# Patient Record
Sex: MALE | Race: BLACK OR AFRICAN AMERICAN | NOT HISPANIC OR LATINO | URBAN - METROPOLITAN AREA
[De-identification: names, ages, dates, MRNs, and addresses within clinical notes are randomized per-mention and may not be internally consistent; named-entity substitution may affect disease eponyms.]

---

## 2017-02-24 PROBLEM — Z00.00 ENCOUNTER FOR PREVENTIVE HEALTH EXAMINATION: Status: ACTIVE | Noted: 2017-02-24

## 2021-06-20 ENCOUNTER — INPATIENT (INPATIENT)
Facility: HOSPITAL | Age: 86
LOS: 9 days | Discharge: LEFT BEFORE DISCHARGE | End: 2021-06-30
Attending: HOSPITALIST | Admitting: HOSPITALIST
Payer: MEDICARE

## 2021-06-20 VITALS
HEART RATE: 118 BPM | SYSTOLIC BLOOD PRESSURE: 187 MMHG | DIASTOLIC BLOOD PRESSURE: 114 MMHG | OXYGEN SATURATION: 95 % | RESPIRATION RATE: 28 BRPM

## 2021-06-20 DIAGNOSIS — N17.9 ACUTE KIDNEY FAILURE, UNSPECIFIED: ICD-10-CM

## 2021-06-20 DIAGNOSIS — I51.9 HEART DISEASE, UNSPECIFIED: ICD-10-CM

## 2021-06-20 DIAGNOSIS — E87.2 ACIDOSIS: ICD-10-CM

## 2021-06-20 DIAGNOSIS — E11.10 TYPE 2 DIABETES MELLITUS WITH KETOACIDOSIS WITHOUT COMA: ICD-10-CM

## 2021-06-20 LAB
ALBUMIN SERPL ELPH-MCNC: 3.8 G/DL — SIGNIFICANT CHANGE UP (ref 3.3–5)
ALP SERPL-CCNC: 116 U/L — SIGNIFICANT CHANGE UP (ref 40–120)
ALT FLD-CCNC: 24 U/L — SIGNIFICANT CHANGE UP (ref 4–41)
ANION GAP SERPL CALC-SCNC: 28 MMOL/L — HIGH (ref 7–14)
ANION GAP SERPL CALC-SCNC: 35 MMOL/L — HIGH (ref 7–14)
ANION GAP SERPL CALC-SCNC: 43 MMOL/L — HIGH (ref 7–14)
APPEARANCE UR: CLEAR — SIGNIFICANT CHANGE UP
APTT BLD: 29.7 SEC — SIGNIFICANT CHANGE UP (ref 27–36.3)
AST SERPL-CCNC: 47 U/L — HIGH (ref 4–40)
B-OH-BUTYR SERPL-SCNC: 0.9 MMOL/L — HIGH (ref 0–0.4)
BACTERIA # UR AUTO: ABNORMAL
BASE EXCESS BLDV CALC-SCNC: -11.5 MMOL/L — LOW (ref -3–2)
BASE EXCESS BLDV CALC-SCNC: -16.5 MMOL/L — LOW (ref -3–2)
BASOPHILS # BLD AUTO: 0.06 K/UL — SIGNIFICANT CHANGE UP (ref 0–0.2)
BASOPHILS NFR BLD AUTO: 0.5 % — SIGNIFICANT CHANGE UP (ref 0–2)
BILIRUB SERPL-MCNC: 0.7 MG/DL — SIGNIFICANT CHANGE UP (ref 0.2–1.2)
BILIRUB UR-MCNC: NEGATIVE — SIGNIFICANT CHANGE UP
BLOOD GAS ARTERIAL - LYTES,HGB,ICA,LACT RESULT: SIGNIFICANT CHANGE UP
BLOOD GAS ARTERIAL COMPREHENSIVE RESULT: SIGNIFICANT CHANGE UP
BLOOD GAS VENOUS - CREATININE: 1.6 MG/DL — HIGH (ref 0.5–1.3)
BLOOD GAS VENOUS - CREATININE: 2.3 MG/DL — HIGH (ref 0.5–1.3)
BLOOD GAS VENOUS COMPREHENSIVE RESULT: SIGNIFICANT CHANGE UP
BLOOD GAS VENOUS COMPREHENSIVE RESULT: SIGNIFICANT CHANGE UP
BUN SERPL-MCNC: 15 MG/DL — SIGNIFICANT CHANGE UP (ref 7–23)
BUN SERPL-MCNC: 20 MG/DL — SIGNIFICANT CHANGE UP (ref 7–23)
BUN SERPL-MCNC: 20 MG/DL — SIGNIFICANT CHANGE UP (ref 7–23)
CALCIUM SERPL-MCNC: 6 MG/DL — CRITICAL LOW (ref 8.4–10.5)
CALCIUM SERPL-MCNC: 7.9 MG/DL — LOW (ref 8.4–10.5)
CALCIUM SERPL-MCNC: 9.6 MG/DL — SIGNIFICANT CHANGE UP (ref 8.4–10.5)
CHLORIDE BLDV-SCNC: 103 MMOL/L — SIGNIFICANT CHANGE UP (ref 96–108)
CHLORIDE BLDV-SCNC: 82 MMOL/L — LOW (ref 96–108)
CHLORIDE SERPL-SCNC: 100 MMOL/L — SIGNIFICANT CHANGE UP (ref 98–107)
CHLORIDE SERPL-SCNC: 71 MMOL/L — LOW (ref 98–107)
CHLORIDE SERPL-SCNC: 96 MMOL/L — LOW (ref 98–107)
CK MB BLD-MCNC: 4.4 % — HIGH (ref 0–2.5)
CK MB CFR SERPL CALC: 18.8 NG/ML — HIGH
CK SERPL-CCNC: 426 U/L — HIGH (ref 30–200)
CO2 SERPL-SCNC: 11 MMOL/L — LOW (ref 22–31)
CO2 SERPL-SCNC: 12 MMOL/L — LOW (ref 22–31)
CO2 SERPL-SCNC: 8 MMOL/L — CRITICAL LOW (ref 22–31)
COLOR SPEC: YELLOW — SIGNIFICANT CHANGE UP
CREAT SERPL-MCNC: 1.58 MG/DL — HIGH (ref 0.5–1.3)
CREAT SERPL-MCNC: 2.08 MG/DL — HIGH (ref 0.5–1.3)
CREAT SERPL-MCNC: 2.09 MG/DL — HIGH (ref 0.5–1.3)
CRP SERPL-MCNC: 28.7 MG/L — HIGH
DIFF PNL FLD: ABNORMAL
EOSINOPHIL # BLD AUTO: 0.03 K/UL — SIGNIFICANT CHANGE UP (ref 0–0.5)
EOSINOPHIL NFR BLD AUTO: 0.3 % — SIGNIFICANT CHANGE UP (ref 0–6)
EPI CELLS # UR: 8 /HPF — HIGH (ref 0–5)
GAS PNL BLDV: 122 MMOL/L — LOW (ref 136–146)
GAS PNL BLDV: 141 MMOL/L — SIGNIFICANT CHANGE UP (ref 136–146)
GLUCOSE BLDC GLUCOMTR-MCNC: 408 MG/DL — HIGH (ref 70–99)
GLUCOSE BLDC GLUCOMTR-MCNC: 426 MG/DL — HIGH (ref 70–99)
GLUCOSE BLDC GLUCOMTR-MCNC: 489 MG/DL — CRITICAL HIGH (ref 70–99)
GLUCOSE BLDC GLUCOMTR-MCNC: 522 MG/DL — CRITICAL HIGH (ref 70–99)
GLUCOSE BLDV-MCNC: 647 MG/DL — CRITICAL HIGH (ref 70–99)
GLUCOSE BLDV-MCNC: >1081 MG/DL — CRITICAL HIGH (ref 70–99)
GLUCOSE SERPL-MCNC: 1329 MG/DL — CRITICAL HIGH (ref 70–99)
GLUCOSE SERPL-MCNC: 584 MG/DL — CRITICAL HIGH (ref 70–99)
GLUCOSE SERPL-MCNC: 633 MG/DL — CRITICAL HIGH (ref 70–99)
GLUCOSE UR QL: ABNORMAL
HCO3 BLDV-SCNC: 11 MMOL/L — LOW (ref 20–27)
HCO3 BLDV-SCNC: 14 MMOL/L — LOW (ref 20–27)
HCT VFR BLD CALC: 43.9 % — SIGNIFICANT CHANGE UP (ref 39–50)
HCT VFR BLDA CALC: 23.5 % — LOW (ref 39–51)
HCT VFR BLDA CALC: 28.3 % — LOW (ref 39–51)
HGB BLD CALC-MCNC: 7.5 G/DL — LOW (ref 13–17)
HGB BLD CALC-MCNC: 9.1 G/DL — LOW (ref 13–17)
HGB BLD-MCNC: 13.4 G/DL — SIGNIFICANT CHANGE UP (ref 13–17)
HYALINE CASTS # UR AUTO: 1 /LPF — SIGNIFICANT CHANGE UP (ref 0–7)
IANC: 8.5 K/UL — SIGNIFICANT CHANGE UP (ref 1.5–8.5)
IMM GRANULOCYTES NFR BLD AUTO: 0.8 % — SIGNIFICANT CHANGE UP (ref 0–1.5)
INR BLD: 1.74 RATIO — HIGH (ref 0.88–1.16)
KETONES UR-MCNC: ABNORMAL
LACTATE BLDV-MCNC: 11.7 MMOL/L — CRITICAL HIGH (ref 0.5–2)
LACTATE BLDV-MCNC: 9.3 MMOL/L — CRITICAL HIGH (ref 0.5–2)
LEUKOCYTE ESTERASE UR-ACNC: NEGATIVE — SIGNIFICANT CHANGE UP
LIDOCAIN IGE QN: 41 U/L — SIGNIFICANT CHANGE UP (ref 7–60)
LYMPHOCYTES # BLD AUTO: 2.37 K/UL — SIGNIFICANT CHANGE UP (ref 1–3.3)
LYMPHOCYTES # BLD AUTO: 20.7 % — SIGNIFICANT CHANGE UP (ref 13–44)
MAGNESIUM SERPL-MCNC: 1.6 MG/DL — SIGNIFICANT CHANGE UP (ref 1.6–2.6)
MAGNESIUM SERPL-MCNC: 1.9 MG/DL — SIGNIFICANT CHANGE UP (ref 1.6–2.6)
MAGNESIUM SERPL-MCNC: 2.4 MG/DL — SIGNIFICANT CHANGE UP (ref 1.6–2.6)
MCHC RBC-ENTMCNC: 29.5 PG — SIGNIFICANT CHANGE UP (ref 27–34)
MCHC RBC-ENTMCNC: 30.5 GM/DL — LOW (ref 32–36)
MCV RBC AUTO: 96.7 FL — SIGNIFICANT CHANGE UP (ref 80–100)
MONOCYTES # BLD AUTO: 0.4 K/UL — SIGNIFICANT CHANGE UP (ref 0–0.9)
MONOCYTES NFR BLD AUTO: 3.5 % — SIGNIFICANT CHANGE UP (ref 2–14)
NEUTROPHILS # BLD AUTO: 8.5 K/UL — HIGH (ref 1.8–7.4)
NEUTROPHILS NFR BLD AUTO: 74.2 % — SIGNIFICANT CHANGE UP (ref 43–77)
NITRITE UR-MCNC: NEGATIVE — SIGNIFICANT CHANGE UP
NRBC # BLD: 0 /100 WBCS — SIGNIFICANT CHANGE UP
NRBC # FLD: 0 K/UL — SIGNIFICANT CHANGE UP
NT-PROBNP SERPL-SCNC: HIGH PG/ML
PCO2 BLDV: 56 MMHG — HIGH (ref 41–51)
PCO2 BLDV: 67 MMHG — HIGH (ref 41–51)
PH BLDV: 6.99 — CRITICAL LOW (ref 7.32–7.43)
PH BLDV: 7.04 — CRITICAL LOW (ref 7.32–7.43)
PH UR: 6.5 — SIGNIFICANT CHANGE UP (ref 5–8)
PHOSPHATE SERPL-MCNC: 4.8 MG/DL — HIGH (ref 2.5–4.5)
PHOSPHATE SERPL-MCNC: 4.9 MG/DL — HIGH (ref 2.5–4.5)
PHOSPHATE SERPL-MCNC: 6.8 MG/DL — HIGH (ref 2.5–4.5)
PLATELET # BLD AUTO: 142 K/UL — LOW (ref 150–400)
PO2 BLDV: 54 MMHG — HIGH (ref 35–40)
PO2 BLDV: 55 MMHG — HIGH (ref 35–40)
POTASSIUM BLDV-SCNC: 2.3 MMOL/L — CRITICAL LOW (ref 3.4–4.5)
POTASSIUM BLDV-SCNC: 3.8 MMOL/L — SIGNIFICANT CHANGE UP (ref 3.4–4.5)
POTASSIUM SERPL-MCNC: 2.3 MMOL/L — CRITICAL LOW (ref 3.5–5.3)
POTASSIUM SERPL-MCNC: 2.8 MMOL/L — CRITICAL LOW (ref 3.5–5.3)
POTASSIUM SERPL-MCNC: 3.5 MMOL/L — SIGNIFICANT CHANGE UP (ref 3.5–5.3)
POTASSIUM SERPL-SCNC: 2.3 MMOL/L — CRITICAL LOW (ref 3.5–5.3)
POTASSIUM SERPL-SCNC: 2.8 MMOL/L — CRITICAL LOW (ref 3.5–5.3)
POTASSIUM SERPL-SCNC: 3.5 MMOL/L — SIGNIFICANT CHANGE UP (ref 3.5–5.3)
PROT SERPL-MCNC: 7.3 G/DL — SIGNIFICANT CHANGE UP (ref 6–8.3)
PROT UR-MCNC: ABNORMAL
PROTHROM AB SERPL-ACNC: 19.3 SEC — HIGH (ref 10.6–13.6)
RBC # BLD: 4.54 M/UL — SIGNIFICANT CHANGE UP (ref 4.2–5.8)
RBC # FLD: 13.3 % — SIGNIFICANT CHANGE UP (ref 10.3–14.5)
RBC CASTS # UR COMP ASSIST: 52 /HPF — HIGH (ref 0–4)
SAO2 % BLDV: 68.4 % — SIGNIFICANT CHANGE UP (ref 60–85)
SAO2 % BLDV: 70.2 % — SIGNIFICANT CHANGE UP (ref 60–85)
SARS-COV-2 RNA SPEC QL NAA+PROBE: SIGNIFICANT CHANGE UP
SODIUM SERPL-SCNC: 125 MMOL/L — LOW (ref 135–145)
SODIUM SERPL-SCNC: 139 MMOL/L — SIGNIFICANT CHANGE UP (ref 135–145)
SODIUM SERPL-SCNC: 140 MMOL/L — SIGNIFICANT CHANGE UP (ref 135–145)
SP GR SPEC: 1.03 — HIGH (ref 1.01–1.02)
TROPONIN T, HIGH SENSITIVITY RESULT: 283 NG/L — CRITICAL HIGH
TROPONIN T, HIGH SENSITIVITY RESULT: 740 NG/L — CRITICAL HIGH
UROBILINOGEN FLD QL: SIGNIFICANT CHANGE UP
WBC # BLD: 11.45 K/UL — HIGH (ref 3.8–10.5)
WBC # FLD AUTO: 11.45 K/UL — HIGH (ref 3.8–10.5)
WBC UR QL: 9 /HPF — HIGH (ref 0–5)

## 2021-06-20 PROCEDURE — 71045 X-RAY EXAM CHEST 1 VIEW: CPT | Mod: 26,77

## 2021-06-20 PROCEDURE — 93010 ELECTROCARDIOGRAM REPORT: CPT

## 2021-06-20 PROCEDURE — 71045 X-RAY EXAM CHEST 1 VIEW: CPT | Mod: 26,59

## 2021-06-20 PROCEDURE — 99291 CRITICAL CARE FIRST HOUR: CPT | Mod: 25

## 2021-06-20 RX ORDER — CHLORHEXIDINE GLUCONATE 213 G/1000ML
15 SOLUTION TOPICAL EVERY 12 HOURS
Refills: 0 | Status: DISCONTINUED | OUTPATIENT
Start: 2021-06-20 | End: 2021-06-25

## 2021-06-20 RX ORDER — HEPARIN SODIUM 5000 [USP'U]/ML
INJECTION INTRAVENOUS; SUBCUTANEOUS
Qty: 25000 | Refills: 0 | Status: DISCONTINUED | OUTPATIENT
Start: 2021-06-20 | End: 2021-06-22

## 2021-06-20 RX ORDER — SODIUM CHLORIDE 9 MG/ML
2000 INJECTION INTRAMUSCULAR; INTRAVENOUS; SUBCUTANEOUS ONCE
Refills: 0 | Status: COMPLETED | OUTPATIENT
Start: 2021-06-20 | End: 2021-06-20

## 2021-06-20 RX ORDER — CLOPIDOGREL BISULFATE 75 MG/1
75 TABLET, FILM COATED ORAL DAILY
Refills: 0 | Status: DISCONTINUED | OUTPATIENT
Start: 2021-06-20 | End: 2021-06-30

## 2021-06-20 RX ORDER — VANCOMYCIN HCL 1 G
1000 VIAL (EA) INTRAVENOUS ONCE
Refills: 0 | Status: COMPLETED | OUTPATIENT
Start: 2021-06-20 | End: 2021-06-20

## 2021-06-20 RX ORDER — BUMETANIDE 0.25 MG/ML
2 INJECTION INTRAMUSCULAR; INTRAVENOUS
Qty: 20 | Refills: 0 | Status: DISCONTINUED | OUTPATIENT
Start: 2021-06-20 | End: 2021-06-22

## 2021-06-20 RX ORDER — SODIUM BICARBONATE 1 MEQ/ML
0.27 SYRINGE (ML) INTRAVENOUS
Qty: 150 | Refills: 0 | Status: DISCONTINUED | OUTPATIENT
Start: 2021-06-20 | End: 2021-06-20

## 2021-06-20 RX ORDER — ETOMIDATE 2 MG/ML
20 INJECTION INTRAVENOUS ONCE
Refills: 0 | Status: COMPLETED | OUTPATIENT
Start: 2021-06-20 | End: 2021-06-20

## 2021-06-20 RX ORDER — SODIUM BICARBONATE 1 MEQ/ML
50 SYRINGE (ML) INTRAVENOUS ONCE
Refills: 0 | Status: COMPLETED | OUTPATIENT
Start: 2021-06-20 | End: 2021-06-20

## 2021-06-20 RX ORDER — INSULIN HUMAN 100 [IU]/ML
4 INJECTION, SOLUTION SUBCUTANEOUS
Qty: 100 | Refills: 0 | Status: DISCONTINUED | OUTPATIENT
Start: 2021-06-20 | End: 2021-06-21

## 2021-06-20 RX ORDER — POTASSIUM CHLORIDE 20 MEQ
40 PACKET (EA) ORAL ONCE
Refills: 0 | Status: COMPLETED | OUTPATIENT
Start: 2021-06-20 | End: 2021-06-20

## 2021-06-20 RX ORDER — POTASSIUM CHLORIDE 20 MEQ
10 PACKET (EA) ORAL
Refills: 0 | Status: COMPLETED | OUTPATIENT
Start: 2021-06-20 | End: 2021-06-20

## 2021-06-20 RX ORDER — SODIUM BICARBONATE 1 MEQ/ML
50 SYRINGE (ML) INTRAVENOUS ONCE
Refills: 0 | Status: DISCONTINUED | OUTPATIENT
Start: 2021-06-20 | End: 2021-06-20

## 2021-06-20 RX ORDER — ASPIRIN/CALCIUM CARB/MAGNESIUM 324 MG
81 TABLET ORAL DAILY
Refills: 0 | Status: DISCONTINUED | OUTPATIENT
Start: 2021-06-20 | End: 2021-06-30

## 2021-06-20 RX ORDER — ROCURONIUM BROMIDE 10 MG/ML
60 VIAL (ML) INTRAVENOUS ONCE
Refills: 0 | Status: COMPLETED | OUTPATIENT
Start: 2021-06-20 | End: 2021-06-20

## 2021-06-20 RX ORDER — MAGNESIUM SULFATE 500 MG/ML
2 VIAL (ML) INJECTION ONCE
Refills: 0 | Status: COMPLETED | OUTPATIENT
Start: 2021-06-20 | End: 2021-06-20

## 2021-06-20 RX ORDER — HEPARIN SODIUM 5000 [USP'U]/ML
3500 INJECTION INTRAVENOUS; SUBCUTANEOUS ONCE
Refills: 0 | Status: COMPLETED | OUTPATIENT
Start: 2021-06-20 | End: 2021-06-20

## 2021-06-20 RX ORDER — POTASSIUM CHLORIDE 20 MEQ
10 PACKET (EA) ORAL ONCE
Refills: 0 | Status: COMPLETED | OUTPATIENT
Start: 2021-06-20 | End: 2021-06-20

## 2021-06-20 RX ORDER — PROPOFOL 10 MG/ML
10 INJECTION, EMULSION INTRAVENOUS
Qty: 1000 | Refills: 0 | Status: DISCONTINUED | OUTPATIENT
Start: 2021-06-20 | End: 2021-06-22

## 2021-06-20 RX ORDER — HYDRALAZINE HCL 50 MG
10 TABLET ORAL ONCE
Refills: 0 | Status: COMPLETED | OUTPATIENT
Start: 2021-06-20 | End: 2021-06-20

## 2021-06-20 RX ORDER — BUMETANIDE 0.25 MG/ML
4 INJECTION INTRAMUSCULAR; INTRAVENOUS ONCE
Refills: 0 | Status: COMPLETED | OUTPATIENT
Start: 2021-06-20 | End: 2021-06-20

## 2021-06-20 RX ORDER — PIPERACILLIN AND TAZOBACTAM 4; .5 G/20ML; G/20ML
3.38 INJECTION, POWDER, LYOPHILIZED, FOR SOLUTION INTRAVENOUS ONCE
Refills: 0 | Status: COMPLETED | OUTPATIENT
Start: 2021-06-20 | End: 2021-06-20

## 2021-06-20 RX ADMIN — HEPARIN SODIUM 700 UNIT(S)/HR: 5000 INJECTION INTRAVENOUS; SUBCUTANEOUS at 23:00

## 2021-06-20 RX ADMIN — Medication 250 MILLIGRAM(S): at 18:27

## 2021-06-20 RX ADMIN — BUMETANIDE 132 MILLIGRAM(S): 0.25 INJECTION INTRAMUSCULAR; INTRAVENOUS at 22:25

## 2021-06-20 RX ADMIN — INSULIN HUMAN 6 UNIT(S)/HR: 100 INJECTION, SOLUTION SUBCUTANEOUS at 16:32

## 2021-06-20 RX ADMIN — Medication 100 MILLIEQUIVALENT(S): at 20:07

## 2021-06-20 RX ADMIN — PROPOFOL 3.4 MICROGRAM(S)/KG/MIN: 10 INJECTION, EMULSION INTRAVENOUS at 18:28

## 2021-06-20 RX ADMIN — Medication 50 MILLIEQUIVALENT(S): at 15:55

## 2021-06-20 RX ADMIN — Medication 0.27 MEQ/KG/HR: at 17:45

## 2021-06-20 RX ADMIN — Medication 100 MILLIEQUIVALENT(S): at 21:54

## 2021-06-20 RX ADMIN — ETOMIDATE 20 MILLIGRAM(S): 2 INJECTION INTRAVENOUS at 16:55

## 2021-06-20 RX ADMIN — SODIUM CHLORIDE 2000 MILLILITER(S): 9 INJECTION INTRAMUSCULAR; INTRAVENOUS; SUBCUTANEOUS at 15:20

## 2021-06-20 RX ADMIN — INSULIN HUMAN 7 UNIT(S)/HR: 100 INJECTION, SOLUTION SUBCUTANEOUS at 19:16

## 2021-06-20 RX ADMIN — Medication 100 MILLIEQUIVALENT(S): at 16:27

## 2021-06-20 RX ADMIN — INSULIN HUMAN 4 UNIT(S)/HR: 100 INJECTION, SOLUTION SUBCUTANEOUS at 21:54

## 2021-06-20 RX ADMIN — Medication 50 GRAM(S): at 22:25

## 2021-06-20 RX ADMIN — BUMETANIDE 20 MG/HR: 0.25 INJECTION INTRAMUSCULAR; INTRAVENOUS at 22:25

## 2021-06-20 RX ADMIN — PIPERACILLIN AND TAZOBACTAM 200 GRAM(S): 4; .5 INJECTION, POWDER, LYOPHILIZED, FOR SOLUTION INTRAVENOUS at 17:50

## 2021-06-20 RX ADMIN — Medication 125 MEQ/KG/HR: at 16:10

## 2021-06-20 RX ADMIN — HEPARIN SODIUM 3500 UNIT(S): 5000 INJECTION INTRAVENOUS; SUBCUTANEOUS at 23:16

## 2021-06-20 RX ADMIN — Medication 60 MILLIGRAM(S): at 16:55

## 2021-06-20 RX ADMIN — Medication 10 MILLIGRAM(S): at 15:30

## 2021-06-20 RX ADMIN — Medication 40 MILLIEQUIVALENT(S): at 22:45

## 2021-06-20 NOTE — CONSULT NOTE ADULT - PROBLEM SELECTOR RECOMMENDATION 2
Pt with anion gap metabolic and lactic acidosis in the setting of likely DKA with LINDSEY. On admission, Serum CCO2 low at 8 with lactate 11.7 and pH: 7.04. Pt received IV sodium bicarbonate in  the ED. Repeat ABG with pH improved to 7.27, Serum CO2: 12 and lactate 12.4. Pt being managed for DKA by primary team. Can consider giving IV sodium bicarbonate with acidemia does not improve. Would continue to monitor pH and serum CO2 levels for now.    Will discuss with Nephrology Attending On call Dr. Law  If you have any questions, please feel free to contact me  Kari Tristan  Nephrology Fellow  Pager: [phone] / [facility]: [phone]  (After 5pm or on weekends please page the on-call fellow) Pt with anion gap metabolic and lactic acidosis in the setting of likely DKA with LINDSEY. On admission, Serum CCO2 low at 8 with lactate 11.7 and pH: 7.04. Pt received IV sodium bicarbonate in  the ED. Repeat ABG with pH improved to 7.27, Serum CO2: 12 and lactate 12.4. Pt being managed for DKA by primary team. Can consider giving IV sodium bicarbonate with acidemia does not improve. Would continue to monitor pH and serum CO2 levels for now.    Discussed with Nephrology Attending On call Dr. Law  If you have any questions, please feel free to contact me  Kari Tristan  Nephrology Fellow  Pager: [phone] / [facility]: [phone]  (After 5pm or on weekends please page the on-call fellow)

## 2021-06-20 NOTE — H&P ADULT - NSHPLABSRESULTS_GEN_ALL_CORE
VITAL SIGNS:  ICU Vital Signs Last 24 Hrs  T(C): --  T(F): --  HR: 124 (2021 16:00) (118 - 132)  BP: 184/127 (2021 16:00) (163/112 - 198/140)  BP(mean): --  ABP: --  ABP(mean): --  RR: 35 (2021 16:00) (28 - 36)  SpO2: 100% (2021 16:00) (90% - 100%)      Plateau pressure:   P/F ratio:     CAPILLARY BLOOD GLUCOSE      POCT Blood Glucose.: 511 mg/dL (2021 17:34)    ECG:    MEDICATIONS:  MEDICATIONS  (STANDING):  insulin regular Infusion 6 Unit(s)/Hr (6 mL/Hr) IV Continuous <Continuous>  piperacillin/tazobactam IVPB... 3.375 Gram(s) IV Intermittent once  vancomycin  IVPB 1000 milliGRAM(s) IV Intermittent once    MEDICATIONS  (PRN):      ALLERGIES:  Allergies    dyes,iv dyes (Unknown)  iodinated radiocontrast agents (Hives)    Intolerances        LABS:                        13.4   11.45 )-----------( 142      ( 2021 15:13 )             43.9     06-20    139  |  96<L>  |  20  ----------------------------<  633<HH>  3.5   |  8<LL>  |  2.09<H>    Ca    9.6      2021 15:13  Phos  6.8     06-20  Mg     2.4     06-20    TPro  7.3  /  Alb  3.8  /  TBili  0.7  /  DBili  x   /  AST  47<H>  /  ALT  24  /  AlkPhos  116  06-20      Urinalysis Basic - ( 2021 16:48 )    Color: Yellow / Appearance: Clear / S.032 / pH: x  Gluc: x / Ketone: Trace  / Bili: Negative / Urobili: <2 mg/dL   Blood: x / Protein: >600 mg/dL / Nitrite: Negative   Leuk Esterase: Negative / RBC: 52 /HPF / WBC 9 /HPF   Sq Epi: x / Non Sq Epi: 8 /HPF / Bacteria: Few        RADIOLOGY & ADDITIONAL TESTS: Reviewed. VITAL SIGNS:  ICU Vital Signs Last 24 Hrs  T(C): --  T(F): --  HR: 124 (2021 16:00) (118 - 132)  BP: 184/127 (2021 16:00) (163/112 - 198/140)  BP(mean): --  ABP: --  ABP(mean): --  RR: 35 (2021 16:00) (28 - 36)  SpO2: 100% (2021 16:00) (90% - 100%)      Plateau pressure:   P/F ratio:     CAPILLARY BLOOD GLUCOSE      POCT Blood Glucose.: 511 mg/dL (2021 17:34)    ECG:    MEDICATIONS:  MEDICATIONS  (STANDING):  insulin regular Infusion 6 Unit(s)/Hr (6 mL/Hr) IV Continuous <Continuous>  piperacillin/tazobactam IVPB... 3.375 Gram(s) IV Intermittent once  vancomycin  IVPB 1000 milliGRAM(s) IV Intermittent once    MEDICATIONS  (PRN):      ALLERGIES:  Allergies    dyes,iv dyes (Unknown)  iodinated radiocontrast agents (Hives)    Intolerances        LABS:                        13.4   11.45 )-----------( 142      ( 2021 15:13 )             43.9     06-20    139  |  96<L>  |  20  ----------------------------<  633<HH>  3.5   |  8<LL>  |  2.09<H>    Ca    9.6      2021 15:13  Phos  6.8     06-20  Mg     2.4     06-20    TPro  7.3  /  Alb  3.8  /  TBili  0.7  /  DBili  x   /  AST  47<H>  /  ALT  24  /  AlkPhos  116  06-20      Urinalysis Basic - ( 2021 16:48 )    Color: Yellow / Appearance: Clear / S.032 / pH: x  Gluc: x / Ketone: Trace  / Bili: Negative / Urobili: <2 mg/dL   Blood: x / Protein: >600 mg/dL / Nitrite: Negative   Leuk Esterase: Negative / RBC: 52 /HPF / WBC 9 /HPF   Sq Epi: x / Non Sq Epi: 8 /HPF / Bacteria: Few        RADIOLOGY & ADDITIONAL TESTS: Reviewed.    CXR:   IMPRESSION: Bilateral layering pleural effusions may be due to fluid overload CHF less likely.

## 2021-06-20 NOTE — ED PROVIDER NOTE - ADMIT DISPOSITION PRESENT ON ADMISSION SEPSIS
Patient reported that the abscess of the right breast has worsened. He would like to get the area lanced. He also is requesting an antibiotic until he is able to see the specialist.  Keflex ordered. Yes

## 2021-06-20 NOTE — ED PROVIDER NOTE - PROGRESS NOTE DETAILS
FRANKLIN:  Patient with worsening mental status on bipap.  Not following commands.  Mixed respiratory and metabolic acidosis.  Patient not compensating for likely dka.  Will plan for intubation as patient is not likely to reverse hypercarbia on nippv.  Will discuss intubation with son. NOEMI:  Son contented to intubation.  Right tibial IO placed for more access.  Patient was pre-oxygenated with BIPAP.  Intubated on first attempted by Dr. Louise by glidescope after receiving etomidate and rocuronium, lowest 02 sat 99%.  Bilateral breath sounds auscultated, tube confirmed by et c02 waveform capnography.  CXR pending.  Patient to received insulin, ivf, abx.  MICU aware of patient. Sandra Louise MD PGY-3 Lactate 12.4. Spoke with MICU about possibility of CT abd/pelvis for intraabd ischemia. Would prefer to bring patient to the ICU at this time given instability and patient not currently stable for surgery, CT would not necessarily  at this time Sandra Louise MD PGY-3 patient with worsening glucose and acidosis despite resuscitation, vent settings modified to increase RR to 28, PEEP decreased given sp02 100%., concern for possible inaccurate labs. will re-draw, MICU at bedside. Troponin worsening in the setting of pulm edema/pleural effusions, concern for possible primary cardiac etiology for DKA. MICU aware. NOEMI:  Son contented to intubation.  Right tibial IO placed for more access.  Patient was pre-oxygenated with BIPAP.  Intubated on first attempted by Dr. Louise by glidescope after receiving etomidate and rocuronium, lowest 02 sat 99%.  Bilateral breath sounds auscultated, tube confirmed by et c02 waveform capnography.  CXR pending.  Patient to received insulin, ivf, abx.  Plan to place CVL for additional access. MICU aware of patient. NOEMI:  Patient brought back to trauma bay.  Per EMS, 84 yo M pmh IDDM type I, htn, on augmentin for RLE leg ulcer presents for shortness of breath, lethargy, altered mental status.  FSG "high" in field.  On arrival patient lethargic but following simple commands, deep and fast respirations (Kussmaul-like), protecting airway, crackles at lung bases, RLE ulcer with clean base, cool lower extremities, mm dry.  Hypertensive, tachy (sinus rhythm with PAC on monitor).  EKG non-stemi.  FSG >600.  Suspected DKA with hypertensive heart failure (hypertensive emergency), possible infectious source as well (cellulitis, pna, uti considered).  PIV x2 obtained.  Patient given crystalloid challenge given suspected dka.  Limited IV access at this time so nitro gtt withheld and hydralazine trialed for afterload reduction.  Respiratory called to initiate NIPPV given patient following commands and requiring respiratory support.  Anticipate MICU admission. FRANKLIN:  Patient with worsening mental status on bipap.  Not following commands.  Mixed respiratory and metabolic acidosis, ph 7.0.  Patient not compensating for likely dka.  Bicarb ordered given severe acidemia.  Will plan for intubation as patient is not likely to reverse hypercarbia on nippv.  Will discuss intubation with son.

## 2021-06-20 NOTE — ED PROVIDER NOTE - CLINICAL SUMMARY MEDICAL DECISION MAKING FREE TEXT BOX
86 yo M with PMH type 1 DM, HTN who presents with decreased mental status and increased WOB. Rest of history and physical as noted. Concern for DKA, possibly 2/2 infectious etiology vs primary cardiac etiology vs ?intraabd ischemia given lactate. Will get labs, xray, insulin gtt prn/K supplementation prn,, bicarb prn, trop, UA, respiratory support and ICU

## 2021-06-20 NOTE — H&P ADULT - NSHPPHYSICALEXAM_GEN_ALL_CORE
PHYSICAL EXAM:  Vital Signs Last 24 Hrs  T(C): --  T(F): --  HR: 124 (06-20-21 @ 16:00) (118 - 132)  BP: 184/127 (06-20-21 @ 16:00)  BP(mean): --  RR: 35 (06-20-21 @ 16:00) (28 - 36)  SpO2: 100% (06-20-21 @ 16:00) (90% - 100%)  Wt(kg): --    Constitutional: NAD, awake and alert  EYES: EOMI  ENT:  Normal Hearing, no tonsillar exudates   Neck: Soft and supple , no thyromegaly   Respiratory: Breath sounds are clear bilaterally, No wheezing, rales or rhonchi  Cardiovascular: S1 and S2, regular rate and rhythm, no Murmurs, gallops or rubs, no JVD,    Gastrointestinal: Bowel Sounds present, soft, nontender, nondistended, no guarding, no rebound  Extremities: No cyanosis or clubbing; warm to touch  Vascular: 2+ peripheral pulses lower ex  Neurological: No focal deficits, CN II-XII intact bilaterally, sensation to light touch intact in all extremities, gait intact. Pupils are equally reactive to light and symmetrical in size.   Musculoskeletal: 5/5 strength b/l upper and lower extremities; no joint swelling.  Skin: No rashes  Psych: no depression or anhedonia, AAOx3  HEME: no bruises, no nose bleeds

## 2021-06-20 NOTE — H&P ADULT - ASSESSMENT
85 year old male with hx of diabetes who comes in for AMS, found to be hypoxic and in DKA with additional acute renal failure, intubated and sedated in the ED due to worsening mental status and inability to protect airway.       #Neuro   -currently intubated and sedated        #Pulm   -intubated   -CXR reviewed with small bilateral pleural effusions   -metabolic acidosis with concomitant respiratory acidosis (corrected PCO2 should be 18-22 mmHg)  -Ventilator settings:     #Cardiac:   -hypertensive emergency possibly with acute renal failure   -control BP       #GI;   -NPO b/c intubated  -will need NGT and likely start tube feeds   -monitor stool count     #Renal:   -BUN:Cr < 10   -component of acute renal failure likely 2/2 ATN maybe 2/2 hypertensive emergency   -abbott catheter to monitor I/O, avoid nephrotoxins   -HAGMA 2/2 lactic acidosis and component of DKA   -delta-delta gap 1.44; no concurrent non anion gap acidosis or metabolic alkalosis   -s/p Bicarb push and gtt in ED, will d/c bicarb gtt   -fluid resuscitation for lactic acid clearance   -will need central line access for possible HD if worsening acidosis   -repeat ABGs     #ID:   -leukocytosis to 11, tachycardic and tachypneic, source of infection ?   -BCx pending (6/20), started empirically on vancomycin/zosyn (6/20/21), will consider taking one off due to renal function   -MRSA swab, COVID pending     #Heme/Onc:   -DVT ppx with subq heparin   -monitor leukocytosis   -no thrombocytopenia or anemia  85 year old male with hx of diabetes who comes in for AMS, found to be hypoxic and in DKA with additional acute renal failure, intubated and sedated in the ED due to worsening mental status and inability to protect airway.       #Neuro   -currently intubated and sedated        #Pulm   -intubated   -CXR reviewed with small bilateral pleural effusions   -metabolic acidosis with concomitant respiratory acidosis (corrected PCO2 should be 18-22 mmHg)  -Ventilator settings:     #Cardiac:   -hypertensive emergency possibly with acute renal failure   -control BP       #GI;   -NPO b/c intubated  -will need NGT and likely start tube feeds   -monitor stool count     #Renal:   -BUN:Cr < 10   -component of acute renal failure likely 2/2 ATN maybe 2/2 hypertensive emergency   -abbott catheter to monitor I/O, avoid nephrotoxins   -HAGMA 2/2 lactic acidosis and component of DKA   -delta-delta gap 1.44; no concurrent non anion gap acidosis or metabolic alkalosis   -s/p Bicarb push and gtt in ED, will d/c bicarb gtt   -fluid resuscitation for lactic acid clearance   -will need central line access for possible HD if worsening acidosis   -repeat ABGs   -renal consult    #ID:   -leukocytosis to 11, tachycardic and tachypneic, source of infection ?   -BCx pending (6/20), started empirically on vancomycin/zosyn (6/20/21), will consider taking one off due to renal function   -MRSA swab, COVID pending     #Heme/Onc:   -DVT ppx with subq heparin   -monitor leukocytosis   -no thrombocytopenia or anemia  85 year old male with hx of diabetes who comes in for AMS, found to be hypoxic and in DKA with additional acute renal failure, intubated and sedated in the ED due to worsening mental status and inability to protect airway.       #Neuro   -currently intubated and sedated   - no active mental status with low dose propofol --> concerning, will continue to follow       #Pulm   -intubated   -CXR reviewed with bilateral loculated pleural effusions --> confirmed on POCUS  -metabolic acidosis with concomitant respiratory acidosis (corrected PCO2 should be 18-22 mmHg)  -Ventilator settings: 450/28/5/60    #Cardiac:   - Severely decreased LV function on POCUS with increasing troponins, STD seen in lateral leads --> unclear if this is patient's baseline or not, no EKG to compare. Will c/s cardiology.   -hypertensive emergency possibly with acute renal failure   -control BP       #GI;   -NPO b/c intubated  -will need NGT and likely start tube feeds   -monitor stool count     #Renal:   -BUN:Cr < 10   -component of acute renal failure likely 2/2 ATN maybe 2/2 hypertensive emergency   -abbott catheter to monitor I/O, avoid nephrotoxins   -HAGMA 2/2 lactic acidosis and component of DKA   -delta-delta gap 1.44; no concurrent non anion gap acidosis or metabolic alkalosis   -s/p Bicarb push and gtt in ED, will d/c bicarb gtt   -fluid resuscitation for lactic acid clearance   -will need central line access for possible HD if worsening acidosis   -repeat ABGs   -renal consult    #ID:   -leukocytosis to 11, tachycardic and tachypneic, source of infection ?   -BCx pending (6/20), started empirically on vancomycin/zosyn (6/20/21), will consider taking one off due to renal function   -MRSA swab, COVID pending     #Heme/Onc:   -DVT ppx with subq heparin   -monitor leukocytosis   -no thrombocytopenia or anemia

## 2021-06-20 NOTE — CONSULT NOTE ADULT - PROBLEM SELECTOR RECOMMENDATION 9
Pt with hemodynamically mediated LINDSEY in the setting of infection?, DKA and medication use (Telmisartan). No previous labs for review. On admission, SCr elevated at 2.09 (6/20). UA dirty with proteinuria, hematuria, pyuria. POCUS exam by ICU team showed evidence suggestive of chronic kidney disease.  Send urine electrolytes and spot urine TP/CR. Send for serum and urine immunofixation. Obtain Kidney sonogram. Pt appears hypervolemic on examination. CXR findings reviewed. Recommend give Bumex 4 mg once and start on  Bumex IV infusion at 1mg/hour. Will need to consider CVVHDF if renal failure continues to worsen. Hold Telmisartan. Monitor labs and urine output. Avoid NSAIDs, ACEI/ARBS, RCA and nephrotoxins. Dose medications as per eGFR. Pt with hemodynamically mediated LINDSEY in the setting of infection?, DKA and medication use (Telmisartan). No previous labs for review. On admission, SCr elevated at 2.09 (6/20). UA dirty with proteinuria, hematuria, pyuria. Send urine electrolytes and spot urine TP/CR. Send for serum and urine immunofixation. Obtain Kidney sonogram. Pt appears hypervolemic on examination. CXR findings reviewed. Recommend give Bumex 4 mg once and start on  Bumex IV infusion at 1mg/hour. Will need to consider CVVHDF if renal failure continues to worsen. Hold Telmisartan. Monitor labs and urine output. Avoid NSAIDs, ACEI/ARBS, RCA and nephrotoxins. Dose medications as per eGFR.

## 2021-06-20 NOTE — ED ADULT NURSE REASSESSMENT NOTE - NS ED NURSE REASSESS COMMENT FT1
report received from LOKI Abreu pt intubated admitted to MICU for DKA. mobile ICU LOKI Nicholson at bedside, enroute to MICU with patient.

## 2021-06-20 NOTE — CONSULT NOTE ADULT - PROBLEM SELECTOR RECOMMENDATION 9
POCUS reviewed by cardiology fellow and discussed with MICU attending. The severe LVSD unlikely acute as pt is hemodynamically stable and not on pressor. EKG is without acute ischemia. POCUS reviewed by cardiology fellow and discussed with MICU attending. The severe LVSD unlikely acute as pt is hemodynamically stable and not on pressor. EKG is without acute ischemia. Troponin is elevated in the setting of hypoxia, worsening renal function and ADHF with pbnp >49,000. More likely chronic component to clinical picture. Unlikely to benefit from mechanical support at this time. Will reevaluate as necessary.    Trend cardiac enzymes  risk factor profile to include TSH, HGBA1c, Lipid profile,   EKG daily and PRN chest pain  Diurese, strict I/O, daily weights  Trend BMP 2/2 diuresis and replete as needed, k>4, mg> 2  hold BB at this time 2/2  ADHF.  Echo to evaluate LV/RV, valves and WMA

## 2021-06-20 NOTE — ED PROCEDURE NOTE - CPROC ED INFORMED CONSENT1
verbal consent by son at bedside/This was an emergent procedure and consent was implied.
This was an emergent procedure and consent was implied.

## 2021-06-20 NOTE — ED PROVIDER NOTE - OBJECTIVE STATEMENT
86 yo M with PMH type 1 DM, HTN who presents with decreased mental status and increased WOB. BIBEMS with son at bedside. FS red HIGH in field and in ER. Has been taking augmentin x 3-4 days for concern for b/l LE infection. Patient unstable with urgent need for intervention, patient unable to provide history at this time.     Meds: Telmisartan, isosorbide, folbee, atenolol, colesevelam, augmentin  Unclear diabetic regimen at this time

## 2021-06-20 NOTE — H&P ADULT - ATTENDING COMMENTS
85M with no known PMH presents to the hospital today with 1 week of diarrhea, RLE pain, and confusion by EMS today. Per son, patient began having difficulty walking 2/2 RLE pain which appeared to be ulcerated from son's standpoint. He went to physician who prescribed augmentin. After taking augmentin, patient developed diarrhea multiple times a day for 3-4d which became associated with confusion such that the patient lost sense of time and place. Per son, patient never stopped urinating. Per son, the patient has no cardiac or renal history and see's no physicians for chronic medical problems.      In ED: T: 94.7 BP: 141/74  HR: 130  RR: 35  SpO2: 94% 264SrT4 on Bilevel NIV. Patient provided Vanc/Zosyn, 2L IVF, started on insulin gtt, 10mg hydralazine. Mental status waxing and waning, patient was subsequently intubated for hypercarbic respiratory failure and airway protection. Laboratory investigation revealed WBC: 11.45, mild thrombocytopenia to 142, FS > 600, HAGMA, RA, Metabolic alkalosis, bicarbonate: 8, A, SCr: 2.09 (no known baseline), phos: 6.8, troponin 283-->740, and lactate 11.7-->12.3. Patient started on propofol as well as bumex gtt and brought to MICU for further care.       Neuro:  - reportedly AAOx3 at baseline with subacute decline in the context of diarrhea and poor po intake.  - currently sedated on propofol, reactive pupils, no mental status that can be assessed on low dose, will continue to monitor  - Will consider CTh or EEG in the future    Pulm  - Currently intubated on mechanical ventilation for hypercarbic respiratory failure and airway protection in the context of renal failure and high metabolic demand  - Recent ABG demonstrates pH: 7.27/31/191 on 450/28/10/100%. Will wean vent settings when able  - POCUS demonstrates anterior A-line predominant pattern with lateral B-lines and posterior large heterogeneous pleural effusions bilaterally. ?chronic effusions with chronic diuresis.  - Will check serial ABG's    Cards:   - Not currently in shock state  - POCUS demonstrates severe LV systolic function with diastolic function and elevated LAP (E/E": > 16) with RVSP estimated at 40-45mmHg in the context of metabolic demand and increasingly elevated troponins  - Will check CK, CKMB, and pro-BNP  - EKG demonstrates sinus rhythym with LA enlargement and non-specific T-wave changes. No baseline to compare to  - Unclear if this represents NSTEMI vs type 2 MI. Consulted cardiology. Empirically will start patient on heparin gtt and will start ASA/plavix.  - MAP goal > 65mmHg    Renal  - Likely LINDSEY on CKD (patient takes sevelemer at home) in the context of diarrhea/poor po intake and etiology could be pre-renal vs medical renal disease  - patient reportedly not on Metformin at home  - POCUS demonstrates abnormal cortex echogenicity  - c/w phosphate binders and start patient on bicarb gtt at 150cc/hr for now.  - Patient currently anuric and is fluid overloaded. No electrolyte abnormalities. Consulted renal and will attempt loop diuretic challenge prior to HD to UF and refractory acidosis purposes.   - BMP q4h with mg/phos for now  - Rising lactate likely in the setting of infection vs TypeA/B etiologies    GI  - Will establish OGT access  - Can begin OGT feeds with nepro when appropriate from DKA standpoint  - GI p/px can be provided either with feeds or with PPI    Endocrine  - Currently patient meeds criteria for DKA; however, BHB low (0.9) raising suspicion that acidosis is driven more by renal failure than by DKA; however, will place on insulin gtt empirically for hyperglycemia control  - DKA protocol for now with BMP q4h with mg/phos for now  - will hold TF until AG closed and then will start NPH    Heme  - No hematologic excurions at this time    ID  - Patient currently hypothermic  - Will start empiric abx at this time, renally dose vancomycin, azithromycin, and meropenam send BCx, SCx, UA, Ucx, Ulegionella.   - Will de-escalate as appropriate    Skin  - Has 1 PIV, will place subclavian TLC for enhanced IV access  - Has what appears to be saphenous vein graft harvesting surgery on left leg --> unclear what for  - chronic PAD ulcer on RLE        Patient is Full Code  Patient critically ill, guarded prognosis.

## 2021-06-20 NOTE — ED PROVIDER NOTE - ATTENDING CONTRIBUTION TO CARE
I have personally performed a face to face bedside history and physical examination of this patient. I have discussed the history, examination, review of systems, assessment and plan of management with the resident. I have reviewed the electronic medical record and amended it to reflect my history, review of systems, physical exam, assessment and plan.    Brief HPI:  84 yo M pmh IDDM type I, htn, on augmentin for RLE leg ulcer presents for shortness of breath, lethargy, altered mental status.  FSG "high" in field.  On arrival patient lethargic but following simple commands, deep and fast respirations (Kussmaul-like), protecting airway, crackles at lung bases, RLE ulcer with clean base, cool lower extremities, mm dry.  Hypertensive, tachy (sinus rhythm with PAC on monitor).  EKG non-stemi.  FSG >600.  Suspected DKA with hypertensive heart failure (hypertensive emergency), possible infectious source as well (cellulitis, pna, uti considered).  PIV x2 obtained.  Patient given crystalloid challenge given suspected dka.  Limited IV access at this time so nitro gtt withheld and hydralazine trialed for afterload reduction.  Respiratory called to initiate NIPPV given patient following commands and requiring respiratory support.  Anticipate MICU admission.

## 2021-06-20 NOTE — ED PROCEDURE NOTE - CPROC ED INDICATIONS1
minimal peripheral access/no peripheral access
airway protection/critical patient/mental status change

## 2021-06-20 NOTE — CONSULT NOTE ADULT - SUBJECTIVE AND OBJECTIVE BOX
HISTORY OF PRESENT ILLNESS:  Patient is a 85y old  Male who presents with a chief complaint of AMS, Hypoxia (2021 17:27)    HPI:  85 year old male unknown pmhx coming to hospital brought in by EMS today.  Per ED note and collateral info from son, patient not feeling well since yesterday and was given 0.4 nitro by son. Came to ED and reportedly hypoxic to 60s and lethargic. Mental status waxing and waning, patient was placed on bipap then subsequently intubated for airway protection in the ED. Patient found to be severely acidotic with worsening renal function from baseline, tachycardic and with leukocytosis. Given dose of vanc/zosyn, bcx drawn, 2L IVF, potassium, bicarb, started on insulin gtt.   Cardiology consult called for rising troponin 283 ->740        Allergies  dyes,iv dyes (Unknown)  iodinated radiocontrast agents (Hives)      MEDICATIONS:  aspirin  chewable 81 milliGRAM(s) Oral daily  buMETAnide Infusion 4 mG/Hr IV Continuous <Continuous>  buMETAnide IVPB 4 milliGRAM(s) IV Intermittent once  clopidogrel Tablet 75 milliGRAM(s) Oral daily  heparin   Injectable 3500 Unit(s) IV Push once  heparin  Infusion.  Unit(s)/Hr IV Continuous <Continuous>  propofol Infusion 10 MICROgram(s)/kG/Min IV Continuous <Continuous>  insulin regular Infusion 8 Unit(s)/Hr IV Continuous <Continuous>  chlorhexidine 0.12% Liquid 15 milliLiter(s) Oral Mucosa every 12 hours  magnesium sulfate  IVPB 2 Gram(s) IV Intermittent once  potassium chloride   Powder 40 milliEquivalent(s) Oral once  potassium chloride  10 mEq/100 mL IVPB 10 milliEquivalent(s) IV Intermittent every 1 hour      PAST MEDICAL & SURGICAL HISTORY:  HTN (hypertension)  Diabetes, type I        FAMILY HISTORY:      SOCIAL HISTORY:      Smoker: [ ] Active [ ] never  [ ] previous  Alcohol:  [ ] social [ ] daily [ ] never  Lives with:   Occupation:    REVIEW OF SYSTEMS  Intubated and sedated  >>> <<<    PHYSICAL EXAM:  T(C): 34.8 (21 @ 20:26), Max: 34.8 (21 @ 18:57)  HR: 90 (21 @ 20:26) (82 - 133)  BP: 154/88 (21 @ 20:26) (128/60 - 198/140)  RR: 28 (21 @ 20:26) (18 - 36)  SpO2: 100% (21 @ 20:26) (90% - 100%)  Wt(kg): --    Appearance: Intubated and sedated  Cardiovascular: Normal S1 S2, No JVD, No murmurs, No edema  Respiratory: Lungs clear to auscultation	  Psychiatry: A & O x 3, Mood & affect appropriate  Gastrointestinal:  Soft, Non-tender, + BS	  Skin: No rashes, No ecchymoses, No cyanosis	  Neurologic: Intubated and sedated  Extremities: Normal range of motion, No clubbing, cyanosis or edema  Vascular: Peripheral pulses palpable 2+ bilaterally    I&O's Summary    	 	  LABS:	 	                          13.4   11.45 )-----------( 142      ( 2021 15:13 )             43.9     06-20    140  |  100  |  20  ----------------------------<  584<HH>  2.8<LL>   |  12<L>  |  2.08<H>      125<L>  |  71<L>  |  15  ----------------------------<  1329<HH>  2.3<LL>   |  11<L>  |  1.58<H>    Ca    7.9<L>      2021 19:51  Ca    6.0<LL>      2021 19:00  Phos  4.9     -20  Phos  4.8     06-20  Mg     1.9     06-20  Mg     1.6     -20  TPro  7.3  /  Alb  3.8  /  TBili  0.7  /  DBili  x   /  AST  47<H>  /  ALT  24  /  AlkPhos  116  -20  LIVER FUNCTIONS - ( 2021 15:13 )  Alb: 3.8 g/dL / Pro: 7.3 g/dL / ALK PHOS: 116 U/L / ALT: 24 U/L / AST: 47 U/L / GGT: x         proBNP:   Lipid Profile:   HgA1c:   TSH:   CARDIAC MARKERS:  Blood Gas Venous - Lactate: 9.3 mmol/L ( @ 19:00)  Blood Gas Venous - Lactate: 11.7 mmol/L ( @ 15:13)  ABG - ( 2021 19:51 )  pH, Arterial: 7.27  pH, Blood: x     /  pCO2: 31    /  pO2: 192   / HCO3: 15    / Base Excess: -11.7 /  SaO2: 99.2    CAPILLARY BLOOD GLUCOSE  OCT Blood Glucose.: 522 mg/dL (2021 19:46)  Urinalysis Basic - ( 2021 16:48 )  Color: Yellow / Appearance: Clear / S.032 / pH: x  Gluc: x / Ketone: Trace  / Bili: Negative / Urobili: <2 mg/dL   Blood: x / Protein: >600 mg/dL / Nitrite: Negative   Leuk Esterase: Negative / RBC: 52 /HPF / WBC 9 /HPF   Sq Epi: x / Non Sq Epi: 8 /HPF / Bacteria: Few                     HISTORY OF PRESENT ILLNESS:  Patient is a 85y old  Male who presents with a chief complaint of AMS, Hypoxia (2021 17:27)    HPI:  85 year old male PMH per son CAD s/p stent(s) >10 yrs ago, DM, HTN BIBEMS for "not feeling well x 1 day" as reported by son. In ED patient hypoxic to 60s and lethargic. Mental status waxing and waning, patient was placed on bipap then subsequently intubated for airway protection in the ED. Patient severely acidotic with worsening renal function, hyperglycemic,  tachycardic and with leukocytosis. Given dose of vanc/zosyn, bcx drawn, 2L IVF, potassium, bicarb, started on insulin gtt and transferred to MICU. Cardiology consult called for rising troponin 283 ->740 and severe LVSD on POCUS. EKG NSR, HR 81, without acute ischemia.        Allergies  dyes,iv dyes (Unknown)  iodinated radiocontrast agents (Hives)      MEDICATIONS:  aspirin  chewable 81 milliGRAM(s) Oral daily  buMETAnide Infusion 4 mG/Hr IV Continuous <Continuous>  buMETAnide IVPB 4 milliGRAM(s) IV Intermittent once  clopidogrel Tablet 75 milliGRAM(s) Oral daily  heparin   Injectable 3500 Unit(s) IV Push once  heparin  Infusion.  Unit(s)/Hr IV Continuous <Continuous>  propofol Infusion 10 MICROgram(s)/kG/Min IV Continuous <Continuous>  insulin regular Infusion 8 Unit(s)/Hr IV Continuous <Continuous>  chlorhexidine 0.12% Liquid 15 milliLiter(s) Oral Mucosa every 12 hours  magnesium sulfate  IVPB 2 Gram(s) IV Intermittent once  potassium chloride   Powder 40 milliEquivalent(s) Oral once  potassium chloride  10 mEq/100 mL IVPB 10 milliEquivalent(s) IV Intermittent every 1 hour      PAST MEDICAL & SURGICAL HISTORY:  HTN (hypertension)  Diabetes, type I        FAMILY HISTORY: unknown      SOCIAL HISTORY:  unknown        REVIEW OF SYSTEMS  Intubated and sedated  >>> <<<    PHYSICAL EXAM:  T(C): 34.8 (21 @ 20:26), Max: 34.8 (21 @ 18:57)  HR: 90 (21 @ 20:26) (82 - 133)  BP: 154/88 (21 @ 20:26) (128/60 - 198/140)  RR: 28 (21 @ 20:26) (18 - 36)  SpO2: 100% (21 @ 20:26) (90% - 100%)  Wt(kg): --    Appearance: Intubated and sedated  Cardiovascular: Normal S1 S2, No edema  Respiratory: Lungs clear to auscultation	  Skin: No rashes, No ecchymoses, No cyanosis	  Neurologic: Intubated and sedated  Extremities: Normal range of motion, No clubbing, cyanosis  Vascular: Peripheral pulses palpable 2+ bilaterally    I&O's Summary    	 	  LABS:	 	                          13.4   11.45 )-----------( 142      ( 2021 15:13 )             43.9     20    140  |  100  |  20  ----------------------------<  584<HH>  2.8<LL>   |  12<L>  |  2.08<H>      125<L>  |  71<L>  |  15  ----------------------------<  1329<HH>  2.3<LL>   |  11<L>  |  1.58<H>    Ca    7.9<L>      2021 19:51  Ca    6.0<LL>      2021 19:00  Phos  4.9     -20  Phos  4.8     -20  Mg     1.9     -20  Mg     1.6     -20  TPro  7.3  /  Alb  3.8  /  TBili  0.7  /  DBili  x   /  AST  47<H>  /  ALT  24  /  AlkPhos  116  -20  LIVER FUNCTIONS - ( 2021 15:13 )  Alb: 3.8 g/dL / Pro: 7.3 g/dL / ALK PHOS: 116 U/L / ALT: 24 U/L / AST: 47 U/L / GGT: x         proBNP:   Lipid Profile:   HgA1c:   TSH:   CARDIAC MARKERS:  Blood Gas Venous - Lactate: 9.3 mmol/L ( @ 19:00)  Blood Gas Venous - Lactate: 11.7 mmol/L ( @ 15:13)  ABG - ( 2021 19:51 )  pH, Arterial: 7.27  pH, Blood: x     /  pCO2: 31    /  pO2: 192   / HCO3: 15    / Base Excess: -11.7 /  SaO2: 99.2    CAPILLARY BLOOD GLUCOSE  OCT Blood Glucose.: 522 mg/dL (2021 19:46)  Urinalysis Basic - ( 2021 16:48 )  Color: Yellow / Appearance: Clear / S.032 / pH: x  Gluc: x / Ketone: Trace  / Bili: Negative / Urobili: <2 mg/dL   Blood: x / Protein: >600 mg/dL / Nitrite: Negative   Leuk Esterase: Negative / RBC: 52 /HPF / WBC 9 /HPF   Sq Epi: x / Non Sq Epi: 8 /HPF / Bacteria: Few

## 2021-06-20 NOTE — CONSULT NOTE ADULT - ASSESSMENT
85 year old male PMH per son CAD s/p stent(s) >10 yrs ago, DM, HTN BIBEMS for "not feeling well x 1 day" as reported by son. In ED patient hypoxic to 60s and lethargic. Mental status waxing and waning, patient was placed on bipap then subsequently intubated for airway protection in the ED. Patient severely acidotic with worsening renal function, hyperglycemic,  tachycardic and with leukocytosis. Given dose of vanc/zosyn, bcx drawn, 2L IVF, potassium, bicarb, started on insulin gtt and transferred to MICU. Cardiology consult called for rising troponin 283 ->740 and severe LVSD on POCUS. EKG NSR, HR 81, without acute ischemia. 85 year old male PMH per son CAD s/p stent(s) >10 yrs ago, DM, HTN BIBEMS for "not feeling well x 1 day" as reported by son. In ED patient hypoxic to 60s and lethargic. Mental status waxing and waning, patient was placed on bipap then subsequently intubated for airway protection in the ED. Patient severely acidotic with worsening renal function, hyperglycemic,  tachycardic and with leukocytosis. Given dose of vanc/zosyn, bcx drawn, 2L IVF, potassium, bicarb, started on insulin gtt and transferred to MICU. Cardiology consult called for rising troponin 283 ->740 and severe LVSD on POCUS. EKG NSR, HR 81, without acute ischemia.

## 2021-06-20 NOTE — ED ADULT TRIAGE NOTE - CHIEF COMPLAINT QUOTE
Pt BIBA from home as AMS, was found to be hypoxic in 60s on room air, BGL read HI by EMS. Pt placed on 15L NRB. Taken to TRB

## 2021-06-20 NOTE — CONSULT NOTE ADULT - SUBJECTIVE AND OBJECTIVE BOX
[facility] Division of Kidney Diseases & Hypertension  INITIAL CONSULT NOTE  [phone]  --------------------------------------------------------------------------------  HPI:      PAST HISTORY  --------------------------------------------------------------------------------  PAST MEDICAL & SURGICAL HISTORY:  HTN (hypertension)    Diabetes, type I      FAMILY HISTORY:    PAST SOCIAL HISTORY:  ALLERGIES & MEDICATIONS  --------------------------------------------------------------------------------  Allergies    dyes,iv dyes (Unknown)  iodinated radiocontrast agents (Hives)    Intolerances      Standing Inpatient Medications  aspirin  chewable 81 milliGRAM(s) Oral daily  buMETAnide Infusion 4 mG/Hr IV Continuous <Continuous>  buMETAnide IVPB 4 milliGRAM(s) IV Intermittent once  chlorhexidine 0.12% Liquid 15 milliLiter(s) Oral Mucosa every 12 hours  clopidogrel Tablet 75 milliGRAM(s) Oral daily  heparin   Injectable 3500 Unit(s) IV Push once  heparin  Infusion.  Unit(s)/Hr IV Continuous <Continuous>  insulin regular Infusion 4 Unit(s)/Hr IV Continuous <Continuous>  magnesium sulfate  IVPB 2 Gram(s) IV Intermittent once  potassium chloride   Powder 40 milliEquivalent(s) Oral once  potassium chloride  10 mEq/100 mL IVPB 10 milliEquivalent(s) IV Intermittent every 1 hour  propofol Infusion 10 MICROgram(s)/kG/Min IV Continuous <Continuous>    PRN Inpatient Medications    REVIEW OF SYSTEMS  --------------------------------------------------------------------------------  Gen: No  fevers/chills, weakness  Skin: No rashes  Head/Eyes/Ears/Mouth: No headache; Normal hearing; Normal vision w/o blurriness;   Respiratory: No dyspnea, cough, wheezing, hemoptysis  CV: No chest pain,   GI: No abdominal pain, diarrhea, constipation, nausea, vomiting  : No increased frequency, dysuria, hematuria, nocturia  MSK: No joint pain/swelling;   Neuro: No dizziness/lightheadedness, weakness, seizures    All other systems were reviewed and are negative, except as noted.    VITALS/PHYSICAL EXAM  --------------------------------------------------------------------------------  T(C): 34.8 (06-20-21 @ 20:26), Max: 34.8 (06-20-21 @ 18:57)  HR: 82 (06-20-21 @ 21:00) (82 - 133)  BP: 158/87 (06-20-21 @ 21:00) (128/60 - 198/140)  RR: 28 (06-20-21 @ 21:00) (18 - 36)  SpO2: 100% (06-20-21 @ 21:00) (90% - 100%)  Wt(kg): --    Weight (kg): 56.7 (06-20-21 @ 16:02)      Physical Exam:  	Gen: NAD, well-appearing  	HEENT: PERRL, supple neck  	Pulm: CTA B/L  	CV:  S1S2  	Abd: +BS, soft   	Ext: No B/L Lower ext edema  	Neuro: No focal deficits  	Skin: Warm, without rashes  	Vascular access:     LABS/STUDIES  --------------------------------------------------------------------------------              13.4   11.45 >-----------<  142      [06-20-21 @ 15:13]              43.9     140  |  100  |  20  ----------------------------<  584      [06-20-21 @ 19:51]  2.8   |  12  |  2.08        Ca     7.9     [06-20-21 @ 19:51]      Mg     1.9     [06-20-21 @ 19:51]      Phos  4.9     [06-20-21 @ 19:51]    TPro  7.3  /  Alb  3.8  /  TBili  0.7  /  DBili  x   /  AST  47  /  ALT  24  /  AlkPhos  116  [06-20-21 @ 15:13]          Creatinine Trend:  SCr 2.08 [06-20 @ 19:51]  SCr 1.58 [06-20 @ 19:00]  SCr 2.09 [06-20 @ 15:13]    Urinalysis - [06-20-21 @ 16:48]      Color Yellow / Appearance Clear / SG 1.032 / pH 6.5      Gluc >= 1000 mg/dL / Ketone Trace  / Bili Negative / Urobili <2 mg/dL       Blood Moderate / Protein >600 mg/dL / Leuk Est Negative / Nitrite Negative      RBC 52 / WBC 9 / Hyaline 1 / Gran  / Sq Epi  / Non Sq Epi 8 / Bacteria Few           [facility] Division of Kidney Diseases & Hypertension  INITIAL CONSULT NOTE  [phone]  --------------------------------------------------------------------------------  HPI: 85 year old male with PMHx HTN, DM  was brought in by EMS for lethargic and respiratory failure. Per ED note and collateral info from son, patient not feeling well since yesterday and was given 0.4 nitro by son. Pt presented to the ED and was reportedly hypoxic to 60s and lethargic. Mental status waxing and waning, patient was placed on BIPAP then subsequently intubated for airway protection.    On admission, labs notable for metabolic acidosis, elevated lactate (pH: 7.02, serum CO2 low at 8 with lactate 11.7) and hyperglycemia. Pt received Vancomycin/Zosyn, 2L IVF, 1 amp sodium bicarbonate and started on Insulin drip. Nephrology team consulted for LINDSEY and acidosis. No previous labs for review on [facility]. On admission, pt with elevated SCr of 2.09 (6/10). Pt seen and examined in ICU. Pt intubated with FIO2: 50% PEEP+8) and sedated. BP stable without any IV vasopressor support. Pt on insulin drip for possible DKA. CXR with bilateral pleural effusions. Review of meds showed pt on Atenolol, Telmisartan, Isosorbide and Colesevelam.     PAST HISTORY  --------------------------------------------------------------------------------  PAST MEDICAL & SURGICAL HISTORY:  HTN (hypertension)  Diabetes, type I    FAMILY HISTORY:    PAST SOCIAL HISTORY:  ALLERGIES & MEDICATIONS  --------------------------------------------------------------------------------  Allergies    dyes,iv dyes (Unknown)  iodinated radiocontrast agents (Hives)    Intolerances      Standing Inpatient Medications  aspirin  chewable 81 milliGRAM(s) Oral daily  buMETAnide Infusion 4 mG/Hr IV Continuous <Continuous>  buMETAnide IVPB 4 milliGRAM(s) IV Intermittent once  chlorhexidine 0.12% Liquid 15 milliLiter(s) Oral Mucosa every 12 hours  clopidogrel Tablet 75 milliGRAM(s) Oral daily  heparin   Injectable 3500 Unit(s) IV Push once  heparin  Infusion.  Unit(s)/Hr IV Continuous <Continuous>  insulin regular Infusion 4 Unit(s)/Hr IV Continuous <Continuous>  magnesium sulfate  IVPB 2 Gram(s) IV Intermittent once  potassium chloride   Powder 40 milliEquivalent(s) Oral once  potassium chloride  10 mEq/100 mL IVPB 10 milliEquivalent(s) IV Intermittent every 1 hour  propofol Infusion 10 MICROgram(s)/kG/Min IV Continuous <Continuous>    PRN Inpatient Medications    REVIEW OF SYSTEMS  --------------------------------------------------------------------------------  Unable to obtain due to clinical condition    VITALS/PHYSICAL EXAM  --------------------------------------------------------------------------------  T(C): 34.8 (06-20-21 @ 20:26), Max: 34.8 (06-20-21 @ 18:57)  HR: 82 (06-20-21 @ 21:00) (82 - 133)  BP: 158/87 (06-20-21 @ 21:00) (128/60 - 198/140)  RR: 28 (06-20-21 @ 21:00) (18 - 36)  SpO2: 100% (06-20-21 @ 21:00) (90% - 100%)  Wt(kg): --    Weight (kg): 56.7 (06-20-21 @ 16:02)    Physical Exam:  	Gen: Intubated and sedated  	HEENT: +ETT  	Pulm: mechanical breath sounds, fair air entry, decreased  	CV:  S1S2  	Abd: +BS, soft   	Ext: +bilateral LE edema, IO line on right leg  	Neuro: sedated  	Skin: Warm, without rashes  	Vascular access:     LABS/STUDIES  --------------------------------------------------------------------------------              13.4   11.45 >-----------<  142      [06-20-21 @ 15:13]              43.9     140  |  100  |  20  ----------------------------<  584      [06-20-21 @ 19:51]  2.8   |  12  |  2.08        Ca     7.9     [06-20-21 @ 19:51]      Mg     1.9     [06-20-21 @ 19:51]      Phos  4.9     [06-20-21 @ 19:51]    TPro  7.3  /  Alb  3.8  /  TBili  0.7  /  DBili  x   /  AST  47  /  ALT  24  /  AlkPhos  116  [06-20-21 @ 15:13]    Creatinine Trend:  SCr 2.08 [06-20 @ 19:51]  SCr 1.58 [06-20 @ 19:00]  SCr 2.09 [06-20 @ 15:13]    Urinalysis - [06-20-21 @ 16:48]      Color Yellow / Appearance Clear / SG 1.032 / pH 6.5      Gluc >= 1000 mg/dL / Ketone Trace  / Bili Negative / Urobili <2 mg/dL       Blood Moderate / Protein >600 mg/dL / Leuk Est Negative / Nitrite Negative      RBC 52 / WBC 9 / Hyaline 1 / Gran  / Sq Epi  / Non Sq Epi 8 / Bacteria Few

## 2021-06-20 NOTE — ED ADULT NURSE REASSESSMENT NOTE - NS ED NURSE REASSESS COMMENT FT1
RN Facilitator: Pt appears lethargic, not following commands, currently on bipap. Dr. Bloch & Dr. Llamas called to bedside. Pt intubated, ET tube size 7.5 used, 23 at the lip line.

## 2021-06-20 NOTE — CHART NOTE - NSCHARTNOTEFT_GEN_A_CORE
: Elfego Angulo    INDICATION: Respiratory Failure; renal failure; lactic acidosis    PROCEDURE:  [x] LIMITED ECHO  [x] LIMITED CHEST  [x] LIMITED RETROPERITONEAL      FINDINGS:  Lungs: Anterior A-line predominance. Lateral B-line predominance. Posterior bilateral heterogeneous large pleural effusions.  Heart: Severe LV systolic dysfunction. LVOT VTI: 8.4cm. Diastolic dysfunction with increased LAP. E: 34, A: 56  E/A: 0.61  E': 2 E/E': 16.67 Normal RA/RV with preserved systolic function. RVSP estimated at 46mmHg assuming RAP of 15mmHg.   Renal: Increased cortical echogenicity when compared to liver. Normal size. No hydro.      INTERPRETATION:  Bilateral heterogenous pleural effusions  Severe segmental LV systolic dysfunction (akenesis of anterior wall) with diastolic dysfunction with increased LAP  Moderate Pulmonary Hypertension  Medical Renal Disease    Images stored on [employer].

## 2021-06-20 NOTE — ED PROVIDER NOTE - SHIFT CHANGE DETAILS
NOEMI:  Patient signed out to Dr. Bloch pending cmp.  VBG with mixed metabolic and respiratory acidosis.  Worsening on bipap at time of signout, decision made to intubation.  Decision made to start insulin gtt with cmp pending given critical illness and reassuring k on vbg, potassium to be supplemented with insulin gtt.  Patient critically ill, intubated, however bp reassuring at time of signout. NOEMI:  Patient signed out to Dr. Bloch pending MICU evaluation, post-intubation cxr, cmp still not resulted at time of sign out.  VBG with mixed metabolic and respiratory acidosis.  Worsening on bipap at time of signout, decision made for intubation which was performed.  Decision made to start insulin gtt with cmp pending given critical illness and reassuring k on vbg, potassium to be supplemented with insulin gtt.  Patient critically ill, intubated, however bp improved on propofol at time of signout.

## 2021-06-20 NOTE — ED ADULT NURSE NOTE - OBJECTIVE STATEMENT
Pt arrived to [facility], tachypneic, RR 34 skin cool diaphoretic and clammy, son states he has not been feeling well since yesterday, gave him 0.4 of nitro PTA as he thought it would make him feel better. Pt arousalble to verbal commands, but very lethargic. MD at bedside, 16G PIV placed per ED resident Sandra, 20G PIV placed. labs draw and sent, EKG preformed, Pt changed, gown provided placed on cardiac monitor, ST noted. Noted BLE wounds oozing and weeping. IVF started per order. FS HI. Bipap initiated

## 2021-06-20 NOTE — ED PROCEDURE NOTE - PROCEDURE DATE TIME, MLM
20-Jun-2021 17:55
20-Jun-2021 17:39
[Follow-Up Evaluation] : a follow-up evaluation for
[Developmental Delay] : developmental delay
[Mother] : mother
[Other: _____] : [unfilled]
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

## 2021-06-20 NOTE — H&P ADULT - HISTORY OF PRESENT ILLNESS
85 year old male unknown pmhx coming to hospital brought in by EMS today.  Per ED note and collateral info from son, patient not feeling well since yesterday and was given 0.4 nitro by son. Came to ED and reportedly hypoxic to 60s and lethargic. Mental status waxing and waning, patient was placed on bipap then subsequently intubated for airway protection in the ED. Patient found to be severely acidotic with worsening renal function from baseline as as tachycardic and leukocytosis. GIven  85 year old male unknown pmhx coming to hospital brought in by EMS today.  Per ED note and collateral info from son, patient not feeling well since yesterday and was given 0.4 nitro by son. Came to ED and reportedly hypoxic to 60s and lethargic. Mental status waxing and waning, patient was placed on bipap then subsequently intubated for airway protection in the ED. Patient found to be severely acidotic with worsening renal function from baseline as as tachycardic and leukocytosis. Given dose of vanc/zosyn, bcx drawn, 2L IVF, potassium, bicarb, started on insulin gtt.

## 2021-06-21 LAB
ALBUMIN SERPL ELPH-MCNC: 2.9 G/DL — LOW (ref 3.3–5)
ALBUMIN SERPL ELPH-MCNC: 2.9 G/DL — LOW (ref 3.3–5)
ALBUMIN SERPL ELPH-MCNC: 3.1 G/DL — LOW (ref 3.3–5)
ALP SERPL-CCNC: 89 U/L — SIGNIFICANT CHANGE UP (ref 40–120)
ALP SERPL-CCNC: 90 U/L — SIGNIFICANT CHANGE UP (ref 40–120)
ALP SERPL-CCNC: 99 U/L — SIGNIFICANT CHANGE UP (ref 40–120)
ALT FLD-CCNC: 1511 U/L — HIGH (ref 4–41)
ALT FLD-CCNC: 1560 U/L — HIGH (ref 4–41)
ALT FLD-CCNC: 1675 U/L — HIGH (ref 4–41)
ANION GAP SERPL CALC-SCNC: 16 MMOL/L — HIGH (ref 7–14)
ANION GAP SERPL CALC-SCNC: 16 MMOL/L — HIGH (ref 7–14)
ANION GAP SERPL CALC-SCNC: 18 MMOL/L — HIGH (ref 7–14)
ANION GAP SERPL CALC-SCNC: 19 MMOL/L — HIGH (ref 7–14)
ANION GAP SERPL CALC-SCNC: 24 MMOL/L — HIGH (ref 7–14)
APTT BLD: 77.1 SEC — HIGH (ref 27–36.3)
APTT BLD: 77.6 SEC — HIGH (ref 27–36.3)
APTT BLD: 96.2 SEC — HIGH (ref 27–36.3)
AST SERPL-CCNC: 2312 U/L — HIGH (ref 4–40)
AST SERPL-CCNC: 3385 U/L — HIGH (ref 4–40)
AST SERPL-CCNC: 3758 U/L — HIGH (ref 4–40)
BILIRUB SERPL-MCNC: 0.8 MG/DL — SIGNIFICANT CHANGE UP (ref 0.2–1.2)
BILIRUB SERPL-MCNC: 0.9 MG/DL — SIGNIFICANT CHANGE UP (ref 0.2–1.2)
BILIRUB SERPL-MCNC: 1 MG/DL — SIGNIFICANT CHANGE UP (ref 0.2–1.2)
BLOOD GAS ARTERIAL - LYTES,HGB,ICA,LACT RESULT: SIGNIFICANT CHANGE UP
BLOOD GAS ARTERIAL COMPREHENSIVE RESULT: SIGNIFICANT CHANGE UP
BLOOD GAS VENOUS COMPREHENSIVE RESULT: SIGNIFICANT CHANGE UP
BUN SERPL-MCNC: 21 MG/DL — SIGNIFICANT CHANGE UP (ref 7–23)
BUN SERPL-MCNC: 23 MG/DL — SIGNIFICANT CHANGE UP (ref 7–23)
BUN SERPL-MCNC: 24 MG/DL — HIGH (ref 7–23)
BUN SERPL-MCNC: 25 MG/DL — HIGH (ref 7–23)
BUN SERPL-MCNC: 26 MG/DL — HIGH (ref 7–23)
CALCIUM SERPL-MCNC: 8.5 MG/DL — SIGNIFICANT CHANGE UP (ref 8.4–10.5)
CALCIUM SERPL-MCNC: 8.7 MG/DL — SIGNIFICANT CHANGE UP (ref 8.4–10.5)
CALCIUM SERPL-MCNC: 8.9 MG/DL — SIGNIFICANT CHANGE UP (ref 8.4–10.5)
CALCIUM SERPL-MCNC: 9 MG/DL — SIGNIFICANT CHANGE UP (ref 8.4–10.5)
CALCIUM SERPL-MCNC: 9.1 MG/DL — SIGNIFICANT CHANGE UP (ref 8.4–10.5)
CHLORIDE SERPL-SCNC: 102 MMOL/L — SIGNIFICANT CHANGE UP (ref 98–107)
CHLORIDE SERPL-SCNC: 104 MMOL/L — SIGNIFICANT CHANGE UP (ref 98–107)
CHLORIDE SERPL-SCNC: 107 MMOL/L — SIGNIFICANT CHANGE UP (ref 98–107)
CK MB BLD-MCNC: 5.6 % — HIGH (ref 0–2.5)
CK MB CFR SERPL CALC: 80.3 NG/ML — HIGH
CK MB CFR SERPL CALC: 97.4 NG/ML — HIGH
CK SERPL-CCNC: 1437 U/L — HIGH (ref 30–200)
CO2 SERPL-SCNC: 11 MMOL/L — LOW (ref 22–31)
CO2 SERPL-SCNC: 18 MMOL/L — LOW (ref 22–31)
CO2 SERPL-SCNC: 19 MMOL/L — LOW (ref 22–31)
CO2 SERPL-SCNC: 19 MMOL/L — LOW (ref 22–31)
CO2 SERPL-SCNC: 22 MMOL/L — SIGNIFICANT CHANGE UP (ref 22–31)
CREAT ?TM UR-MCNC: 7 MG/DL — SIGNIFICANT CHANGE UP
CREAT SERPL-MCNC: 2.08 MG/DL — HIGH (ref 0.5–1.3)
CREAT SERPL-MCNC: 2.34 MG/DL — HIGH (ref 0.5–1.3)
CREAT SERPL-MCNC: 2.34 MG/DL — HIGH (ref 0.5–1.3)
CREAT SERPL-MCNC: 2.49 MG/DL — HIGH (ref 0.5–1.3)
CREAT SERPL-MCNC: 2.64 MG/DL — HIGH (ref 0.5–1.3)
CREAT SERPL-MCNC: 2.73 MG/DL — HIGH (ref 0.5–1.3)
GLUCOSE BLDC GLUCOMTR-MCNC: 100 MG/DL — HIGH (ref 70–99)
GLUCOSE BLDC GLUCOMTR-MCNC: 115 MG/DL — HIGH (ref 70–99)
GLUCOSE BLDC GLUCOMTR-MCNC: 122 MG/DL — HIGH (ref 70–99)
GLUCOSE BLDC GLUCOMTR-MCNC: 127 MG/DL — HIGH (ref 70–99)
GLUCOSE BLDC GLUCOMTR-MCNC: 132 MG/DL — HIGH (ref 70–99)
GLUCOSE BLDC GLUCOMTR-MCNC: 140 MG/DL — HIGH (ref 70–99)
GLUCOSE BLDC GLUCOMTR-MCNC: 146 MG/DL — HIGH (ref 70–99)
GLUCOSE BLDC GLUCOMTR-MCNC: 152 MG/DL — HIGH (ref 70–99)
GLUCOSE BLDC GLUCOMTR-MCNC: 156 MG/DL — HIGH (ref 70–99)
GLUCOSE BLDC GLUCOMTR-MCNC: 158 MG/DL — HIGH (ref 70–99)
GLUCOSE BLDC GLUCOMTR-MCNC: 182 MG/DL — HIGH (ref 70–99)
GLUCOSE BLDC GLUCOMTR-MCNC: 193 MG/DL — HIGH (ref 70–99)
GLUCOSE BLDC GLUCOMTR-MCNC: 202 MG/DL — HIGH (ref 70–99)
GLUCOSE BLDC GLUCOMTR-MCNC: 216 MG/DL — HIGH (ref 70–99)
GLUCOSE BLDC GLUCOMTR-MCNC: 236 MG/DL — HIGH (ref 70–99)
GLUCOSE BLDC GLUCOMTR-MCNC: 279 MG/DL — HIGH (ref 70–99)
GLUCOSE BLDC GLUCOMTR-MCNC: 286 MG/DL — HIGH (ref 70–99)
GLUCOSE BLDC GLUCOMTR-MCNC: 291 MG/DL — HIGH (ref 70–99)
GLUCOSE BLDC GLUCOMTR-MCNC: 313 MG/DL — HIGH (ref 70–99)
GLUCOSE BLDC GLUCOMTR-MCNC: 346 MG/DL — HIGH (ref 70–99)
GLUCOSE BLDC GLUCOMTR-MCNC: 387 MG/DL — HIGH (ref 70–99)
GLUCOSE BLDC GLUCOMTR-MCNC: 408 MG/DL — HIGH (ref 70–99)
GLUCOSE BLDC GLUCOMTR-MCNC: 482 MG/DL — CRITICAL HIGH (ref 70–99)
GLUCOSE BLDC GLUCOMTR-MCNC: 72 MG/DL — SIGNIFICANT CHANGE UP (ref 70–99)
GLUCOSE BLDC GLUCOMTR-MCNC: 79 MG/DL — SIGNIFICANT CHANGE UP (ref 70–99)
GLUCOSE SERPL-MCNC: 185 MG/DL — HIGH (ref 70–99)
GLUCOSE SERPL-MCNC: 241 MG/DL — HIGH (ref 70–99)
GLUCOSE SERPL-MCNC: 416 MG/DL — HIGH (ref 70–99)
GLUCOSE SERPL-MCNC: 77 MG/DL — SIGNIFICANT CHANGE UP (ref 70–99)
GLUCOSE SERPL-MCNC: 94 MG/DL — SIGNIFICANT CHANGE UP (ref 70–99)
HCT VFR BLD CALC: 36.2 % — LOW (ref 39–50)
HGB BLD-MCNC: 11.9 G/DL — LOW (ref 13–17)
INR BLD: 1.65 RATIO — HIGH (ref 0.88–1.16)
INR BLD: 1.91 RATIO — HIGH (ref 0.88–1.16)
LEGIONELLA AG UR QL: NEGATIVE — SIGNIFICANT CHANGE UP
MAGNESIUM SERPL-MCNC: 2 MG/DL — SIGNIFICANT CHANGE UP (ref 1.6–2.6)
MAGNESIUM SERPL-MCNC: 2.3 MG/DL — SIGNIFICANT CHANGE UP (ref 1.6–2.6)
MAGNESIUM SERPL-MCNC: 2.4 MG/DL — SIGNIFICANT CHANGE UP (ref 1.6–2.6)
MAGNESIUM SERPL-MCNC: 3.1 MG/DL — HIGH (ref 1.6–2.6)
MCHC RBC-ENTMCNC: 29.6 PG — SIGNIFICANT CHANGE UP (ref 27–34)
MCHC RBC-ENTMCNC: 32.9 GM/DL — SIGNIFICANT CHANGE UP (ref 32–36)
MCV RBC AUTO: 90 FL — SIGNIFICANT CHANGE UP (ref 80–100)
NRBC # BLD: 0 /100 WBCS — SIGNIFICANT CHANGE UP
NRBC # FLD: 0.03 K/UL — HIGH
PHOSPHATE SERPL-MCNC: 0.5 MG/DL — CRITICAL LOW (ref 2.5–4.5)
PHOSPHATE SERPL-MCNC: 1.6 MG/DL — LOW (ref 2.5–4.5)
PHOSPHATE SERPL-MCNC: 3.3 MG/DL — SIGNIFICANT CHANGE UP (ref 2.5–4.5)
PHOSPHATE SERPL-MCNC: 3.4 MG/DL — SIGNIFICANT CHANGE UP (ref 2.5–4.5)
PLATELET # BLD AUTO: 125 K/UL — LOW (ref 150–400)
POTASSIUM SERPL-MCNC: 3.5 MMOL/L — SIGNIFICANT CHANGE UP (ref 3.5–5.3)
POTASSIUM SERPL-MCNC: 3.9 MMOL/L — SIGNIFICANT CHANGE UP (ref 3.5–5.3)
POTASSIUM SERPL-MCNC: 4.4 MMOL/L — SIGNIFICANT CHANGE UP (ref 3.5–5.3)
POTASSIUM SERPL-SCNC: 3.5 MMOL/L — SIGNIFICANT CHANGE UP (ref 3.5–5.3)
POTASSIUM SERPL-SCNC: 3.9 MMOL/L — SIGNIFICANT CHANGE UP (ref 3.5–5.3)
POTASSIUM SERPL-SCNC: 4.4 MMOL/L — SIGNIFICANT CHANGE UP (ref 3.5–5.3)
PROT ?TM UR-MCNC: 32 MG/DL — SIGNIFICANT CHANGE UP
PROT SERPL-MCNC: 5.7 G/DL — LOW (ref 6–8.3)
PROT SERPL-MCNC: 5.7 G/DL — LOW (ref 6–8.3)
PROT SERPL-MCNC: 5.8 G/DL — LOW (ref 6–8.3)
PROT/CREAT UR-RTO: 4.6 RATIO — HIGH (ref 0–0.2)
PROTHROM AB SERPL-ACNC: 18.5 SEC — HIGH (ref 10.6–13.6)
PROTHROM AB SERPL-ACNC: 21.1 SEC — HIGH (ref 10.6–13.6)
RBC # BLD: 4.02 M/UL — LOW (ref 4.2–5.8)
RBC # FLD: 13.2 % — SIGNIFICANT CHANGE UP (ref 10.3–14.5)
SODIUM SERPL-SCNC: 139 MMOL/L — SIGNIFICANT CHANGE UP (ref 135–145)
SODIUM SERPL-SCNC: 140 MMOL/L — SIGNIFICANT CHANGE UP (ref 135–145)
SODIUM SERPL-SCNC: 140 MMOL/L — SIGNIFICANT CHANGE UP (ref 135–145)
SODIUM SERPL-SCNC: 142 MMOL/L — SIGNIFICANT CHANGE UP (ref 135–145)
SODIUM SERPL-SCNC: 142 MMOL/L — SIGNIFICANT CHANGE UP (ref 135–145)
TROPONIN T, HIGH SENSITIVITY RESULT: 9554 NG/L — CRITICAL HIGH
TROPONIN T, HIGH SENSITIVITY RESULT: SIGNIFICANT CHANGE UP NG/L
VANCOMYCIN FLD-MCNC: 13.7 UG/ML — SIGNIFICANT CHANGE UP
WBC # BLD: 14.58 K/UL — HIGH (ref 3.8–10.5)
WBC # FLD AUTO: 14.58 K/UL — HIGH (ref 3.8–10.5)

## 2021-06-21 PROCEDURE — 93306 TTE W/DOPPLER COMPLETE: CPT | Mod: 26

## 2021-06-21 PROCEDURE — 36556 INSERT NON-TUNNEL CV CATH: CPT

## 2021-06-21 PROCEDURE — 76770 US EXAM ABDO BACK WALL COMP: CPT | Mod: 26

## 2021-06-21 PROCEDURE — 99223 1ST HOSP IP/OBS HIGH 75: CPT

## 2021-06-21 PROCEDURE — 43752 NASAL/OROGASTRIC W/TUBE PLMT: CPT | Mod: 59

## 2021-06-21 PROCEDURE — 99291 CRITICAL CARE FIRST HOUR: CPT

## 2021-06-21 RX ORDER — PIPERACILLIN AND TAZOBACTAM 4; .5 G/20ML; G/20ML
3.38 INJECTION, POWDER, LYOPHILIZED, FOR SOLUTION INTRAVENOUS EVERY 8 HOURS
Refills: 0 | Status: DISCONTINUED | OUTPATIENT
Start: 2021-06-21 | End: 2021-06-21

## 2021-06-21 RX ORDER — INSULIN HUMAN 100 [IU]/ML
1 INJECTION, SOLUTION SUBCUTANEOUS
Qty: 100 | Refills: 0 | Status: DISCONTINUED | OUTPATIENT
Start: 2021-06-21 | End: 2021-06-22

## 2021-06-21 RX ORDER — SODIUM CHLORIDE 9 MG/ML
1000 INJECTION, SOLUTION INTRAVENOUS
Refills: 0 | Status: DISCONTINUED | OUTPATIENT
Start: 2021-06-21 | End: 2021-06-21

## 2021-06-21 RX ORDER — CHLORHEXIDINE GLUCONATE 213 G/1000ML
1 SOLUTION TOPICAL DAILY
Refills: 0 | Status: DISCONTINUED | OUTPATIENT
Start: 2021-06-21 | End: 2021-06-30

## 2021-06-21 RX ORDER — VANCOMYCIN HCL 1 G
1000 VIAL (EA) INTRAVENOUS EVERY 12 HOURS
Refills: 0 | Status: DISCONTINUED | OUTPATIENT
Start: 2021-06-21 | End: 2021-06-21

## 2021-06-21 RX ORDER — HYDRALAZINE HCL 50 MG
5 TABLET ORAL EVERY 6 HOURS
Refills: 0 | Status: DISCONTINUED | OUTPATIENT
Start: 2021-06-21 | End: 2021-06-25

## 2021-06-21 RX ORDER — POTASSIUM PHOSPHATE, MONOBASIC POTASSIUM PHOSPHATE, DIBASIC 236; 224 MG/ML; MG/ML
30 INJECTION, SOLUTION INTRAVENOUS ONCE
Refills: 0 | Status: COMPLETED | OUTPATIENT
Start: 2021-06-21 | End: 2021-06-21

## 2021-06-21 RX ORDER — MEROPENEM 1 G/30ML
500 INJECTION INTRAVENOUS EVERY 12 HOURS
Refills: 0 | Status: DISCONTINUED | OUTPATIENT
Start: 2021-06-21 | End: 2021-06-22

## 2021-06-21 RX ORDER — HYDRALAZINE HCL 50 MG
2.5 TABLET ORAL ONCE
Refills: 0 | Status: COMPLETED | OUTPATIENT
Start: 2021-06-21 | End: 2021-06-21

## 2021-06-21 RX ORDER — DEXTROSE MONOHYDRATE, SODIUM CHLORIDE, AND POTASSIUM CHLORIDE 50; .745; 4.5 G/1000ML; G/1000ML; G/1000ML
1000 INJECTION, SOLUTION INTRAVENOUS
Refills: 0 | Status: DISCONTINUED | OUTPATIENT
Start: 2021-06-21 | End: 2021-06-22

## 2021-06-21 RX ADMIN — Medication 110 MILLIGRAM(S): at 17:39

## 2021-06-21 RX ADMIN — Medication 5 MILLIGRAM(S): at 20:03

## 2021-06-21 RX ADMIN — HEPARIN SODIUM 600 UNIT(S)/HR: 5000 INJECTION INTRAVENOUS; SUBCUTANEOUS at 22:00

## 2021-06-21 RX ADMIN — CHLORHEXIDINE GLUCONATE 1 APPLICATION(S): 213 SOLUTION TOPICAL at 16:54

## 2021-06-21 RX ADMIN — Medication 2.5 MILLIGRAM(S): at 13:13

## 2021-06-21 RX ADMIN — HEPARIN SODIUM 650 UNIT(S)/HR: 5000 INJECTION INTRAVENOUS; SUBCUTANEOUS at 14:24

## 2021-06-21 RX ADMIN — POTASSIUM PHOSPHATE, MONOBASIC POTASSIUM PHOSPHATE, DIBASIC 83.33 MILLIMOLE(S): 236; 224 INJECTION, SOLUTION INTRAVENOUS at 06:45

## 2021-06-21 RX ADMIN — CHLORHEXIDINE GLUCONATE 15 MILLILITER(S): 213 SOLUTION TOPICAL at 17:54

## 2021-06-21 RX ADMIN — CLOPIDOGREL BISULFATE 75 MILLIGRAM(S): 75 TABLET, FILM COATED ORAL at 12:59

## 2021-06-21 RX ADMIN — INSULIN HUMAN 4 UNIT(S)/HR: 100 INJECTION, SOLUTION SUBCUTANEOUS at 13:00

## 2021-06-21 RX ADMIN — BUMETANIDE 20 MG/HR: 0.25 INJECTION INTRAMUSCULAR; INTRAVENOUS at 20:02

## 2021-06-21 RX ADMIN — Medication 110 MILLIGRAM(S): at 05:31

## 2021-06-21 RX ADMIN — BUMETANIDE 20 MG/HR: 0.25 INJECTION INTRAMUSCULAR; INTRAVENOUS at 13:00

## 2021-06-21 RX ADMIN — DEXTROSE MONOHYDRATE, SODIUM CHLORIDE, AND POTASSIUM CHLORIDE 100 MILLILITER(S): 50; .745; 4.5 INJECTION, SOLUTION INTRAVENOUS at 14:22

## 2021-06-21 RX ADMIN — CHLORHEXIDINE GLUCONATE 15 MILLILITER(S): 213 SOLUTION TOPICAL at 05:32

## 2021-06-21 RX ADMIN — Medication 1 APPLICATION(S): at 17:50

## 2021-06-21 RX ADMIN — PROPOFOL 3.4 MICROGRAM(S)/KG/MIN: 10 INJECTION, EMULSION INTRAVENOUS at 13:01

## 2021-06-21 RX ADMIN — MEROPENEM 100 MILLIGRAM(S): 1 INJECTION INTRAVENOUS at 05:31

## 2021-06-21 RX ADMIN — MEROPENEM 100 MILLIGRAM(S): 1 INJECTION INTRAVENOUS at 17:39

## 2021-06-21 RX ADMIN — Medication 81 MILLIGRAM(S): at 12:59

## 2021-06-21 NOTE — PROGRESS NOTE ADULT - ASSESSMENT
Pt with LINDSEY and metabolic acidosis    LINDSEY- Pt with hemodynamically mediated LINDSEY in the setting of infection?, DKA and medication use (Telmisartan). No previous labs for review. On admission, SCr elevated at 2.09 (6/20). UA dirty with proteinuria, hematuria, pyuria. POCUS exam by ICU team showed evidence suggestive of chronic kidney disease.  Send urine electrolytes and spot urine TP/CR. Send for serum and urine immunofixation. Obtain Kidney sonogram. Pt appears hypervolemic on examination. CXR findings reviewed. Continue Bumex IV infusion. Monitor for contraction alkalosis. Will need to consider CVVHDF if renal failure continues to worsen. Hold Telmisartan. Monitor labs and urine output. Avoid NSAIDs, ACEI/ARBS, RCA and nephrotoxins. Dose medications as per eGFR.        Metabolic acidosis with concomitant respiratory acidosis-   Pt initially with anion gap metabolic and lactic acidosis in the setting of likely DKA with LINDSEY. On admission, Serum CO2 low at 8 with lactate 11.7 and pH: 7.04. Pt received IV sodium bicarbonate in  the ED. Repeat ABG with pH improved to 7.27, Serum CO2: 12 and lactate 12.4. Pt being managed for DKA by primary team on insulin gtt. s/p IV sodium bicarbonate infusion. Now with Respiratory alkalosis likely from over ventilation & metabolic alkalosis from diuresis. Lactic acid improved to 5.3; worsening LFTs. Would continue to monitor pH and serum CO2 levels for now.

## 2021-06-21 NOTE — PROGRESS NOTE ADULT - SUBJECTIVE AND OBJECTIVE BOX
[facility] Division of Kidney Diseases & Hypertension  FOLLOW UP NOTE  [phone]--------------------------------------------------------------------------------  Chief Complaint:Type 2 diabetes mellitus with ketoacidosis without coma    HPI: 85 year old male with PMHx HTN, DM  was brought in by EMS for lethargic and respiratory failure. Per ED note and collateral info from son, patient not feeling well since yesterday and was given 0.4 nitro by son. Pt presented to the ED and was reportedly hypoxic to 60s and lethargic. Mental status waxing and waning, patient was placed on BIPAP then subsequently intubated for airway protection.On admission, labs notable for metabolic acidosis, elevated lactate (pH: 7.02, serum CO2 low at 8 with lactate 11.7) and hyperglycemia. Pt received Vancomycin/Zosyn, 2L IVF, 1 amp sodium bicarbonate and started on Insulin drip. Nephrology team consulted for LINDSEY and acidosis. No previous labs for review on [facility]. On admission, pt with elevated SCr of 2.09 (6/10). Pt seen and examined in ICU. Pt intubated with FIO2: 50% PEEP+8) and sedated. BP stable without any IV vasopressor support.  CXR with bilateral pleural effusions. Review of meds showed pt on Atenolol, Telmisartan, Isosorbide and Colesevelam. Pt on insulin drip for possible DKA & Iv bumex for volume overload      Pt seen & examined. Remains intubated & sedated. FiO2 30% with PEEP 5.           PAST HISTORY  --------------------------------------------------------------------------------  No significant changes to PMH, PSH, FHx, SHx, unless otherwise noted    ALLERGIES & MEDICATIONS  --------------------------------------------------------------------------------  Allergies    dyes,iv dyes (Unknown)  iodinated radiocontrast agents (Hives)    Intolerances      Standing Inpatient Medications  aspirin  chewable 81 milliGRAM(s) Oral daily  buMETAnide Infusion 4 mG/Hr IV Continuous <Continuous>  chlorhexidine 0.12% Liquid 15 milliLiter(s) Oral Mucosa every 12 hours  clopidogrel Tablet 75 milliGRAM(s) Oral daily  dextrose 5% + lactated ringers. 1000 milliLiter(s) IV Continuous <Continuous>  doxycycline IVPB 100 milliGRAM(s) IV Intermittent every 12 hours  heparin  Infusion.  Unit(s)/Hr IV Continuous <Continuous>  insulin regular Infusion 4 Unit(s)/Hr IV Continuous <Continuous>  meropenem  IVPB 500 milliGRAM(s) IV Intermittent every 12 hours  propofol Infusion 10 MICROgram(s)/kG/Min IV Continuous <Continuous>    PRN Inpatient Medications      REVIEW OF SYSTEMS  --------------------------------------------------------------------------------  Unobtainable to obtain    VITALS/PHYSICAL EXAM  --------------------------------------------------------------------------------  T(C): 37.8 (06-21-21 @ 08:00), Max: 37.8 (06-21-21 @ 08:00)  HR: 81 (06-21-21 @ 09:20) (71 - 133)  BP: 138/86 (06-21-21 @ 07:30) (99/70 - 198/140)  RR: 18 (06-21-21 @ 09:20) (18 - 36)  SpO2: 99% (06-21-21 @ 09:20) (90% - 100%)  Wt(kg): --    Weight (kg): 56.7 (06-20-21 @ 16:02)      06-20-21 @ 07:01  -  06-21-21 @ 07:00  --------------------------------------------------------  IN: 343.1 mL / OUT: 500 mL / NET: -156.9 mL    06-21-21 @ 07:01  -  06-21-21 @ 10:15  --------------------------------------------------------  IN: 75 mL / OUT: 185 mL / NET: -110 mL      Physical Exam:  	Gen: Intubated and sedated  	HEENT: +vent  	Pulm: mechanical breath sounds, fair air entry, decreased  	CV:  S1S2  	Abd: +BS, soft   	Ext: +bilateral LE edema, IO line on right leg  	Neuro: sedated  	Skin: Warm, without rashes  	Vascular access:     LABS/STUDIES  --------------------------------------------------------------------------------              11.9   14.58 >-----------<  125      [06-21-21 @ 05:29]              36.2     x   |  x   |  x   ----------------------------<  x       [06-21-21 @ 05:30]  x    |  x   |  2.34        Ca     9.1     [06-21-21 @ 05:29]      Mg     2.4     [06-21-21 @ 05:29]      Phos  0.5     [06-21-21 @ 05:29]    TPro  5.7  /  Alb  2.9  /  TBili  1.0  /  DBili  x   /  AST  3758  /  ALT  1675  /  AlkPhos  89  [06-21-21 @ 05:29]    PT/INR: PT 19.3 , INR 1.74       [06-20-21 @ 22:21]  PTT: 96.2       [06-21-21 @ 05:29]    CK 1437      [06-20-21 @ 23:38]    Creatinine Trend:  SCr 2.34 [06-21 @ 05:30]  SCr 2.34 [06-21 @ 05:29]  SCr 2.08 [06-20 @ 23:38]  SCr 2.08 [06-20 @ 19:51]  SCr 1.58 [06-20 @ 19:00]    Urinalysis - [06-20-21 @ 16:48]      Color Yellow / Appearance Clear / SG 1.032 / pH 6.5      Gluc >= 1000 mg/dL / Ketone Trace  / Bili Negative / Urobili <2 mg/dL       Blood Moderate / Protein >600 mg/dL / Leuk Est Negative / Nitrite Negative      RBC 52 / WBC 9 / Hyaline 1 / Gran  / Sq Epi  / Non Sq Epi 8 / Bacteria Few

## 2021-06-21 NOTE — PROCEDURE NOTE - NSPOSTPRCRAD_GEN_A_CORE
feeding tube in correct gastric position/post-procedure radiography performed
central line located in the superior vena cava/no pneumothorax/post-procedure radiography performed

## 2021-06-21 NOTE — CHART NOTE - NSCHARTNOTEFT_GEN_A_CORE
: Elfego Angulo    INDICATION: Decreased MAP; increasing troponemia/CK/CKMB with MALAIKA on EKG    PROCEDURE:  [x] LIMITED ECHO    FINDINGS:  Heart:   Severely decreased LV function  LVOT VTI: 7.3cm  LVOT diameter: 2.1cm  PV AT: 68ms  Diastolic dysfunction  E: 32  A: 39  E/A: 0.81  E': 2  E/E': 20.56  IVC: 1.8cm  systolic reversal of flow in IVC    CO: 1.9L/min  SV: 25cc  DO2: 345cc/min (6.46cc/kg/min)    INTERPRETATION:  Severely Decreased LV systolic function  Diastolic dysfunction with interval increased in LAP  Systolic reversal of flow in IVC  Severely impaired DO2    Images stored on [employer]. : Elfego Angulo    INDICATION: Decreased MAP; increasing troponinemia/CK/CKMB with MALAIKA on EKG    PROCEDURE:  [x] LIMITED ECHO    FINDINGS:  Heart:   Severely decreased LV function  LVOT VTI: 7.3cm  LVOT diameter: 2.1cm  PV AT: 68ms  Diastolic dysfunction  E: 32  A: 39  E/A: 0.81  E': 2  E/E': 20.56  IVC: 1.8cm  systolic reversal of flow in IVC    CO: 1.9L/min  SV: 25cc  DO2: 345cc/min (6.46cc/kg/min)    INTERPRETATION:  Severely Decreased LV systolic function  Diastolic dysfunction with interval increased in LAP  Systolic reversal of flow in IVC  Severely impaired DO2    Images stored on Q-path.    Attending Note: Agree with above

## 2021-06-21 NOTE — PROGRESS NOTE ADULT - SUBJECTIVE AND OBJECTIVE BOX
Kai Vidales, [facility]  Internal Medicine  [facility] [address]  [phone]    Patient is a 85y old  Male who presents with a chief complaint of AMS, Hypoxia (2021 21:45)      SUBJECTIVE / OVERNIGHT EVENTS:    MEDICATIONS  (STANDING):  aspirin  chewable 81 milliGRAM(s) Oral daily  buMETAnide Infusion 4 mG/Hr (20 mL/Hr) IV Continuous <Continuous>  chlorhexidine 0.12% Liquid 15 milliLiter(s) Oral Mucosa every 12 hours  clopidogrel Tablet 75 milliGRAM(s) Oral daily  doxycycline IVPB 100 milliGRAM(s) IV Intermittent every 12 hours  heparin  Infusion.  Unit(s)/Hr (7 mL/Hr) IV Continuous <Continuous>  insulin regular Infusion 4 Unit(s)/Hr (4 mL/Hr) IV Continuous <Continuous>  meropenem  IVPB 500 milliGRAM(s) IV Intermittent every 12 hours  propofol Infusion 10 MICROgram(s)/kG/Min (3.4 mL/Hr) IV Continuous <Continuous>  vancomycin  IVPB 1000 milliGRAM(s) IV Intermittent every 12 hours    MEDICATIONS  (PRN):      PHYSICAL EXAM:  Vital Signs Last 24 Hrs  T(C): 36 (2021 04:00), Max: 36 (2021 00:00)  T(F): 96.8 (2021 04:00), Max: 96.8 (2021 00:00)  HR: 84 (2021 07:40) (71 - 133)  BP: 138/86 (2021 07:30) (99/70 - 198/140)  BP(mean): 97 (2021 07:30) (77 - 128)  RR: 18 (2021 07:40) (18 - 36)  SpO2: 98% (2021 07:40) (90% - 100%)    CONSTITUTIONAL: NAD, well-developed  EYES: conjunctiva and sclera clear  ENMT: Moist oral mucosa  NECK: Supple, no palpable masses  RESPIRATORY: Normal WOB; lungs are CTA b/l  CARDIOVASCULAR: RRR; S1/S2 present; no m/r/g; No LE edema; Peripheral pulses are 2+ bilaterally  ABDOMEN: Soft, nontender, normoactive BS, no rebound/guarding; No hepatosplenomegaly  MUSCULOSKELETAL:  moving all extremities  NEUROLOGY: awake, A&O to person, place, and time  SKIN: No rashes  ----  I&O's Summary    2021 07:01  -  2021 07:00  --------------------------------------------------------  IN: 343.1 mL / OUT: 500 mL / NET: -156.9 mL    2021 07:01  -  2021 07:56  --------------------------------------------------------  IN: 39.5 mL / OUT: 60 mL / NET: -20.5 mL      ----  LABS:                        11.9   14.58 )-----------( 125      ( 2021 05:29 )             36.2     ----      x   |  x   |  x   ----------------------------<  x   x    |  x   |  2.34<H>    Ca    9.1      2021 05:29  Phos  0.5     -  Mg     2.4     -    TPro  5.7<L>  /  Alb  2.9<L>  /  TBili  1.0  /  DBili  x   /  AST  3758<H>  /  ALT  1675<H>  /  AlkPhos  89  06-21    ----  PT/INR - ( 2021 22:21 )   PT: 19.3 sec;   INR: 1.74 ratio         PTT - ( 2021 05:29 )  PTT:96.2 sec  ----  CARDIAC MARKERS ( 2021 05:30 )  x     / x     / x     / x     / 97.4 ng/mL  CARDIAC MARKERS ( 2021 23:38 )  x     / x     / 1437 U/L / x     / 80.3 ng/mL  CARDIAC MARKERS ( 2021 19:52 )  x     / x     / 426 U/L / x     / 18.8 ng/mL      ----  Urinalysis Basic - ( 2021 16:48 )    Color: Yellow / Appearance: Clear / S.032 / pH: x  Gluc: x / Ketone: Trace  / Bili: Negative / Urobili: <2 mg/dL   Blood: x / Protein: >600 mg/dL / Nitrite: Negative   Leuk Esterase: Negative / RBC: 52 /HPF / WBC 9 /HPF   Sq Epi: x / Non Sq Epi: 8 /HPF / Bacteria: Few      ----      RADIOLOGY & ADDITIONAL TESTS:  Results Reviewed:   Imaging Personally Reviewed:  Electrocardiogram Personally Reviewed: Kai Vidales, [facility]  Internal Medicine  [facility] [address]  [phone]    Patient is a 85y old  Male who presents with a chief complaint of AMS, Hypoxia (2021 21:45)      SUBJECTIVE / OVERNIGHT EVENTS:  - overnight, hypothermic 94.6 to 96.8 rectally; MAP 80+ off pressors  - started on bumex gtt at 2300 w/improvement in UOP from 0-25cc/h to 50-60cc/h    MEDICATIONS  (STANDING):  aspirin  chewable 81 milliGRAM(s) Oral daily  buMETAnide Infusion 4 mG/Hr (20 mL/Hr) IV Continuous <Continuous>  chlorhexidine 0.12% Liquid 15 milliLiter(s) Oral Mucosa every 12 hours  clopidogrel Tablet 75 milliGRAM(s) Oral daily  doxycycline IVPB 100 milliGRAM(s) IV Intermittent every 12 hours  heparin  Infusion.  Unit(s)/Hr (7 mL/Hr) IV Continuous <Continuous>  insulin regular Infusion 4 Unit(s)/Hr (4 mL/Hr) IV Continuous <Continuous>  meropenem  IVPB 500 milliGRAM(s) IV Intermittent every 12 hours  propofol Infusion 10 MICROgram(s)/kG/Min (3.4 mL/Hr) IV Continuous <Continuous>  vancomycin  IVPB 1000 milliGRAM(s) IV Intermittent every 12 hours    MEDICATIONS  (PRN):      PHYSICAL EXAM:  Vital Signs Last 24 Hrs  T(C): 36 (2021 04:00), Max: 36 (2021 00:00)  T(F): 96.8 (2021 04:00), Max: 96.8 (2021 00:00)  HR: 84 (2021 07:40) (71 - 133)  BP: 138/86 (2021 07:30) (99/70 - 198/140)  BP(mean): 97 (2021 07:30) (77 - 128)  RR: 18 (2021 07:40) (18 - 36)  SpO2: 98% (2021 07:40) (90% - 100%)    Constitutional: NAD, awake and alert  EYES: EOMI  Neck: Soft and supple , no thyromegaly   Respiratory: Breath sounds are clear bilaterally, No wheezing, rales or rhonchi  Cardiovascular: S1 and S2, regular rate and rhythm, no Murmurs, gallops or rubs, no JVD,    Gastrointestinal: Bowel Sounds present, soft, nontender, nondistended, no guarding, no rebound  Extremities: No cyanosis or clubbing; warm to touch  Vascular: 2+ peripheral pulses lower ex  Neurological: sedated; Pupils are equally reactive to light and symmetrical in size  Skin: No rashes    ----  I&O's Summary    2021 07:01  -  2021 07:00  --------------------------------------------------------  IN: 343.1 mL / OUT: 500 mL / NET: -156.9 mL    2021 07:01  -  2021 07:56  --------------------------------------------------------  IN: 39.5 mL / OUT: 60 mL / NET: -20.5 mL      ----  LABS:                        11.9   14.58 )-----------( 125      ( 2021 05:29 )             36.2     ----      x   |  x   |  x   ----------------------------<  x   x    |  x   |  2.34<H>    Ca    9.1      2021 05:29  Phos  0.5       Mg     2.4         TPro  5.7<L>  /  Alb  2.9<L>  /  TBili  1.0  /  DBili  x   /  AST  3758<H>  /  ALT  1675<H>  /  AlkPhos  89  0621    ----  PT/INR - ( 2021 22:21 )   PT: 19.3 sec;   INR: 1.74 ratio         PTT - ( 2021 05:29 )  PTT:96.2 sec  ----  CARDIAC MARKERS ( 2021 05:30 )  x     / x     / x     / x     / 97.4 ng/mL  CARDIAC MARKERS ( 2021 23:38 )  x     / x     / 1437 U/L / x     / 80.3 ng/mL  CARDIAC MARKERS ( 2021 19:52 )  x     / x     / 426 U/L / x     / 18.8 ng/mL      ----  Urinalysis Basic - ( 2021 16:48 )    Color: Yellow / Appearance: Clear / S.032 / pH: x  Gluc: x / Ketone: Trace  / Bili: Negative / Urobili: <2 mg/dL   Blood: x / Protein: >600 mg/dL / Nitrite: Negative   Leuk Esterase: Negative / RBC: 52 /HPF / WBC 9 /HPF   Sq Epi: x / Non Sq Epi: 8 /HPF / Bacteria: Few      ----

## 2021-06-21 NOTE — CHART NOTE - NSCHARTNOTEFT_GEN_A_CORE
Notified of EKG changes Now with MALAIKA V1, V2, and troponin increase 740 -> 9554. CKMB index 5.6. Case discussed with Cardiology Interventionalist. No urgent cath. Pt remains hemodynamically stable and off pressors. Pt intubated and sedated.  Plan  Continue asa, plavix and heparin per ACS protocol  Continue to trend cardiac enzymes  serial EKG  Cardiology #[id number]

## 2021-06-21 NOTE — PROGRESS NOTE ADULT - ASSESSMENT
85 year old male with hx of diabetes who comes in for AMS, found to be hypoxic and in DKA with additional acute renal failure, intubated and sedated in the ED due to worsening mental status and inability to protect airway.       #Neuro   -currently intubated and sedated   - no active mental status with low dose propofol --> concerning, will continue to follow       #Pulm   -intubated   -CXR reviewed with bilateral loculated pleural effusions --> confirmed on POCUS  -metabolic acidosis with concomitant respiratory acidosis (corrected PCO2 should be 18-22 mmHg)  -Ventilator settings: 450/28/5/60    #Cardiac:   - Severely decreased LV function on POCUS with increasing troponins, STD seen in lateral leads --> unclear if this is patient's baseline or not, no EKG to compare. Will c/s cardiology.   -hypertensive emergency possibly with acute renal failure   -control BP       #GI;   -NPO b/c intubated  -will need NGT and likely start tube feeds   -monitor stool count     #Renal:   -BUN:Cr < 10   -component of acute renal failure likely 2/2 ATN maybe 2/2 hypertensive emergency   -abbott catheter to monitor I/O, avoid nephrotoxins   -HAGMA 2/2 lactic acidosis and component of DKA   -delta-delta gap 1.44; no concurrent non anion gap acidosis or metabolic alkalosis   -s/p Bicarb push and gtt in ED, will d/c bicarb gtt   -fluid resuscitation for lactic acid clearance   -will need central line access for possible HD if worsening acidosis   -repeat ABGs   -renal consult    #ID:   -leukocytosis to 11, tachycardic and tachypneic, source of infection ?   -BCx pending (6/20), started empirically on vancomycin/zosyn (6/20/21), will consider taking one off due to renal function   -MRSA swab, COVID pending     #Heme/Onc:   -DVT ppx with subq heparin   -monitor leukocytosis   -no thrombocytopenia or anemia  85 year old male with hx of diabetes who comes in for AMS, found to be hypoxic and in DKA with additional oliguric renal failure, intubated and sedated 6/20 in the ED due to worsening mental status and inability to protect airway and now with STEMI on medical management    Neuro  #AMS, likely metabolic encephalopathy iso hypercapnia and sepsis  - currently sedated on prop    Pulm   #hypercapnic respiratory failure and c/f airway protection s/p intubation 6/20/21  -CXR reviewed with bilateral loculated pleural effusions --> confirmed on POCUS, iso oliguric renal failure indicative of volume overload (BNP 50k)  -ABG this AM showing over-correction of initial respiratory acidosis; now w/ ABG: pH 7.64/16/112/24; RR decreased from 28 to 18  -monitor blood gas and adjust vent settings PRN    Cardiac:   #STEMI in leads V1/V2 w/rising troponin and CKMB  -s/p ASA/plavix load; c/w ASA, Plavix, heparin gtt  -c/f ADHF given pleural effusions and BNP 50k though w/minimal O2 requirements on ventilator (PEEP 8/FiO2 30%); hold BB and c/w bumex gtt as below  -s/p hydral 2.5mg IVP for BP control; will d/w cards re: additional options      GI  #LFT elevation, likely shock liver given acute rise in LFTs from WNL to >3000/1600 vs. less likely congestive hepatopathy  - INR 1.9, platelets 125  - continue to trend to clearance  - OG tube in place  -monitor stool count     Renal:   -BUN:Cr < 10   -component of acute renal failure likely 2/2 ATN maybe 2/2 hypertensive emergency   -abbott catheter to monitor I/O, avoid nephrotoxins   -HAGMA 2/2 lactic acidosis and component of DKA   -delta-delta gap 1.44; no concurrent non anion gap acidosis or metabolic alkalosis   -s/p Bicarb push and gtt in ED, will d/c bicarb gtt   -fluid resuscitation for lactic acid clearance   -will need central line access for possible HD if worsening acidosis   -repeat ABGs   -renal consult    Endo  #DKA    ID:   -leukocytosis to 11, tachycardic and tachypneic, source of infection ?   -BCx pending (6/20), started empirically on vancomycin/zosyn (6/20/21), will consider taking one off due to renal function   -MRSA swab, COVID pending     Heme/Onc:   -DVT ppx with subq heparin   -monitor leukocytosis   -no thrombocytopenia or anemia  85 year old male with hx of diabetes who comes in for AMS, found to be hypoxic and in DKA with additional oliguric renal failure, intubated and sedated 6/20 in the ED due to worsening mental status and inability to protect airway and now with STEMI on medical management    Neuro  #AMS, likely metabolic encephalopathy iso hypercapnia and sepsis  - currently sedated on prop    Pulm   #hypercapnic respiratory failure and c/f airway protection s/p intubation 6/20/21  -CXR reviewed with bilateral loculated pleural effusions --> confirmed on POCUS, iso oliguric renal failure indicative of volume overload (BNP 50k)  -ABG this AM showing over-correction of initial respiratory acidosis; now w/ ABG: pH 7.64/16/112/24; RR decreased from 28 to 18  -monitor blood gas and adjust vent settings PRN    Cardiac:   #STEMI in leads V1/V2 w/rising troponin and CKMB  -s/p ASA/plavix load; c/w ASA, Plavix, heparin gtt  -c/f ADHF given pleural effusions and BNP 50k though w/minimal O2 requirements on ventilator (PEEP 8/FiO2 30%); hold BB and c/w bumex gtt as below  -s/p hydral 2.5mg IVP for BP control; will d/w cards re: additional options      GI  #LFT elevation, likely shock liver given acute rise in LFTs from WNL to >3000/1600 vs. less likely congestive hepatopathy  - INR 1.9, platelets 125  - continue to trend to clearance  - OG tube in place; hold TF iso DKA    Renal:   #LINDSEY on CKD  -BUN:Cr < 10   -component of acute renal failure likely 2/2 ATN maybe 2/2 hypertensive emergency   -abbott catheter to monitor I/O, avoid nephrotoxins   -HAGMA 2/2 lactic acidosis and component of DKA   -delta-delta gap 1.44; no concurrent non anion gap acidosis or metabolic alkalosis   -s/p Bicarb push and gtt in ED, will d/c bicarb gtt   -fluid resuscitation for lactic acid clearance   -will need central line access for possible HD if worsening acidosis   -repeat ABGs   -renal consult    Endo  #DKA    ID:   -leukocytosis to 11, tachycardic and tachypneic, source of infection ?   -BCx pending (6/20), started empirically on vancomycin/zosyn (6/20/21), will consider taking one off due to renal function   -MRSA swab, COVID pending     Heme/Onc:   -DVT ppx with subq heparin   -monitor leukocytosis   -no thrombocytopenia or anemia  85 year old male with hx of diabetes who comes in for AMS, found to be hypoxic and in DKA with additional oliguric renal failure, intubated and sedated 6/20 in the ED due to worsening mental status and inability to protect airway and now with STEMI on medical management    Neuro  #AMS, likely metabolic encephalopathy iso hypercapnia and sepsis  - currently sedated on prop    Pulm   #hypercapnic respiratory failure and c/f airway protection s/p intubation 6/20/21  -CXR reviewed with bilateral loculated pleural effusions --> confirmed on POCUS, iso oliguric renal failure indicative of volume overload (BNP 50k)  -ABG this AM showing over-correction of initial respiratory acidosis; now w/ ABG: pH 7.64/16/112/24; RR decreased from 28 to 18  -monitor blood gas and adjust vent settings PRN    Cardiac:   #STEMI in leads V1/V2 w/rising troponin and CKMB  -s/p ASA/plavix load; c/w ASA, Plavix, heparin gtt  -c/f ADHF given pleural effusions and BNP 50k though w/minimal O2 requirements on ventilator (PEEP 8/FiO2 30%); hold BB and c/w bumex gtt as below  -s/p hydral 2.5mg IVP for BP control; will d/w cards re: additional options      GI  #LFT elevation, likely shock liver given acute rise in LFTs from WNL to 3000/1600 iso hypoperfusion from MI vs. less likely congestive hepatopathy  - INR 1.9, platelets 125  - continue to trend to clearance  - OG tube in place; hold TF iso DKA    Renal:   #LINDSEY (no previous labs to assess baseline) c/b oliguric renal failure, likely ATN 2/2 hypoperfusion iso MI vs. hypovolemia prior to MI  - HAGMA 2/2 lactic acidosis and component of DKA w/additional respiratory acidosis  - bicarb gtt d/c'ed in unit  - bumex IVP and gtt started overnight w/improvement in UOP from 0-25cc/h to 50-60cc/h; AGMA and bicarb (off bicarb gtt) improving  - continue monitoring UOP on bumex gtt; trend Cr    Endo  #DKA w/FS >600, metabolic acidosis, HAGMA, BHB 0.9  - AG improving on insulin gtt  - continue D5LR w/KCl supplement until resolution of DKA    ID:  SIRS w/hypothermia, tachycardia, leukocytosis, lactate w/o source of infection  - RLE w/crusted lesions without associated erythema, warmth, or exudates  -BCx pending (6/20), started empirically on vancomycin/zosyn (6/20/21), will consider taking one off due to renal function   -MRSA swab, COVID pending     Heme/Onc:   -DVT ppx with subq heparin   -monitor leukocytosis   -no thrombocytopenia or anemia  85 year old male with hx of diabetes who comes in for AMS, found to be hypoxic and in DKA with additional oliguric renal failure, intubated and sedated 6/20 in the ED due to worsening mental status and inability to protect airway and now with STEMI on medical management    Neuro  #AMS, likely metabolic encephalopathy iso hypercapnia and sepsis  - currently sedated on prop    Pulm   #hypercapnic respiratory failure and c/f airway protection s/p intubation 6/20/21  -CXR reviewed with bilateral loculated pleural effusions --> confirmed on POCUS, iso oliguric renal failure indicative of volume overload (BNP 50k)  -ABG this AM showing over-correction of initial respiratory acidosis; now w/ ABG: pH 7.64/16/112/24; RR decreased from 28 to 18  -monitor blood gas and adjust vent settings PRN    Cardiac:   #STEMI in leads V1/V2 w/rising troponin and CKMB  -s/p ASA/plavix load; c/w ASA, Plavix, heparin gtt  -c/f ADHF given pleural effusions and BNP 50k though w/minimal O2 requirements on ventilator (PEEP 8/FiO2 30%); hold BB and c/w bumex gtt as below  -s/p hydral 2.5mg IVP for BP control; will d/w cards re: additional options      GI  #LFT elevation, likely shock liver given acute rise in LFTs from WNL to 3000/1600 iso hypoperfusion from MI vs. less likely congestive hepatopathy  - INR 1.9, platelets 125  - continue to trend to clearance  - OG tube in place; hold TF iso DKA    #r/o C. diff given multiple episodes of diarrhea prior to arrival  - send C. diff w/next episode of diarrhea    Renal:   #LINDSEY (no previous labs to assess baseline) c/b oliguric renal failure, likely ATN 2/2 hypoperfusion iso MI vs. hypovolemia prior to MI  - HAGMA 2/2 lactic acidosis and component of DKA w/additional respiratory acidosis  - bicarb gtt d/c'ed in unit  - bumex IVP and gtt started overnight w/improvement in UOP from 0-25cc/h to 50-60cc/h; AGMA and bicarb (off bicarb gtt) improving  - continue monitoring UOP on bumex gtt; trend Cr    Endo  #DKA w/FS >600, metabolic acidosis, HAGMA, BHB 0.9  - AG improving on insulin gtt  - continue D5LR w/KCl supplement until resolution of DKA    ID:  SIRS w/hypothermia, tachycardia, leukocytosis, lactate w/o obvious source of infection  - RLE w/crusted lesions without associated erythema, warmth, or exudates  - BCx pending (6/20), started empirically on vancomycin/zosyn/doxy (6/20/21)  - MRSA swab, COVID pending     Heme/Onc:   -DVT ppx: on heparin gtt

## 2021-06-21 NOTE — PROGRESS NOTE ADULT - SUBJECTIVE AND OBJECTIVE BOX
[facility] Division of Kidney Diseases & Hypertension  FOLLOW UP NOTE  [phone]--------------------------------------------------------------------------------  Chief Complaint:Type 2 diabetes mellitus with ketoacidosis without coma    HPI: 85 year old male with PMHx HTN, DM  was brought in by EMS for lethargic and respiratory failure. Per ED note and collateral info from son, patient not feeling well since yesterday and was given 0.4 nitro by son. Pt presented to the ED and was reportedly hypoxic to 60s and lethargic. Mental status waxing and waning, patient was placed on BIPAP then subsequently intubated for airway protection.On admission, labs notable for metabolic acidosis, elevated lactate (pH: 7.02, serum CO2 low at 8 with lactate 11.7) and hyperglycemia. Pt received Vancomycin/Zosyn, 2L IVF, 1 amp sodium bicarbonate and started on Insulin drip. Nephrology team consulted for LINDSEY and acidosis. No previous labs for review on [facility]. On admission, pt with elevated SCr of 2.09 (6/10). Pt seen and examined in ICU. Pt intubated with FIO2: 50% PEEP+8) and sedated. BP stable without any IV vasopressor support.  CXR with bilateral pleural effusions. Review of meds showed pt on Atenolol, Telmisartan, Isosorbide and Colesevelam. Pt on insulin drip for possible DKA & Iv bumex for volume overload      Pt seen & examined. Remains intubated & sedated. FiO2 30% with PEEP 5.           PAST HISTORY  --------------------------------------------------------------------------------  No significant changes to PMH, PSH, FHx, SHx, unless otherwise noted    ALLERGIES & MEDICATIONS  --------------------------------------------------------------------------------  Allergies    dyes,iv dyes (Unknown)  iodinated radiocontrast agents (Hives)    Intolerances      Standing Inpatient Medications  aspirin  chewable 81 milliGRAM(s) Oral daily  buMETAnide Infusion 4 mG/Hr IV Continuous <Continuous>  chlorhexidine 0.12% Liquid 15 milliLiter(s) Oral Mucosa every 12 hours  clopidogrel Tablet 75 milliGRAM(s) Oral daily  dextrose 5% + lactated ringers. 1000 milliLiter(s) IV Continuous <Continuous>  doxycycline IVPB 100 milliGRAM(s) IV Intermittent every 12 hours  heparin  Infusion.  Unit(s)/Hr IV Continuous <Continuous>  insulin regular Infusion 4 Unit(s)/Hr IV Continuous <Continuous>  meropenem  IVPB 500 milliGRAM(s) IV Intermittent every 12 hours  propofol Infusion 10 MICROgram(s)/kG/Min IV Continuous <Continuous>    PRN Inpatient Medications      REVIEW OF SYSTEMS  --------------------------------------------------------------------------------  Unobtainable to obtain    VITALS/PHYSICAL EXAM  --------------------------------------------------------------------------------  T(C): 37.8 (06-21-21 @ 08:00), Max: 37.8 (06-21-21 @ 08:00)  HR: 81 (06-21-21 @ 09:20) (71 - 133)  BP: 138/86 (06-21-21 @ 07:30) (99/70 - 198/140)  RR: 18 (06-21-21 @ 09:20) (18 - 36)  SpO2: 99% (06-21-21 @ 09:20) (90% - 100%)  Wt(kg): --    Weight (kg): 56.7 (06-20-21 @ 16:02)      06-20-21 @ 07:01  -  06-21-21 @ 07:00  --------------------------------------------------------  IN: 343.1 mL / OUT: 500 mL / NET: -156.9 mL    06-21-21 @ 07:01  -  06-21-21 @ 10:15  --------------------------------------------------------  IN: 75 mL / OUT: 185 mL / NET: -110 mL      Physical Exam:  	Gen: Intubated and sedated  	HEENT: +vent  	Pulm: mechanical breath sounds, fair air entry, decreased  	CV:  S1S2  	Abd: +BS, soft   	Ext: +bilateral LE edema, IO line on right leg  	Neuro: sedated  	Skin: Warm, without rashes  	Vascular access:     LABS/STUDIES  --------------------------------------------------------------------------------              11.9   14.58 >-----------<  125      [06-21-21 @ 05:29]              36.2     x   |  x   |  x   ----------------------------<  x       [06-21-21 @ 05:30]  x    |  x   |  2.34        Ca     9.1     [06-21-21 @ 05:29]      Mg     2.4     [06-21-21 @ 05:29]      Phos  0.5     [06-21-21 @ 05:29]    TPro  5.7  /  Alb  2.9  /  TBili  1.0  /  DBili  x   /  AST  3758  /  ALT  1675  /  AlkPhos  89  [06-21-21 @ 05:29]    PT/INR: PT 19.3 , INR 1.74       [06-20-21 @ 22:21]  PTT: 96.2       [06-21-21 @ 05:29]    CK 1437      [06-20-21 @ 23:38]    Creatinine Trend:  SCr 2.34 [06-21 @ 05:30]  SCr 2.34 [06-21 @ 05:29]  SCr 2.08 [06-20 @ 23:38]  SCr 2.08 [06-20 @ 19:51]  SCr 1.58 [06-20 @ 19:00]    Urinalysis - [06-20-21 @ 16:48]      Color Yellow / Appearance Clear / SG 1.032 / pH 6.5      Gluc >= 1000 mg/dL / Ketone Trace  / Bili Negative / Urobili <2 mg/dL       Blood Moderate / Protein >600 mg/dL / Leuk Est Negative / Nitrite Negative      RBC 52 / WBC 9 / Hyaline 1 / Gran  / Sq Epi  / Non Sq Epi 8 / Bacteria Few

## 2021-06-22 LAB
ANION GAP SERPL CALC-SCNC: 12 MMOL/L — SIGNIFICANT CHANGE UP (ref 7–14)
ANION GAP SERPL CALC-SCNC: 15 MMOL/L — HIGH (ref 7–14)
ANION GAP SERPL CALC-SCNC: 16 MMOL/L — HIGH (ref 7–14)
APTT BLD: 53.6 SEC — HIGH (ref 27–36.3)
APTT BLD: 58.7 SEC — HIGH (ref 27–36.3)
B-OH-BUTYR SERPL-SCNC: 0.2 MMOL/L — SIGNIFICANT CHANGE UP (ref 0–0.4)
BLOOD GAS ARTERIAL - LYTES,HGB,ICA,LACT RESULT: SIGNIFICANT CHANGE UP
BLOOD GAS ARTERIAL - LYTES,HGB,ICA,LACT RESULT: SIGNIFICANT CHANGE UP
BUN SERPL-MCNC: 26 MG/DL — HIGH (ref 7–23)
BUN SERPL-MCNC: 27 MG/DL — HIGH (ref 7–23)
BUN SERPL-MCNC: 28 MG/DL — HIGH (ref 7–23)
CALCIUM SERPL-MCNC: 8.6 MG/DL — SIGNIFICANT CHANGE UP (ref 8.4–10.5)
CALCIUM SERPL-MCNC: 8.8 MG/DL — SIGNIFICANT CHANGE UP (ref 8.4–10.5)
CALCIUM SERPL-MCNC: 9.2 MG/DL — SIGNIFICANT CHANGE UP (ref 8.4–10.5)
CHLORIDE SERPL-SCNC: 101 MMOL/L — SIGNIFICANT CHANGE UP (ref 98–107)
CHLORIDE SERPL-SCNC: 103 MMOL/L — SIGNIFICANT CHANGE UP (ref 98–107)
CHLORIDE SERPL-SCNC: 105 MMOL/L — SIGNIFICANT CHANGE UP (ref 98–107)
CO2 SERPL-SCNC: 22 MMOL/L — SIGNIFICANT CHANGE UP (ref 22–31)
CO2 SERPL-SCNC: 23 MMOL/L — SIGNIFICANT CHANGE UP (ref 22–31)
CO2 SERPL-SCNC: 25 MMOL/L — SIGNIFICANT CHANGE UP (ref 22–31)
CREAT SERPL-MCNC: 2.75 MG/DL — HIGH (ref 0.5–1.3)
CREAT SERPL-MCNC: 2.77 MG/DL — HIGH (ref 0.5–1.3)
CREAT SERPL-MCNC: 2.78 MG/DL — HIGH (ref 0.5–1.3)
GAS PNL BLDA: SIGNIFICANT CHANGE UP
GLUCOSE BLDC GLUCOMTR-MCNC: 146 MG/DL — HIGH (ref 70–99)
GLUCOSE BLDC GLUCOMTR-MCNC: 161 MG/DL — HIGH (ref 70–99)
GLUCOSE BLDC GLUCOMTR-MCNC: 169 MG/DL — HIGH (ref 70–99)
GLUCOSE BLDC GLUCOMTR-MCNC: 179 MG/DL — HIGH (ref 70–99)
GLUCOSE BLDC GLUCOMTR-MCNC: 182 MG/DL — HIGH (ref 70–99)
GLUCOSE BLDC GLUCOMTR-MCNC: 183 MG/DL — HIGH (ref 70–99)
GLUCOSE BLDC GLUCOMTR-MCNC: 193 MG/DL — HIGH (ref 70–99)
GLUCOSE BLDC GLUCOMTR-MCNC: 197 MG/DL — HIGH (ref 70–99)
GLUCOSE BLDC GLUCOMTR-MCNC: 210 MG/DL — HIGH (ref 70–99)
GLUCOSE BLDC GLUCOMTR-MCNC: 211 MG/DL — HIGH (ref 70–99)
GLUCOSE BLDC GLUCOMTR-MCNC: 212 MG/DL — HIGH (ref 70–99)
GLUCOSE BLDC GLUCOMTR-MCNC: 215 MG/DL — HIGH (ref 70–99)
GLUCOSE BLDC GLUCOMTR-MCNC: 221 MG/DL — HIGH (ref 70–99)
GLUCOSE BLDC GLUCOMTR-MCNC: 222 MG/DL — HIGH (ref 70–99)
GLUCOSE BLDC GLUCOMTR-MCNC: 226 MG/DL — HIGH (ref 70–99)
GLUCOSE BLDC GLUCOMTR-MCNC: 229 MG/DL — HIGH (ref 70–99)
GLUCOSE BLDC GLUCOMTR-MCNC: 230 MG/DL — HIGH (ref 70–99)
GLUCOSE BLDC GLUCOMTR-MCNC: 241 MG/DL — HIGH (ref 70–99)
GLUCOSE SERPL-MCNC: 175 MG/DL — HIGH (ref 70–99)
GLUCOSE SERPL-MCNC: 238 MG/DL — HIGH (ref 70–99)
GLUCOSE SERPL-MCNC: 239 MG/DL — HIGH (ref 70–99)
GRAM STN FLD: SIGNIFICANT CHANGE UP
HCT VFR BLD CALC: 36.6 % — LOW (ref 39–50)
HGB BLD-MCNC: 12.1 G/DL — LOW (ref 13–17)
INR BLD: 1.59 RATIO — HIGH (ref 0.88–1.16)
MAGNESIUM SERPL-MCNC: 1.7 MG/DL — SIGNIFICANT CHANGE UP (ref 1.6–2.6)
MAGNESIUM SERPL-MCNC: 1.9 MG/DL — SIGNIFICANT CHANGE UP (ref 1.6–2.6)
MAGNESIUM SERPL-MCNC: 2 MG/DL — SIGNIFICANT CHANGE UP (ref 1.6–2.6)
MCHC RBC-ENTMCNC: 29.6 PG — SIGNIFICANT CHANGE UP (ref 27–34)
MCHC RBC-ENTMCNC: 33.1 GM/DL — SIGNIFICANT CHANGE UP (ref 32–36)
MCV RBC AUTO: 89.5 FL — SIGNIFICANT CHANGE UP (ref 80–100)
NRBC # BLD: 0 /100 WBCS — SIGNIFICANT CHANGE UP
NRBC # FLD: 0.03 K/UL — HIGH
PHOSPHATE SERPL-MCNC: 2.9 MG/DL — SIGNIFICANT CHANGE UP (ref 2.5–4.5)
PHOSPHATE SERPL-MCNC: 3.4 MG/DL — SIGNIFICANT CHANGE UP (ref 2.5–4.5)
PHOSPHATE SERPL-MCNC: 3.6 MG/DL — SIGNIFICANT CHANGE UP (ref 2.5–4.5)
PLATELET # BLD AUTO: 141 K/UL — LOW (ref 150–400)
POTASSIUM SERPL-MCNC: 3.4 MMOL/L — LOW (ref 3.5–5.3)
POTASSIUM SERPL-MCNC: 3.9 MMOL/L — SIGNIFICANT CHANGE UP (ref 3.5–5.3)
POTASSIUM SERPL-MCNC: 4.4 MMOL/L — SIGNIFICANT CHANGE UP (ref 3.5–5.3)
POTASSIUM SERPL-SCNC: 3.4 MMOL/L — LOW (ref 3.5–5.3)
POTASSIUM SERPL-SCNC: 3.9 MMOL/L — SIGNIFICANT CHANGE UP (ref 3.5–5.3)
POTASSIUM SERPL-SCNC: 4.4 MMOL/L — SIGNIFICANT CHANGE UP (ref 3.5–5.3)
PROTHROM AB SERPL-ACNC: 17.7 SEC — HIGH (ref 10.6–13.6)
RBC # BLD: 4.09 M/UL — LOW (ref 4.2–5.8)
RBC # FLD: 14 % — SIGNIFICANT CHANGE UP (ref 10.3–14.5)
SODIUM SERPL-SCNC: 139 MMOL/L — SIGNIFICANT CHANGE UP (ref 135–145)
SODIUM SERPL-SCNC: 141 MMOL/L — SIGNIFICANT CHANGE UP (ref 135–145)
SODIUM SERPL-SCNC: 142 MMOL/L — SIGNIFICANT CHANGE UP (ref 135–145)
SPECIMEN SOURCE: SIGNIFICANT CHANGE UP
TROPONIN T, HIGH SENSITIVITY RESULT: 5170 NG/L — CRITICAL HIGH
VANCOMYCIN FLD-MCNC: 9.1 UG/ML — SIGNIFICANT CHANGE UP
WBC # BLD: 18.33 K/UL — HIGH (ref 3.8–10.5)
WBC # FLD AUTO: 18.33 K/UL — HIGH (ref 3.8–10.5)

## 2021-06-22 PROCEDURE — 99233 SBSQ HOSP IP/OBS HIGH 50: CPT

## 2021-06-22 PROCEDURE — 99291 CRITICAL CARE FIRST HOUR: CPT

## 2021-06-22 RX ORDER — DEXTROSE 50 % IN WATER 50 %
25 SYRINGE (ML) INTRAVENOUS ONCE
Refills: 0 | Status: DISCONTINUED | OUTPATIENT
Start: 2021-06-22 | End: 2021-06-30

## 2021-06-22 RX ORDER — DEXMEDETOMIDINE HYDROCHLORIDE IN 0.9% SODIUM CHLORIDE 4 UG/ML
0.2 INJECTION INTRAVENOUS
Qty: 400 | Refills: 0 | Status: DISCONTINUED | OUTPATIENT
Start: 2021-06-22 | End: 2021-06-24

## 2021-06-22 RX ORDER — POTASSIUM CHLORIDE 20 MEQ
40 PACKET (EA) ORAL ONCE
Refills: 0 | Status: COMPLETED | OUTPATIENT
Start: 2021-06-22 | End: 2021-06-22

## 2021-06-22 RX ORDER — DEXTROSE 50 % IN WATER 50 %
15 SYRINGE (ML) INTRAVENOUS ONCE
Refills: 0 | Status: DISCONTINUED | OUTPATIENT
Start: 2021-06-22 | End: 2021-06-30

## 2021-06-22 RX ORDER — SODIUM CHLORIDE 9 MG/ML
1000 INJECTION, SOLUTION INTRAVENOUS
Refills: 0 | Status: DISCONTINUED | OUTPATIENT
Start: 2021-06-22 | End: 2021-06-30

## 2021-06-22 RX ORDER — INSULIN LISPRO 100/ML
VIAL (ML) SUBCUTANEOUS
Refills: 0 | Status: DISCONTINUED | OUTPATIENT
Start: 2021-06-22 | End: 2021-06-23

## 2021-06-22 RX ORDER — DEXTROSE 50 % IN WATER 50 %
12.5 SYRINGE (ML) INTRAVENOUS ONCE
Refills: 0 | Status: DISCONTINUED | OUTPATIENT
Start: 2021-06-22 | End: 2021-06-30

## 2021-06-22 RX ORDER — HUMAN INSULIN 100 [IU]/ML
11 INJECTION, SUSPENSION SUBCUTANEOUS EVERY 6 HOURS
Refills: 0 | Status: DISCONTINUED | OUTPATIENT
Start: 2021-06-22 | End: 2021-06-24

## 2021-06-22 RX ORDER — GLUCAGON INJECTION, SOLUTION 0.5 MG/.1ML
1 INJECTION, SOLUTION SUBCUTANEOUS ONCE
Refills: 0 | Status: DISCONTINUED | OUTPATIENT
Start: 2021-06-22 | End: 2021-06-30

## 2021-06-22 RX ADMIN — Medication 1 APPLICATION(S): at 06:25

## 2021-06-22 RX ADMIN — Medication 1: at 23:29

## 2021-06-22 RX ADMIN — DEXMEDETOMIDINE HYDROCHLORIDE IN 0.9% SODIUM CHLORIDE 2.84 MICROGRAM(S)/KG/HR: 4 INJECTION INTRAVENOUS at 14:33

## 2021-06-22 RX ADMIN — HEPARIN SODIUM 600 UNIT(S)/HR: 5000 INJECTION INTRAVENOUS; SUBCUTANEOUS at 05:28

## 2021-06-22 RX ADMIN — CHLORHEXIDINE GLUCONATE 15 MILLILITER(S): 213 SOLUTION TOPICAL at 18:03

## 2021-06-22 RX ADMIN — Medication 5 MILLIGRAM(S): at 12:06

## 2021-06-22 RX ADMIN — DEXTROSE MONOHYDRATE, SODIUM CHLORIDE, AND POTASSIUM CHLORIDE 150 MILLILITER(S): 50; .745; 4.5 INJECTION, SOLUTION INTRAVENOUS at 05:26

## 2021-06-22 RX ADMIN — Medication 81 MILLIGRAM(S): at 12:01

## 2021-06-22 RX ADMIN — DEXMEDETOMIDINE HYDROCHLORIDE IN 0.9% SODIUM CHLORIDE 2.84 MICROGRAM(S)/KG/HR: 4 INJECTION INTRAVENOUS at 23:23

## 2021-06-22 RX ADMIN — CHLORHEXIDINE GLUCONATE 15 MILLILITER(S): 213 SOLUTION TOPICAL at 05:25

## 2021-06-22 RX ADMIN — HUMAN INSULIN 11 UNIT(S): 100 INJECTION, SUSPENSION SUBCUTANEOUS at 23:29

## 2021-06-22 RX ADMIN — CLOPIDOGREL BISULFATE 75 MILLIGRAM(S): 75 TABLET, FILM COATED ORAL at 12:01

## 2021-06-22 RX ADMIN — Medication 1 APPLICATION(S): at 18:03

## 2021-06-22 RX ADMIN — HEPARIN SODIUM 600 UNIT(S)/HR: 5000 INJECTION INTRAVENOUS; SUBCUTANEOUS at 14:32

## 2021-06-22 RX ADMIN — CHLORHEXIDINE GLUCONATE 1 APPLICATION(S): 213 SOLUTION TOPICAL at 12:02

## 2021-06-22 RX ADMIN — HUMAN INSULIN 11 UNIT(S): 100 INJECTION, SUSPENSION SUBCUTANEOUS at 18:45

## 2021-06-22 RX ADMIN — MEROPENEM 100 MILLIGRAM(S): 1 INJECTION INTRAVENOUS at 05:25

## 2021-06-22 RX ADMIN — Medication 110 MILLIGRAM(S): at 05:25

## 2021-06-22 RX ADMIN — Medication 40 MILLIEQUIVALENT(S): at 05:27

## 2021-06-22 NOTE — PROGRESS NOTE ADULT - ASSESSMENT
85 year old male with hx of diabetes who comes in for AMS, found to be hypoxic and in DKA with additional oliguric renal failure, intubated and sedated 6/20 in the ED due to worsening mental status and inability to protect airway and now with STEMI on medical management    Neuro  #AMS, likely metabolic encephalopathy iso hypercapnia and sepsis  - currently sedated on prop    Pulm   #hypercapnic respiratory failure and c/f airway protection s/p intubation 6/20/21  -CXR reviewed with bilateral loculated pleural effusions --> confirmed on POCUS, iso oliguric renal failure indicative of volume overload (BNP 50k)  -ABG this AM showing over-correction of initial respiratory acidosis; now w/ ABG: pH 7.64/16/112/24; RR decreased from 28 to 18  -monitor blood gas and adjust vent settings PRN    Cardiac:   #STEMI in leads V1/V2 w/rising troponin and CKMB  -s/p ASA/plavix load; c/w ASA, Plavix, heparin gtt  -c/f ADHF given pleural effusions and BNP 50k though w/minimal O2 requirements on ventilator (PEEP 8/FiO2 30%); hold BB and c/w bumex gtt as below  -s/p hydral 2.5mg IVP for BP control; will d/w cards re: additional options      GI  #LFT elevation, likely shock liver given acute rise in LFTs from WNL to 3000/1600 iso hypoperfusion from MI vs. less likely congestive hepatopathy  - INR 1.9, platelets 125  - continue to trend to clearance  - OG tube in place; hold TF iso DKA    #r/o C. diff given multiple episodes of diarrhea prior to arrival  - send C. diff w/next episode of diarrhea    Renal:   #LINDSEY (no previous labs to assess baseline) c/b oliguric renal failure, likely ATN 2/2 hypoperfusion iso MI vs. hypovolemia prior to MI  - HAGMA 2/2 lactic acidosis and component of DKA w/additional respiratory acidosis  - bicarb gtt d/c'ed in unit  - bumex IVP and gtt started overnight w/improvement in UOP from 0-25cc/h to 50-60cc/h; AGMA and bicarb (off bicarb gtt) improving  - continue monitoring UOP on bumex gtt; trend Cr    Endo  #DKA w/FS >600, metabolic acidosis, HAGMA, BHB 0.9  - AG improving on insulin gtt  - continue D5LR w/KCl supplement until resolution of DKA    ID:  SIRS w/hypothermia, tachycardia, leukocytosis, lactate w/o obvious source of infection  - RLE w/crusted lesions without associated erythema, warmth, or exudates  - BCx pending (6/20), started empirically on vancomycin/zosyn/doxy (6/20/21)  - MRSA swab, COVID pending     Heme/Onc:   -DVT ppx: on heparin gtt 85 year old male with hx of diabetes who comes in for AMS, found to be hypoxic and in DKA with additional oliguric renal failure, intubated and sedated 6/20 in the ED due to worsening mental status and inability to protect airway and now with STEMI on medical management    Neuro  #AMS, likely metabolic encephalopathy iso hypercapnia and sepsis  - currently sedated on prop  - on minimal vent. setting; wean prop and assess neuro status    Pulm   #hypercapnic respiratory failure and c/f airway protection s/p intubation 6/20/21  -CXR reviewed with bilateral loculated pleural effusions --> confirmed on POCUS, iso oliguric renal failure indicative of volume overload (BNP 50k)  -ABG showing over-correction of initial respiratory acidosis; now w/ ABG: pH 7.48/35/169/27 when RR decreased from 18 to 12  -monitor blood gas and adjust vent settings PRN    Cardiac:   #STEMI in leads V1/V2 w/rising troponin and CKMB  -s/p ASA/plavix load; c/w ASA, Plavix; c/w hep gtt for 48h  -c/f ADHF given pleural effusions and BNP 50k though w/minimal O2 requirements on ventilator (PEEP 8/FiO2 30%); hold BB  - off bumex gtt now  -s/p hydral 2.5mg IVP for BP control    GI  #LFT elevation, likely shock liver given acute rise in LFTs from WNL to 3000/1600 iso hypoperfusion from MI vs. less likely congestive hepatopathy  - INR 1.9, platelets 125  - continue to trend to clearance  - OG tube in place; hold TF for potential extubation today; if unable, then will restart TF and start NPH to bridge off insulin gtt    #c/f C. diff given multiple episodes of diarrhea prior to arrival; however, now pt without any diarrhea; low suspicion for c. diff  - dc precautions    Renal:   #LINDSEY (no previous labs to assess baseline) c/b oliguric renal failure, likely ATN 2/2 hypoperfusion iso MI vs. hypovolemia prior to MI  - Cr uptrending likely iso ATN that is starting to plateau  - HAGMA 2/2 lactic acidosis and component of DKA w/additional respiratory acidosis  - bicarb gtt d/c'ed in unit  - improvement in UOP w/bumex gtt; 5L over 24h  - dc bumex and monitor UOP    Endo  #DKA, resolved  - AG present but w/o BHB or acidosis; AG likely iso renal failure  - continue insulin gtt for now; will transition to subQ later today    ID:  SIRS w/hypothermia, tachycardia, leukocytosis, lactate w/o obvious source of infection; hypothermia resolved, likely was iso hypoperfusion from STEMI  - RLE w/crusted lesions without associated erythema, warmth, or exudates  - BCx pending NGTD, started empirically on vancomycin/zosyn/doxy (6/20/21- 6/21/21)  - dc abx, monitor T curve    Heme/Onc:   -DVT ppx: on heparin gtt

## 2021-06-22 NOTE — ADVANCED PRACTICE NURSE CONSULT - ASSESSMENT
Intubated via ET tube, patient on multiple IV drips. sedated with propofol, received with small Z fluidized pillow to offload head and ears, flaccidity of upper extremities, bilateral hands with non pitting edema, bedbound, incontinent of stool. Indwelling urinary  in place. Skin warm, dry with increased moisture in intertriginous folds, scattered areas of hyperpigmentation and hypopigmentation, scattered areas of ecchymosis on bilateral upper extremities.     Bilateral lower extremities with scattered areas of hyper and hypopigmentation. Lichenification. Dry, flaky skin. Right lateral lower leg with linear scar. Left medial lower leg and medial malleolus with linear scar. Thickened-yellow hyperpigmented toenails in bilateral great toe and right second toe. Multiple wounds present. No temperature Mild Increased coolness from midfoot to distal toes. No Edema. Capillary refill >3 seconds. DP/PT pulses 1+, with monophasic doppler sounds.    FuLL NOTE TO FOLLOW      Intubated via ET tube, patient on multiple IV drips. sedated with propofol, received with small Z fluidized pillow to offload head and ears, flaccidity of upper extremities, bilateral hands with non pitting edema, bedbound, incontinent of stool. Indwelling urinary  in place. Skin warm, dry with increased moisture in intertriginous folds, scattered areas of hyperpigmentation and hypopigmentation, scattered areas of ecchymosis on bilateral upper extremities.     Bilateral lower extremities with scattered areas of hyper and hypopigmentation. Lichenification. Dry, flaky skin. Right lateral lower leg with linear scar. Left medial lower leg and medial malleolus with linear scar. Hypopigmentation to left knee and anterior lower legs. Intact scab to right anterior lower leg. Thickened-yellow hyperpigmented toenails in bilateral great toe and right second and third toe. Multiple wounds present. No temperature Mild Increased coolness from midfoot to distal toes in right foot. No Edema. Capillary refill >3 seconds. DP/PT pulses 1+, with monophasic doppler sounds.    Right and left leg with two open ulcers and areas of hypopigmentation evidence of previously healed skin injuries     Right medial lower leg- [id number]  Right lateral lower leg- 1.8cmx1.5cmx0.2cm  Left lateral lower leg-            Intubated via ET tube, patient on multiple IV drips. sedated with propofol, received with small Z fluidized pillow to offload head and ears, flaccidity of upper extremities, bilateral hands with non pitting edema, bedbound, incontinent of stool. Indwelling urinary  in place. Skin warm, dry with increased moisture in intertriginous folds, scattered areas of hyperpigmentation and hypopigmentation, scattered areas of ecchymosis on bilateral upper extremities.     Bilateral lower extremities with scattered areas of hyper and hypopigmentation. Lichenification. Dry, flaky skin. Right lateral lower leg with linear scar. Left medial lower leg and medial malleolus with linear scar. Hypopigmentation to left knee and anterior lower legs. Intact scab to right anterior lower leg and left medial leg scab. Thickened-yellow hyperpigmented toenails in bilateral great toe and right second and third toe. Multiple wounds present. No temperature Mild Increased coolness from midfoot to distal toes in right foot. No Edema. Capillary refill >3 seconds. DP/PT pulses 1+, with monophasic doppler sounds.    Right and left leg with two open ulcers and areas of hypopigmentation evidence of previously healed skin injuries     Right medial lower leg- [id number]  Right lateral lower leg- 1.8cmx1.5cmx0.2cm  Left lateral lower leg- 5.3cmx1.2cmx0.2cm  All ulcers exposing pink moist dermis. Small serous drainage. No odor. Periwound skin with hypopigmentation and hyperpigmentation. No increased warmth, no erythema, no induration. Goals of care: monitor for tissue type changes.  protect periwound skin.     Moisture/Incontinence associated dermatitis in sacral fold and buttocks as evident by hyperpigmentation Intact skin.     Findings discussed with MICU team MD.            Intubated via ET tube, patient on multiple IV drips. sedated with propofol, received with small Z fluidized pillow to offload head and ears, flaccidity of upper extremities, bilateral hands with non pitting edema, bedbound, incontinent of stool. Indwelling urinary  in place. Skin warm, dry with increased moisture in intertriginous folds, scattered areas of hyperpigmentation and hypopigmentation, scattered areas of ecchymosis on bilateral upper extremities.     Bilateral lower extremities with scattered areas of hyper and hypopigmentation. Lichenification. Dry, flaky skin. Right lateral lower leg with linear scar. Left medial lower leg and medial malleolus with linear scar. Hypopigmentation to left knee and anterior lower legs. Intact scab to right anterior lower leg and left medial leg scab. Thickened-yellow hyperpigmented toenails in bilateral great toe and right second and third toe. Multiple wounds present. No temperature Mild Increased coolness from midfoot to distal toes in right foot. No Edema. Capillary refill >3 seconds. DP/PT pulses 1+, with monophasic doppler sounds.    Right and left leg with two open ulcers and areas of hypopigmentation evidence of previously healed skin injuries     Right medial lower leg- [id number]  Right lateral lower leg- 1.8cmx1.5cmx0.2cm  Left lateral lower leg- 5.3cmx1.2cmx0.2cm  All ulcers exposing pink moist dermis. Small serous drainage. No odor. Periwound skin with hypopigmentation and hyperpigmentation. No increased warmth, no erythema, no induration. Goals of care: monitor for tissue type changes.  protect periwound skin.     Moisture/Incontinence associated dermatitis in sacral fold and buttocks as evident by hyperpigmentation Intact skin.     Findings discussed with MICU team MD Phan

## 2021-06-22 NOTE — DIETITIAN INITIAL EVALUATION ADULT. - ENTERAL
Start Glucerna 1.5 @ 10mL/hr then increase, as tolerated, by 10mL q4hrs to goal of 30mL/hr continuously +2 packets NoCarb Prosource per day                                                          [provides 720mL total volume, 1080 kcals (+343 Propofol kcals= total of 1463 kcals), total of 89g protein & 546mL free water, daily]                                                                    **gives 25 kcals/kg actual weight & ~1.4g protein/kg Ideal Body Weight)

## 2021-06-22 NOTE — DIETITIAN INITIAL EVALUATION ADULT. - PERTINENT MEDS FT
MEDICATIONS  (STANDING):  aspirin  chewable 81 milliGRAM(s) Oral daily  buMETAnide Infusion 2 mG/Hr (10 mL/Hr) IV Continuous <Continuous>  chlorhexidine 0.12% Liquid 15 milliLiter(s) Oral Mucosa every 12 hours  chlorhexidine 2% Cloths 1 Application(s) Topical daily  clopidogrel Tablet 75 milliGRAM(s) Oral daily  dextrose 5% + lactated ringers with potassium chloride 20 mEq/L 1000 milliLiter(s) (150 mL/Hr) IV Continuous <Continuous>  heparin  Infusion.  Unit(s)/Hr (7 mL/Hr) IV Continuous <Continuous>  insulin regular Infusion 1 Unit(s)/Hr (1 mL/Hr) IV Continuous <Continuous>  meropenem  IVPB 500 milliGRAM(s) IV Intermittent every 12 hours  petrolatum Ophthalmic Ointment 1 Application(s) Both EYES two times a day  propofol Infusion 10 MICROgram(s)/kG/Min (3.4 mL/Hr) IV Continuous <Continuous>

## 2021-06-22 NOTE — PROGRESS NOTE ADULT - SUBJECTIVE AND OBJECTIVE BOX
[facility] Division of Kidney Diseases & Hypertension  FOLLOW UP NOTE  [phone]--------------------------------------------------------------------------------  Chief Complaint:Type 2 diabetes mellitus with ketoacidosis without coma      HPI: 85 year old male with PMHx HTN, DM  was brought in by EMS for lethargic and respiratory failure. Per ED note and collateral info from son, patient not feeling well since yesterday and was given 0.4 nitro by son. Pt presented to the ED and was reportedly hypoxic to 60s and lethargic. Mental status waxing and waning, patient was placed on BIPAP then subsequently intubated for airway protection.On admission, labs notable for metabolic acidosis, elevated lactate (pH: 7.02, serum CO2 low at 8 with lactate 11.7) and hyperglycemia. Pt received Vancomycin/Zosyn, 2L IVF, 1 amp sodium bicarbonate and started on Insulin drip. Nephrology team consulted for LINDSEY and acidosis. No previous labs for review on [facility]. On admission, pt with elevated SCr of 2.09 (6/10). Pt seen and examined in ICU. Pt intubated with FIO2: 50% PEEP+8) and sedated. BP stable without any IV vasopressor support.  CXR with bilateral pleural effusions. Review of meds showed pt on Atenolol, Telmisartan, Isosorbide and Colesevelam. Pt on insulin drip for possible DKA & Iv bumex for volume overload      Pt seen & examined. Remains intubated & sedated. FiO2 30% with PEEP 5. Remains on a bumex & insulin gtt. UOP 5L in 24 hrs          PAST HISTORY  --------------------------------------------------------------------------------  No significant changes to PMH, PSH, FHx, SHx, unless otherwise noted    ALLERGIES & MEDICATIONS  --------------------------------------------------------------------------------  Allergies    dyes,iv dyes (Unknown)  iodinated radiocontrast agents (Hives)    Intolerances      Standing Inpatient Medications  aspirin  chewable 81 milliGRAM(s) Oral daily  buMETAnide Infusion 2 mG/Hr IV Continuous <Continuous>  chlorhexidine 0.12% Liquid 15 milliLiter(s) Oral Mucosa every 12 hours  chlorhexidine 2% Cloths 1 Application(s) Topical daily  clopidogrel Tablet 75 milliGRAM(s) Oral daily  dextrose 5% + lactated ringers with potassium chloride 20 mEq/L 1000 milliLiter(s) IV Continuous <Continuous>  heparin  Infusion.  Unit(s)/Hr IV Continuous <Continuous>  insulin regular Infusion 1 Unit(s)/Hr IV Continuous <Continuous>  meropenem  IVPB 500 milliGRAM(s) IV Intermittent every 12 hours  petrolatum Ophthalmic Ointment 1 Application(s) Both EYES two times a day  propofol Infusion 10 MICROgram(s)/kG/Min IV Continuous <Continuous>    PRN Inpatient Medications  hydrALAZINE Injectable 5 milliGRAM(s) IV Push every 6 hours PRN      REVIEW OF SYSTEMS  --------------------------------------------------------------------------------  Unobtainable to obtain    VITALS/PHYSICAL EXAM  --------------------------------------------------------------------------------  T(C): 36.4 (06-22-21 @ 08:00), Max: 37.5 (06-21-21 @ 12:00)  HR: 64 (06-22-21 @ 09:20) (64 - 85)  BP: --  RR: 12 (06-22-21 @ 09:20) (12 - 36)  SpO2: 98% (06-22-21 @ 09:20) (96% - 100%)  Wt(kg): --  Height (cm): 167.6 (06-21-21 @ 08:00)  Weight (kg): 56.7 (06-20-21 @ 16:02)  BMI (kg/m2): 20.2 (06-21-21 @ 08:00)  BSA (m2): 1.64 (06-21-21 @ 08:00)      06-21-21 @ 07:01  -  06-22-21 @ 07:00  --------------------------------------------------------  IN: 3353.5 mL / OUT: 5100 mL / NET: -1746.5 mL    06-22-21 @ 07:01  -  06-22-21 @ 10:23  --------------------------------------------------------  IN: 171 mL / OUT: 500 mL / NET: -329 mL      Physical Exam:   Gen: Intubated and sedated  	HEENT: +vent  	Pulm: mechanical breath sounds, fair air entry, decreased  	CV:  S1S2  	Abd: +BS, soft   	Ext: no bilateral LE edema, IO line on right leg  	Neuro: sedated  	Skin: Warm, without rashes  	Vascular access:     LABS/STUDIES  --------------------------------------------------------------------------------              12.1   18.33 >-----------<  141      [06-22-21 @ 03:52]              36.6     141  |  103  |  27  ----------------------------<  239      [06-22-21 @ 08:44]  4.4   |  23  |  2.77        Ca     8.8     [06-22-21 @ 08:44]      Mg     1.9     [06-22-21 @ 08:44]      Phos  3.4     [06-22-21 @ 08:44]    TPro  5.8  /  Alb  3.1  /  TBili  0.8  /  DBili  x   /  AST  2312  /  ALT  1511  /  AlkPhos  99  [06-21-21 @ 18:31]    PT/INR: PT 17.7 , INR 1.59       [06-22-21 @ 03:54]  PTT: 58.7       [06-22-21 @ 03:52]    CK 1437      [06-20-21 @ 23:38]    Creatinine Trend:  SCr 2.77 [06-22 @ 08:44]  SCr 2.78 [06-22 @ 03:52]  SCr 2.64 [06-21 @ 21:20]  SCr 2.73 [06-21 @ 18:31]  SCr 2.49 [06-21 @ 11:15]    Urinalysis - [06-20-21 @ 16:48]      Color Yellow / Appearance Clear / SG 1.032 / pH 6.5      Gluc >= 1000 mg/dL / Ketone Trace  / Bili Negative / Urobili <2 mg/dL       Blood Moderate / Protein >600 mg/dL / Leuk Est Negative / Nitrite Negative      RBC 52 / WBC 9 / Hyaline 1 / Gran  / Sq Epi  / Non Sq Epi 8 / Bacteria Few    Urine Creatinine 7      [06-21-21 @ 14:43]  Urine Protein 32      [06-21-21 @ 14:43]

## 2021-06-22 NOTE — PROGRESS NOTE ADULT - ASSESSMENT
86 yo M with PMH of CAD s/p remote hx of PCI (unclear anatomy), uncontrolled T2DM, and HLD p/w hypoxia/AMS admitted to MICU for DKA on insulin gtt and likely late presentation MI. Intubated for AMS, and MICU course c/b ARF and shock liver.     TTE 6/2021  CONCLUSIONS:  1. Mitral annular calcification, otherwise normal mitral  valve. Minimal mitral regurgitation.  2. Calcified trileaflet aortic valve with normal opening.  Mild aortic regurgitation.  3. Normal left ventricular internal dimensions and wall  thicknesses.  4. Severe global left ventricular systolic dysfunction.  Endocardialvisualization enhanced with intravenous  injection of echo contrast (Definity).  5. Mild diastolic dysfunction (Stage I).  6. The right ventricle is not well visualized; grossly  normal right ventricular systolic function.  7. Estimated right ventricular systolic pressure equals 52  mm Hg, assuming right atrial pressure equals 10 mm Hg,  consistent with moderate pulmonary hypertension.  8. Left pleural effusion.    -cont DAPT  with ASA and Plavix   -TTE reviewed, cont heparin gtt for a total of 48 hours   -cont bumex gtt, UO improving. would ensure adequate sedation prior to giving IV BP med. If more BP control is needed, IV hydralazine 5-10 mg PRN   -troponin has peaked, would not trend it further   -currently not a candidate for invasive intervention i.e multiorgan failure, contrast allergy.   -appreciate MICU care     Thank you for allowing us to participate in the care of your patient. If you have any questions or concerns please do not hesitate to contact us 24/7.     Reese Sam MD x [phone]  Cardiology Fellow, [facility]  All Cardiology service information can be found 24/7 on [email or web address], password: [email or web address]

## 2021-06-22 NOTE — ADVANCED PRACTICE NURSE CONSULT - RECOMMEDATIONS
Recommend AMADA/PVR    Recommend AMADA/PVR   If overall health improves, Recommend follow up care at [facility]: [phone]. Address: [address] [place] [address].   If tissue deteriorates consider vascular consult     Topical Recommendations     Bilateral leg ulcers: Clean with NS. Pat dry. Apply Liquid barrier film to periwound skin. Cover with Silicone foam with borders. Change daily.     Post wound care: Apply Sween 24 moisturizer to legs    Bilateral buttocks and sacral fold: Clean with skin cleanser. Pat dry. Apply Osorio barrier cream to entire area. Apply twice a day or PRN.     Continue low air loss bed therapy, continue heel elevation with Z-flex fluidized positioning boots, continue to turn & reposition q2h with Z-da fluidized positioning device, soft pillow between bony prominences, behind legs and knees, continue moisture management with barrier creams & single breathable pad, continue measures to decrease friction/shear/pressure.     Please contact Wound Care Service Line if we can be of further assistance (ext [phone]).  Recommend AMADA/PVR   If overall health improves, Recommend follow up care at [facility]: [phone]. Address: [address] [place] [address].   If tissue deteriorates consider vascular consult     Topical Recommendations     Continue to offload head and ears with small Z fluidized pillow     Bilateral leg ulcers: Clean with NS. Pat dry. Apply Liquid barrier film to periwound skin. Cover with Silicone foam with borders. Change daily.     Post wound care: Apply Sween 24 moisturizer to legs    Bilateral buttocks and sacral fold: Clean with skin cleanser. Pat dry. Apply Osorio barrier cream to entire area. Apply twice a day or PRN.     Continue low air loss bed therapy, continue heel elevation with Z-flex fluidized positioning boots, continue to turn & reposition q2h with Z-da fluidized positioning device, soft pillow between bony prominences, behind legs and knees, continue moisture management with barrier creams & single breathable pad, continue measures to decrease friction/shear/pressure.     Please contact Wound Care Service Line if we can be of further assistance (ext [phone]).

## 2021-06-22 NOTE — PROGRESS NOTE ADULT - SUBJECTIVE AND OBJECTIVE BOX
Kai Vidales, [facility]  Internal Medicine  [facility] [address]  [phone]    Patient is a 85y old  Male who presents with a chief complaint of AMS, Hypoxia (2021 07:56)      SUBJECTIVE / OVERNIGHT EVENTS:    MEDICATIONS  (STANDING):  aspirin  chewable 81 milliGRAM(s) Oral daily  buMETAnide Infusion 2 mG/Hr (10 mL/Hr) IV Continuous <Continuous>  chlorhexidine 0.12% Liquid 15 milliLiter(s) Oral Mucosa every 12 hours  chlorhexidine 2% Cloths 1 Application(s) Topical daily  clopidogrel Tablet 75 milliGRAM(s) Oral daily  dextrose 5% + lactated ringers with potassium chloride 20 mEq/L 1000 milliLiter(s) (150 mL/Hr) IV Continuous <Continuous>  doxycycline IVPB 100 milliGRAM(s) IV Intermittent every 12 hours  heparin  Infusion.  Unit(s)/Hr (7 mL/Hr) IV Continuous <Continuous>  insulin regular Infusion 1 Unit(s)/Hr (1 mL/Hr) IV Continuous <Continuous>  meropenem  IVPB 500 milliGRAM(s) IV Intermittent every 12 hours  petrolatum Ophthalmic Ointment 1 Application(s) Both EYES two times a day  propofol Infusion 10 MICROgram(s)/kG/Min (3.4 mL/Hr) IV Continuous <Continuous>    MEDICATIONS  (PRN):  hydrALAZINE Injectable 5 milliGRAM(s) IV Push every 6 hours PRN for SBP >160      PHYSICAL EXAM:  Vital Signs Last 24 Hrs  T(C): 37.2 (2021 04:00), Max: 37.8 (2021 08:00)  T(F): 99 (2021 04:00), Max: 100.1 (2021 08:00)  HR: 67 (2021 06:00) (67 - 85)  BP: 138/86 (2021 07:30) (138/86 - 138/86)  BP(mean): 97 (2021 07:30) (97 - 97)  RR: 12 (2021 06:00) (12 - 36)  SpO2: 99% (2021 06:00) (95% - 100%)    CONSTITUTIONAL: NAD, well-developed  EYES: conjunctiva and sclera clear  ENMT: Moist oral mucosa  NECK: Supple, no palpable masses  RESPIRATORY: Normal WOB; lungs are CTA b/l  CARDIOVASCULAR: RRR; S1/S2 present; no m/r/g; No LE edema; Peripheral pulses are 2+ bilaterally  ABDOMEN: Soft, nontender, normoactive BS, no rebound/guarding; No hepatosplenomegaly  MUSCULOSKELETAL:  moving all extremities  NEUROLOGY: awake, A&O to person, place, and time  SKIN: No rashes  ----  I&O's Summary    2021 07:01  -  2021 07:00  --------------------------------------------------------  IN: 3343.5 mL / OUT: 5100 mL / NET: -1756.5 mL      ----  LABS:                        12.1   18.33 )-----------( 141      ( 2021 03:52 )             36.6     ----      139  |  101  |  26<H>  ----------------------------<  175<H>  3.4<L>   |  22  |  2.78<H>    Ca    9.2      2021 03:52  Phos  3.6       Mg     2.0         TPro  5.8<L>  /  Alb  3.1<L>  /  TBili  0.8  /  DBili  x   /  AST  2312<H>  /  ALT  1511<H>  /  AlkPhos  99  06-21    ----  PT/INR - ( 2021 03:54 )   PT: 17.7 sec;   INR: 1.59 ratio         PTT - ( 2021 03:52 )  PTT:58.7 sec  ----  CARDIAC MARKERS ( 2021 05:30 )  x     / x     / x     / x     / 97.4 ng/mL  CARDIAC MARKERS ( 2021 23:38 )  x     / x     / 1437 U/L / x     / 80.3 ng/mL  CARDIAC MARKERS ( 2021 19:52 )  x     / x     / 426 U/L / x     / 18.8 ng/mL      ----  Urinalysis Basic - ( 2021 16:48 )    Color: Yellow / Appearance: Clear / S.032 / pH: x  Gluc: x / Ketone: Trace  / Bili: Negative / Urobili: <2 mg/dL   Blood: x / Protein: >600 mg/dL / Nitrite: Negative   Leuk Esterase: Negative / RBC: 52 /HPF / WBC 9 /HPF   Sq Epi: x / Non Sq Epi: 8 /HPF / Bacteria: Few      ----    Culture - Sputum (collected 2021 00:46)  Source: .Sputum Sputum  Gram Stain (2021 07:19):    No polymorphonuclear leukocytes per low power field    No Squamous epithelial cells per low power field    No organisms seen per oil power field    Culture - Blood (collected 2021 05:31)  Source: .Blood Blood-Peripheral  Preliminary Report (2021 06:01):    No growth to date.    Culture - Blood (collected 2021 18:25)  Source: .Blood Blood-Peripheral  Preliminary Report (2021 19:02):    No growth to date.    Culture - Blood (collected 2021 18:25)  Source: .Blood Blood-Peripheral  Preliminary Report (2021 19:02):    No growth to date.        RADIOLOGY & ADDITIONAL TESTS:  Results Reviewed:   Imaging Personally Reviewed:  Electrocardiogram Personally Reviewed: Kai Vidales, [facility]  Internal Medicine  [facility] [address]  [phone]    Patient is a 85y old  Male who presents with a chief complaint of AMS, Hypoxia (2021 07:56)      SUBJECTIVE / OVERNIGHT EVENTS:  - overnight, afebrile (hypothermia resolved), HR 70-80s, + w/o pressors  - UOP 5L over 24h on bumex gtt; d/c;ed gtt this AM    MEDICATIONS  (STANDING):  aspirin  chewable 81 milliGRAM(s) Oral daily  buMETAnide Infusion 2 mG/Hr (10 mL/Hr) IV Continuous <Continuous>  chlorhexidine 0.12% Liquid 15 milliLiter(s) Oral Mucosa every 12 hours  chlorhexidine 2% Cloths 1 Application(s) Topical daily  clopidogrel Tablet 75 milliGRAM(s) Oral daily  dextrose 5% + lactated ringers with potassium chloride 20 mEq/L 1000 milliLiter(s) (150 mL/Hr) IV Continuous <Continuous>  doxycycline IVPB 100 milliGRAM(s) IV Intermittent every 12 hours  heparin  Infusion.  Unit(s)/Hr (7 mL/Hr) IV Continuous <Continuous>  insulin regular Infusion 1 Unit(s)/Hr (1 mL/Hr) IV Continuous <Continuous>  meropenem  IVPB 500 milliGRAM(s) IV Intermittent every 12 hours  petrolatum Ophthalmic Ointment 1 Application(s) Both EYES two times a day  propofol Infusion 10 MICROgram(s)/kG/Min (3.4 mL/Hr) IV Continuous <Continuous>    MEDICATIONS  (PRN):  hydrALAZINE Injectable 5 milliGRAM(s) IV Push every 6 hours PRN for SBP >160      PHYSICAL EXAM:  Vital Signs Last 24 Hrs  T(C): 37.2 (2021 04:00), Max: 37.8 (2021 08:00)  T(F): 99 (2021 04:00), Max: 100.1 (2021 08:00)  HR: 67 (2021 06:00) (67 - 85)  BP: 138/86 (2021 07:30) (138/86 - 138/86)  BP(mean): 97 (2021 07:30) (97 - 97)  RR: 12 (2021 06:00) (12 - 36)  SpO2: 99% (2021 06:00) (95% - 100%)    Constitutional: sedated, intubated  EYES: conjunctiva clear  Respiratory: intubated on minimal settings; Breath sounds are clear bilaterally, No wheezing, rales or rhonchi  Cardiovascular: S1 and S2, regular rate and rhythm, no Murmurs, gallops or rubs, no JVD,    Gastrointestinal: Bowel Sounds present, soft, nontender, nondistended, no guarding, no rebound  Extremities: No cyanosis or clubbing; warm to touch  Vascular: 2+ peripheral pulses lower ex  Neurological: sedated; Pupils are equally reactive to light and symmetrical in size  Skin: RLE w/crusting; no exudated, drainage, or erythema    ----  I&O's Summary    2021 07:01  -  2021 07:00  --------------------------------------------------------  IN: 3343.5 mL / OUT: 5100 mL / NET: -1756.5 mL      ----  LABS:                        12.1   18.33 )-----------( 141      ( 2021 03:52 )             36.6     ----  22    139  |  101  |  26<H>  ----------------------------<  175<H>  3.4<L>   |  22  |  2.78<H>    Ca    9.2      2021 03:52  Phos  3.6       Mg     2.0         TPro  5.8<L>  /  Alb  3.1<L>  /  TBili  0.8  /  DBili  x   /  AST  2312<H>  /  ALT  1511<H>  /  AlkPhos  99  06-21    ----  PT/INR - ( 2021 03:54 )   PT: 17.7 sec;   INR: 1.59 ratio         PTT - ( 2021 03:52 )  PTT:58.7 sec  ----  CARDIAC MARKERS ( 2021 05:30 )  x     / x     / x     / x     / 97.4 ng/mL  CARDIAC MARKERS ( 2021 23:38 )  x     / x     / 1437 U/L / x     / 80.3 ng/mL  CARDIAC MARKERS ( 2021 19:52 )  x     / x     / 426 U/L / x     / 18.8 ng/mL      ----  Urinalysis Basic - ( 2021 16:48 )    Color: Yellow / Appearance: Clear / S.032 / pH: x  Gluc: x / Ketone: Trace  / Bili: Negative / Urobili: <2 mg/dL   Blood: x / Protein: >600 mg/dL / Nitrite: Negative   Leuk Esterase: Negative / RBC: 52 /HPF / WBC 9 /HPF   Sq Epi: x / Non Sq Epi: 8 /HPF / Bacteria: Few      ----    Culture - Sputum (collected 2021 00:46)  Source: .Sputum Sputum  Gram Stain (2021 07:19):    No polymorphonuclear leukocytes per low power field    No Squamous epithelial cells per low power field    No organisms seen per oil power field    Culture - Blood (collected 2021 05:31)  Source: .Blood Blood-Peripheral  Preliminary Report (2021 06:01):    No growth to date.    Culture - Blood (collected 2021 18:25)  Source: .Blood Blood-Peripheral  Preliminary Report (2021 19:02):    No growth to date.    Culture - Blood (collected 2021 18:25)  Source: .Blood Blood-Peripheral  Preliminary Report (2021 19:02):    No growth to date

## 2021-06-22 NOTE — DIETITIAN INITIAL EVALUATION ADULT. - OTHER INFO
Pt. w PMH HTN, admitted with DKA and now with LINDSEY, NSTEMI, and hypercapnic respiratory failure requiring intubation/sedation.  Pt. NPO due to DKA.   Spoke w resident and RN.  Will initiate enteral feedings when anion gap improves.    Propofol at current rate of 13mL/hr will provide 343 non-nutritive lipid calories over 24hrs.    No noted diarrhea/constipation @ this time.

## 2021-06-22 NOTE — PROGRESS NOTE ADULT - ASSESSMENT
Pt with LINDSEY and metabolic acidosis    LINDSEY (non oliguric) on possible CKD-  Pt with hemodynamically mediated LINDSEY in the setting of SIRS, STEMI, DKA and medication use (Telmisartan). No previous labs for review. On admission, SCr elevated at 2.09 (6/20). Peaked to 2.78 on 6/21/21. Today remains stable elevated / stable at 2.77.  UA dirty with proteinuria, hematuria, pyuria. POCUS exam by ICU team showed evidence suggestive of chronic kidney disease.  Spot urine TP/CR 4.6. Send urine electrolytes. Kidney sonogram: Right kidney: 10.2 cm. No hydronephrosis. Upper pole cyst measures 2.1 x 1.9 x 1.8 cm.Left kidney: 9.1 cm. No hydronephrosis. Upper pole cyst measures 2.1 x 1.9 x 2.0 cm.Urinary bladder: Lilly catheter. Soft tissue protruding into the base of the bladder, likely related to hypertrophied prostate gland, similar in appearance to CT dated8/12/201. Currently has a Lilly. Appears euvolemic on exam. Recommend to hold off on the Bumex IV infusion. UOP 5L in 24 hrs.  Will need to consider CVVHDF if renal failure continues to worsen. Hold Telmisartan. Monitor labs and urine output. Avoid NSAIDs, ACEI/ARBS, RCA and nephrotoxins. Dose medications as per eGFR.        Metabolic acidosis with concomitant respiratory acidosis-   Pt initially with anion gap metabolic and lactic acidosis in the setting of likely DKA with LINDSEY. On admission, Serum CO2 low at 8 with lactate 11.7 and pH: 7.04. Pt received IV sodium bicarbonate along with management of DKA.  Repeat ABG today with 7.48/32/25 Serum CO2: 23 and lactate 2.3. Respiratory alkalosis on ventilator. Would continue to monitor pH and serum CO2 levels for now.         If you have any questions, please feel free to contact me  Tatiana Chandler  Nephrology Fellow  [phone]  (After 5pm or on weekends please page the on-call fellow)    Pt with LINDSEY and metabolic acidosis    LINDSEY (non oliguric) on possible CKD-  Pt with hemodynamically mediated LINDSEY in the setting of SIRS, STEMI, DKA and medication use (Telmisartan). No previous labs for review. On admission, SCr elevated at 2.09 (6/20). Peaked to 2.78 on 6/21/21. Today remains stable elevated / stable at 2.77.  UA dirty with proteinuria, hematuria, pyuria.   Spot urine TP/CR 4.6. Send urine electrolytes.  Currently has a Lilly. Appears euvolemic on exam. Recommend to hold off on the Bumex IV infusion. UOP 5L in 24 hrs.  Will need to consider CVVHDF if renal failure continues to worsen. Hold Telmisartan. Monitor labs and urine output. Avoid NSAIDs, ACEI/ARBS, RCA and nephrotoxins. Dose medications as per eGFR.    Metabolic acidosis with concomitant respiratory acidosis-   Pt initially with anion gap metabolic and lactic acidosis in the setting of likely DKA with LINDSEY. On admission, Serum CO2 low at 8 with lactate 11.7 and pH: 7.04. Pt received IV sodium bicarbonate along with management of DKA.  Repeat ABG today with 7.48/32/25 Serum CO2: 23 and lactate 2.3. Respiratory alkalosis on ventilator. Would continue to monitor pH and serum CO2 levels for now.         If you have any questions, please feel free to contact me  Tatiana Chandler  Nephrology Fellow  [phone]  (After 5pm or on weekends please page the on-call fellow)

## 2021-06-22 NOTE — ADVANCED PRACTICE NURSE CONSULT - REASON FOR CONSULT
Patient seen on skin care rounds after wound care referral received for assessment of skin impairment and recommendations of topical management. Chart reviewed: Antonio 12, BMI 20.2kg/m2, WBC 18.33, Hemoglobin/HCT: 12.1/36.6, platelet 141. Patient H/O of coming to hospital brought in by EMS today.  Per ED note and collateral info from son, patient not feeling well since yesterday and was given 0.4 nitro by son. Came to ED and reportedly hypoxic to 60s and lethargic. Mental status waxing and waning, patient was placed on bipap then subsequently intubated for airway protection in the ED. Patient found to be severely acidotic with worsening renal function from baseline as as tachycardic and leukocytosis. Given dose of vanc/zosyn, bcx drawn, 2L IVF, potassium, bicarb, started on insulin gtt. Patient has being seeing by cardiology for elevated troponin and nephrology for acidosis.

## 2021-06-22 NOTE — PROGRESS NOTE ADULT - SUBJECTIVE AND OBJECTIVE BOX
Patient seen and examined at bedside.    Overnight Events:   Intubated/sedated. On bumex gtt with improved UO. Trop 5100     REVIEW OF SYSTEMS:  Constitutional:     [x ] negative [ ] fevers [ ] chills [ ] weight loss [ ] weight gain  HEENT:                  [x ] negative [ ] dry eyes [ ] eye irritation [ ] postnasal drip [ ] nasal congestion  CV:                         [ x] negative  [ ] chest pain [ ] orthopnea [ ] palpitations [ ] murmur  Resp:                     [ x] negative [ ] cough [ ] shortness of breath [ ] dyspnea [ ] wheezing [ ] sputum [ ]hemoptysis  GI:                          [ x] negative [ ] nausea [ ] vomiting [ ] diarrhea [ ] constipation [ ] abd pain [ ] dysphagia   :                        [ x] negative [ ] dysuria [ ] nocturia [ ] hematuria [ ] increased urinary frequency  Musculoskeletal: [ x] negative [ ] back pain [ ] myalgias [ ] arthralgias [ ] fracture  Skin:                       [ x] negative [ ] rash [ ] itch  Neurological:        [x ] negative [ ] headache [ ] dizziness [ ] syncope [ ] weakness [ ] numbness  Psychiatric:           [ x] negative [ ] anxiety [ ] depression  Endocrine:            [ x] negative [ ] diabetes [ ] thyroid problem  Heme/Lymph:      [ x] negative [ ] anemia [ ] bleeding problem  Allergic/Immune: [ x] negative [ ] itchy eyes [ ] nasal discharge [ ] hives [ ] angioedema    [ x] All other systems negative  [ ] Unable to assess ROS due to    Current Meds:  aspirin  chewable 81 milliGRAM(s) Oral daily  buMETAnide Infusion 2 mG/Hr IV Continuous <Continuous>  chlorhexidine 0.12% Liquid 15 milliLiter(s) Oral Mucosa every 12 hours  chlorhexidine 2% Cloths 1 Application(s) Topical daily  clopidogrel Tablet 75 milliGRAM(s) Oral daily  dextrose 5% + lactated ringers with potassium chloride 20 mEq/L 1000 milliLiter(s) IV Continuous <Continuous>  heparin  Infusion.  Unit(s)/Hr IV Continuous <Continuous>  hydrALAZINE Injectable 5 milliGRAM(s) IV Push every 6 hours PRN  insulin regular Infusion 1 Unit(s)/Hr IV Continuous <Continuous>  meropenem  IVPB 500 milliGRAM(s) IV Intermittent every 12 hours  petrolatum Ophthalmic Ointment 1 Application(s) Both EYES two times a day  propofol Infusion 10 MICROgram(s)/kG/Min IV Continuous <Continuous>      PAST MEDICAL & SURGICAL HISTORY:  HTN (hypertension)    Diabetes, type I        Vitals:  T(F): 99 (06-22), Max: 99.5 (06-21)  HR: 66 (06-22) (64 - 85)  BP: --  RR: 12 (06-22)  SpO2: 99% (06-22)  I&O's Summary    21 Jun 2021 07:01  -  22 Jun 2021 07:00  --------------------------------------------------------  IN: 3343.5 mL / OUT: 5100 mL / NET: -1756.5 mL        Physical Exam:    NAD, intubated sedated   No JVD   CTABL anteriorly   RRR, no MRG   Soft NT   Trace edema                        12.1   18.33 )-----------( 141      ( 22 Jun 2021 03:52 )             36.6     06-22    139  |  101  |  26<H>  ----------------------------<  175<H>  3.4<L>   |  22  |  2.78<H>    Ca    9.2      22 Jun 2021 03:52  Phos  3.6     06-22  Mg     2.0     06-22    TPro  5.8<L>  /  Alb  3.1<L>  /  TBili  0.8  /  DBili  x   /  AST  2312<H>  /  ALT  1511<H>  /  AlkPhos  99  06-21    PT/INR - ( 22 Jun 2021 03:54 )   PT: 17.7 sec;   INR: 1.59 ratio         PTT - ( 22 Jun 2021 03:52 )  PTT:58.7 sec  CARDIAC MARKERS ( 21 Jun 2021 05:30 )  x     / x     / x     / x     / 97.4 ng/mL  CARDIAC MARKERS ( 20 Jun 2021 23:38 )  x     / x     / 1437 U/L / x     / 80.3 ng/mL  CARDIAC MARKERS ( 20 Jun 2021 19:52 )  x     / x     / 426 U/L / x     / 18.8 ng/mL      Serum Pro-Brain Natriuretic Peptide: 46333 pg/mL (06-20 @ 19:52)          Cardiovascular Testings:       Interpretation of Telemetry:

## 2021-06-22 NOTE — DIETITIAN INITIAL EVALUATION ADULT. - PERTINENT LABORATORY DATA
06-22    141  |  103  |  27<H>  ----------------------------<  239<H>  4.4   |  23  |  2.77<H>    Ca    8.8      22 Jun 2021 08:44  Phos  3.4     06-22  Mg     1.9     06-22    TPro  5.8<L>  /  Alb  3.1<L>  /  TBili  0.8  /  DBili  x   /  AST  2312<H>  /  ALT  1511<H>  /  AlkPhos  99  06-21    CAPILLARY BLOOD GLUCOSE      POCT Blood Glucose.: 215 mg/dL (22 Jun 2021 09:20)  POCT Blood Glucose.: 230 mg/dL (22 Jun 2021 08:27)  POCT Blood Glucose.: 161 mg/dL (22 Jun 2021 06:56)  POCT Blood Glucose.: 182 mg/dL (22 Jun 2021 06:07)  POCT Blood Glucose.: 169 mg/dL (22 Jun 2021 05:16)  POCT Blood Glucose.: 146 mg/dL (22 Jun 2021 04:12)  POCT Blood Glucose.: 179 mg/dL (22 Jun 2021 03:30)  POCT Blood Glucose.: 197 mg/dL (22 Jun 2021 02:39)  POCT Blood Glucose.: 222 mg/dL (22 Jun 2021 01:20)  POCT Blood Glucose.: 229 mg/dL (22 Jun 2021 00:05)  POCT Blood Glucose.: 216 mg/dL (21 Jun 2021 22:57)  POCT Blood Glucose.: 193 mg/dL (21 Jun 2021 22:03)  POCT Blood Glucose.: 182 mg/dL (21 Jun 2021 21:12)  POCT Blood Glucose.: 152 mg/dL (21 Jun 2021 19:55)  POCT Blood Glucose.: 127 mg/dL (21 Jun 2021 19:02)  POCT Blood Glucose.: 72 mg/dL (21 Jun 2021 17:43)  POCT Blood Glucose.: 79 mg/dL (21 Jun 2021 17:34)  POCT Blood Glucose.: 140 mg/dL (21 Jun 2021 15:57)  POCT Blood Glucose.: 158 mg/dL (21 Jun 2021 15:07)  POCT Blood Glucose.: 146 mg/dL (21 Jun 2021 14:11)  POCT Blood Glucose.: 132 mg/dL (21 Jun 2021 12:49)  POCT Blood Glucose.: 115 mg/dL (21 Jun 2021 11:29)  POCT Blood Glucose.: 100 mg/dL (21 Jun 2021 10:27)

## 2021-06-23 LAB
ALBUMIN SERPL ELPH-MCNC: 2.6 G/DL — LOW (ref 3.3–5)
ALP SERPL-CCNC: 100 U/L — SIGNIFICANT CHANGE UP (ref 40–120)
ALT FLD-CCNC: 752 U/L — HIGH (ref 4–41)
ANION GAP SERPL CALC-SCNC: 16 MMOL/L — HIGH (ref 7–14)
AST SERPL-CCNC: 244 U/L — HIGH (ref 4–40)
BILIRUB DIRECT SERPL-MCNC: 0.3 MG/DL — HIGH (ref 0–0.2)
BILIRUB INDIRECT FLD-MCNC: 0.3 MG/DL — SIGNIFICANT CHANGE UP (ref 0–1)
BILIRUB SERPL-MCNC: 0.6 MG/DL — SIGNIFICANT CHANGE UP (ref 0.2–1.2)
BLOOD GAS ARTERIAL - LYTES,HGB,ICA,LACT RESULT: SIGNIFICANT CHANGE UP
BLOOD GAS ARTERIAL - LYTES,HGB,ICA,LACT RESULT: SIGNIFICANT CHANGE UP
BUN SERPL-MCNC: 34 MG/DL — HIGH (ref 7–23)
CALCIUM SERPL-MCNC: 9.3 MG/DL — SIGNIFICANT CHANGE UP (ref 8.4–10.5)
CHLORIDE SERPL-SCNC: 101 MMOL/L — SIGNIFICANT CHANGE UP (ref 98–107)
CO2 SERPL-SCNC: 24 MMOL/L — SIGNIFICANT CHANGE UP (ref 22–31)
CREAT SERPL-MCNC: 2.81 MG/DL — HIGH (ref 0.5–1.3)
CULTURE RESULTS: SIGNIFICANT CHANGE UP
GLUCOSE BLDC GLUCOMTR-MCNC: 107 MG/DL — HIGH (ref 70–99)
GLUCOSE BLDC GLUCOMTR-MCNC: 118 MG/DL — HIGH (ref 70–99)
GLUCOSE BLDC GLUCOMTR-MCNC: 124 MG/DL — HIGH (ref 70–99)
GLUCOSE BLDC GLUCOMTR-MCNC: 184 MG/DL — HIGH (ref 70–99)
GLUCOSE BLDC GLUCOMTR-MCNC: 66 MG/DL — LOW (ref 70–99)
GLUCOSE SERPL-MCNC: 179 MG/DL — HIGH (ref 70–99)
HCT VFR BLD CALC: 34 % — LOW (ref 39–50)
HGB BLD-MCNC: 11.1 G/DL — LOW (ref 13–17)
INR BLD: 1.35 RATIO — HIGH (ref 0.88–1.16)
MAGNESIUM SERPL-MCNC: 1.8 MG/DL — SIGNIFICANT CHANGE UP (ref 1.6–2.6)
MCHC RBC-ENTMCNC: 29.4 PG — SIGNIFICANT CHANGE UP (ref 27–34)
MCHC RBC-ENTMCNC: 32.6 GM/DL — SIGNIFICANT CHANGE UP (ref 32–36)
MCV RBC AUTO: 90.2 FL — SIGNIFICANT CHANGE UP (ref 80–100)
NRBC # BLD: 0 /100 WBCS — SIGNIFICANT CHANGE UP
NRBC # FLD: 0.02 K/UL — HIGH
PHOSPHATE SERPL-MCNC: 3.4 MG/DL — SIGNIFICANT CHANGE UP (ref 2.5–4.5)
PLATELET # BLD AUTO: 108 K/UL — LOW (ref 150–400)
POTASSIUM SERPL-MCNC: 4.1 MMOL/L — SIGNIFICANT CHANGE UP (ref 3.5–5.3)
POTASSIUM SERPL-SCNC: 4.1 MMOL/L — SIGNIFICANT CHANGE UP (ref 3.5–5.3)
PROT SERPL-MCNC: 5.5 G/DL — LOW (ref 6–8.3)
PROTHROM AB SERPL-ACNC: 15.2 SEC — HIGH (ref 10.6–13.6)
RBC # BLD: 3.77 M/UL — LOW (ref 4.2–5.8)
RBC # FLD: 14.2 % — SIGNIFICANT CHANGE UP (ref 10.3–14.5)
SODIUM SERPL-SCNC: 141 MMOL/L — SIGNIFICANT CHANGE UP (ref 135–145)
SPECIMEN SOURCE: SIGNIFICANT CHANGE UP
VANCOMYCIN FLD-MCNC: 7.6 UG/ML — SIGNIFICANT CHANGE UP
WBC # BLD: 13 K/UL — HIGH (ref 3.8–10.5)
WBC # FLD AUTO: 13 K/UL — HIGH (ref 3.8–10.5)

## 2021-06-23 PROCEDURE — 99233 SBSQ HOSP IP/OBS HIGH 50: CPT

## 2021-06-23 PROCEDURE — 99291 CRITICAL CARE FIRST HOUR: CPT | Mod: 25

## 2021-06-23 RX ORDER — NOREPINEPHRINE BITARTRATE/D5W 8 MG/250ML
0.05 PLASTIC BAG, INJECTION (ML) INTRAVENOUS
Qty: 8 | Refills: 0 | Status: DISCONTINUED | OUTPATIENT
Start: 2021-06-23 | End: 2021-06-23

## 2021-06-23 RX ORDER — CARVEDILOL PHOSPHATE 80 MG/1
3.12 CAPSULE, EXTENDED RELEASE ORAL EVERY 12 HOURS
Refills: 0 | Status: DISCONTINUED | OUTPATIENT
Start: 2021-06-23 | End: 2021-06-27

## 2021-06-23 RX ORDER — SODIUM CHLORIDE 9 MG/ML
500 INJECTION, SOLUTION INTRAVENOUS ONCE
Refills: 0 | Status: DISCONTINUED | OUTPATIENT
Start: 2021-06-23 | End: 2021-06-23

## 2021-06-23 RX ORDER — INSULIN LISPRO 100/ML
VIAL (ML) SUBCUTANEOUS EVERY 6 HOURS
Refills: 0 | Status: DISCONTINUED | OUTPATIENT
Start: 2021-06-23 | End: 2021-06-29

## 2021-06-23 RX ADMIN — Medication 1: at 23:22

## 2021-06-23 RX ADMIN — Medication 5 MILLIGRAM(S): at 23:11

## 2021-06-23 RX ADMIN — CLOPIDOGREL BISULFATE 75 MILLIGRAM(S): 75 TABLET, FILM COATED ORAL at 13:23

## 2021-06-23 RX ADMIN — HUMAN INSULIN 11 UNIT(S): 100 INJECTION, SUSPENSION SUBCUTANEOUS at 05:56

## 2021-06-23 RX ADMIN — Medication 1 APPLICATION(S): at 06:00

## 2021-06-23 RX ADMIN — Medication 1 APPLICATION(S): at 17:05

## 2021-06-23 RX ADMIN — HUMAN INSULIN 11 UNIT(S): 100 INJECTION, SUSPENSION SUBCUTANEOUS at 23:11

## 2021-06-23 RX ADMIN — CHLORHEXIDINE GLUCONATE 1 APPLICATION(S): 213 SOLUTION TOPICAL at 13:24

## 2021-06-23 RX ADMIN — CHLORHEXIDINE GLUCONATE 15 MILLILITER(S): 213 SOLUTION TOPICAL at 17:06

## 2021-06-23 RX ADMIN — CHLORHEXIDINE GLUCONATE 15 MILLILITER(S): 213 SOLUTION TOPICAL at 05:55

## 2021-06-23 RX ADMIN — HUMAN INSULIN 11 UNIT(S): 100 INJECTION, SUSPENSION SUBCUTANEOUS at 16:57

## 2021-06-23 RX ADMIN — Medication 5 MILLIGRAM(S): at 11:55

## 2021-06-23 RX ADMIN — Medication 81 MILLIGRAM(S): at 13:23

## 2021-06-23 RX ADMIN — CARVEDILOL PHOSPHATE 3.12 MILLIGRAM(S): 80 CAPSULE, EXTENDED RELEASE ORAL at 17:05

## 2021-06-23 NOTE — PROGRESS NOTE ADULT - SUBJECTIVE AND OBJECTIVE BOX
Patient seen and examined at bedside.    Overnight Events:   Off pressor and bumex gtt, urinating.    Able to follow some command off sedation, remains on ventilator support     REVIEW OF SYSTEMS:  Constitutional:     [x ] negative [ ] fevers [ ] chills [ ] weight loss [ ] weight gain  HEENT:                  [x ] negative [ ] dry eyes [ ] eye irritation [ ] postnasal drip [ ] nasal congestion  CV:                         [ x] negative  [ ] chest pain [ ] orthopnea [ ] palpitations [ ] murmur  Resp:                     [ x] negative [ ] cough [ ] shortness of breath [ ] dyspnea [ ] wheezing [ ] sputum [ ]hemoptysis  GI:                          [ x] negative [ ] nausea [ ] vomiting [ ] diarrhea [ ] constipation [ ] abd pain [ ] dysphagia   :                        [ x] negative [ ] dysuria [ ] nocturia [ ] hematuria [ ] increased urinary frequency  Musculoskeletal: [ x] negative [ ] back pain [ ] myalgias [ ] arthralgias [ ] fracture  Skin:                       [ x] negative [ ] rash [ ] itch  Neurological:        [x ] negative [ ] headache [ ] dizziness [ ] syncope [ ] weakness [ ] numbness  Psychiatric:           [ x] negative [ ] anxiety [ ] depression  Endocrine:            [ x] negative [ ] diabetes [ ] thyroid problem  Heme/Lymph:      [ x] negative [ ] anemia [ ] bleeding problem  Allergic/Immune: [ x] negative [ ] itchy eyes [ ] nasal discharge [ ] hives [ ] angioedema    [ x] All other systems negative  [ ] Unable to assess ROS due to    Current Meds:  aspirin  chewable 81 milliGRAM(s) Oral daily  chlorhexidine 0.12% Liquid 15 milliLiter(s) Oral Mucosa every 12 hours  chlorhexidine 2% Cloths 1 Application(s) Topical daily  clopidogrel Tablet 75 milliGRAM(s) Oral daily  dexMEDEtomidine Infusion 0.2 MICROgram(s)/kG/Hr IV Continuous <Continuous>  dextrose 40% Gel 15 Gram(s) Oral once  dextrose 5%. 1000 milliLiter(s) IV Continuous <Continuous>  dextrose 5%. 1000 milliLiter(s) IV Continuous <Continuous>  dextrose 50% Injectable 25 Gram(s) IV Push once  dextrose 50% Injectable 12.5 Gram(s) IV Push once  dextrose 50% Injectable 25 Gram(s) IV Push once  glucagon  Injectable 1 milliGRAM(s) IntraMuscular once  hydrALAZINE Injectable 5 milliGRAM(s) IV Push every 6 hours PRN  insulin lispro (ADMELOG) corrective regimen sliding scale   SubCutaneous three times a day before meals  insulin NPH human recombinant 11 Unit(s) SubCutaneous every 6 hours  petrolatum Ophthalmic Ointment 1 Application(s) Both EYES two times a day      PAST MEDICAL & SURGICAL HISTORY:  HTN (hypertension)    Diabetes, type I        Vitals:  T(F): 98.8 (06-23), Max: 99 (06-22)  HR: 68 (06-23) (52 - 71)  BP: --  RR: 17 (06-23)  SpO2: 97% (06-23)  I&O's Summary    22 Jun 2021 07:01  -  23 Jun 2021 07:00  --------------------------------------------------------  IN: 1572.2 mL / OUT: 3905 mL / NET: -2332.8 mL        Physical Exam:    NAD, intubated sedated   No JVD   CTABL anteriorly   RRR, no MRG   Soft NT   Trace edema                        11.1   13.00 )-----------( 108      ( 23 Jun 2021 02:21 )             34.0     06-23    141  |  101  |  34<H>  ----------------------------<  179<H>  4.1   |  24  |  2.81<H>    Ca    9.3      23 Jun 2021 02:21  Phos  3.4     06-23  Mg     1.8     06-23    TPro  5.8<L>  /  Alb  3.1<L>  /  TBili  0.8  /  DBili  x   /  AST  2312<H>  /  ALT  1511<H>  /  AlkPhos  99  06-21    PT/INR - ( 23 Jun 2021 02:21 )   PT: 15.2 sec;   INR: 1.35 ratio         PTT - ( 22 Jun 2021 12:06 )  PTT:53.6 sec      Serum Pro-Brain Natriuretic Peptide: 50058 pg/mL (06-20 @ 19:52)          Cardiovascular Testings:       Interpretation of Telemetry: No significant events

## 2021-06-23 NOTE — PROGRESS NOTE ADULT - SUBJECTIVE AND OBJECTIVE BOX
[facility] Division of Kidney Diseases & Hypertension  FOLLOW UP NOTE  [phone]--------------------------------------------------------------------------------  Chief Complaint:Type 2 diabetes mellitus with ketoacidosis without coma      HPI: 85 year old male with PMHx HTN, DM  was brought in by EMS for lethargic and respiratory failure. Per ED note and collateral info from son, patient not feeling well since yesterday and was given 0.4 nitro by son. Pt presented to the ED and was reportedly hypoxic to 60s and lethargic. Mental status waxing and waning, patient was placed on BIPAP then subsequently intubated for airway protection.On admission, labs notable for metabolic acidosis, elevated lactate (pH: 7.02, serum CO2 low at 8 with lactate 11.7) and hyperglycemia. Pt received Vancomycin/Zosyn, 2L IVF, 1 amp sodium bicarbonate and started on Insulin drip. Nephrology team consulted for LINDSEY and acidosis. No previous labs for review on [facility]. On admission, pt with elevated SCr of 2.09 (6/10). Pt seen and examined in ICU. Pt intubated with FIO2: 50% PEEP+8) and sedated. BP stable without any IV vasopressor support.  CXR with bilateral pleural effusions. Review of meds showed pt on Atenolol, Telmisartan, Isosorbide and Colesevelam. Pt on insulin drip for possible DKA & Iv bumex for volume overload      Pt seen & examined. Remains intubated. Sedation turned off for possible extubation today.  FiO2 30% with PEEP 5. Remains off bumex & insulin gtt. UOP 4L in 24 hrs          PAST HISTORY  --------------------------------------------------------------------------------  No significant changes to PMH, PSH, FHx, SHx, unless otherwise noted    ALLERGIES & MEDICATIONS  --------------------------------------------------------------------------------  Allergies    dyes,iv dyes (Unknown)  iodinated radiocontrast agents (Hives)    Intolerances      Standing Inpatient Medications  aspirin  chewable 81 milliGRAM(s) Oral daily  chlorhexidine 0.12% Liquid 15 milliLiter(s) Oral Mucosa every 12 hours  chlorhexidine 2% Cloths 1 Application(s) Topical daily  clopidogrel Tablet 75 milliGRAM(s) Oral daily  dexMEDEtomidine Infusion 0.2 MICROgram(s)/kG/Hr IV Continuous <Continuous>  dextrose 40% Gel 15 Gram(s) Oral once  dextrose 5%. 1000 milliLiter(s) IV Continuous <Continuous>  dextrose 5%. 1000 milliLiter(s) IV Continuous <Continuous>  dextrose 50% Injectable 25 Gram(s) IV Push once  dextrose 50% Injectable 12.5 Gram(s) IV Push once  dextrose 50% Injectable 25 Gram(s) IV Push once  glucagon  Injectable 1 milliGRAM(s) IntraMuscular once  insulin lispro (ADMELOG) corrective regimen sliding scale   SubCutaneous three times a day before meals  insulin NPH human recombinant 11 Unit(s) SubCutaneous every 6 hours  petrolatum Ophthalmic Ointment 1 Application(s) Both EYES two times a day    PRN Inpatient Medications  hydrALAZINE Injectable 5 milliGRAM(s) IV Push every 6 hours PRN      REVIEW OF SYSTEMS  --------------------------------------------------------------------------------  Unobtainable due to medical condition      VITALS/PHYSICAL EXAM  --------------------------------------------------------------------------------  T(C): 37.4 (06-23-21 @ 08:00), Max: 37.4 (06-23-21 @ 08:00)  HR: 66 (06-23-21 @ 10:00) (52 - 69)  BP: --  RR: 13 (06-23-21 @ 10:00) (11 - 21)  SpO2: 100% (06-23-21 @ 10:00) (96% - 100%)  Wt(kg): --        06-22-21 @ 07:01  -  06-23-21 @ 07:00  --------------------------------------------------------  IN: 1572.2 mL / OUT: 4005 mL / NET: -2432.8 mL    06-23-21 @ 07:01  -  06-23-21 @ 11:13  --------------------------------------------------------  IN: 0 mL / OUT: 100 mL / NET: -100 mL      Physical Exam:  	 Gen: Intubated   	HEENT: +vent  	Pulm: mechanical breath sounds, fair air entry, decreased  	CV:  S1S2  	Abd: +BS, soft   	Ext: no bilateral LE edema, IO line on right leg  	Neuro: sedated  	Skin: Warm, without rashes  	Vascular access:     LABS/STUDIES  --------------------------------------------------------------------------------              11.1   13.00 >-----------<  108      [06-23-21 @ 02:21]              34.0     141  |  101  |  34  ----------------------------<  179      [06-23-21 @ 02:21]  4.1   |  24  |  2.81        Ca     9.3     [06-23-21 @ 02:21]      Mg     1.8     [06-23-21 @ 02:21]      Phos  3.4     [06-23-21 @ 02:21]    TPro  5.5  /  Alb  2.6  /  TBili  0.6  /  DBili  0.3  /  AST  244  /  ALT  752  /  AlkPhos  100  [06-23-21 @ 02:25]    PT/INR: PT 15.2 , INR 1.35       [06-23-21 @ 02:21]  PTT: 53.6       [06-22-21 @ 12:06]      Creatinine Trend:  SCr 2.81 [06-23 @ 02:21]  SCr 2.75 [06-22 @ 17:15]  SCr 2.77 [06-22 @ 08:44]  SCr 2.78 [06-22 @ 03:52]  SCr 2.64 [06-21 @ 21:20]    Urinalysis - [06-20-21 @ 16:48]      Color Yellow / Appearance Clear / SG 1.032 / pH 6.5      Gluc >= 1000 mg/dL / Ketone Trace  / Bili Negative / Urobili <2 mg/dL       Blood Moderate / Protein >600 mg/dL / Leuk Est Negative / Nitrite Negative      RBC 52 / WBC 9 / Hyaline 1 / Gran  / Sq Epi  / Non Sq Epi 8 / Bacteria Few    Urine Creatinine 7      [06-21-21 @ 14:43]  Urine Protein 32      [06-21-21 @ 14:43]

## 2021-06-23 NOTE — PROGRESS NOTE ADULT - SUBJECTIVE AND OBJECTIVE BOX
Kai Vidales, [facility]  Internal Medicine  [facility] [id number]  [phone]    Patient is a 85y old  Male who presents with a chief complaint of AMS, Hypoxia (22 Jun 2021 10:23)      SUBJECTIVE / OVERNIGHT EVENTS:    MEDICATIONS  (STANDING):  aspirin  chewable 81 milliGRAM(s) Oral daily  chlorhexidine 0.12% Liquid 15 milliLiter(s) Oral Mucosa every 12 hours  chlorhexidine 2% Cloths 1 Application(s) Topical daily  clopidogrel Tablet 75 milliGRAM(s) Oral daily  dexMEDEtomidine Infusion 0.2 MICROgram(s)/kG/Hr (2.84 mL/Hr) IV Continuous <Continuous>  dextrose 40% Gel 15 Gram(s) Oral once  dextrose 5%. 1000 milliLiter(s) (50 mL/Hr) IV Continuous <Continuous>  dextrose 5%. 1000 milliLiter(s) (100 mL/Hr) IV Continuous <Continuous>  dextrose 50% Injectable 25 Gram(s) IV Push once  dextrose 50% Injectable 12.5 Gram(s) IV Push once  dextrose 50% Injectable 25 Gram(s) IV Push once  glucagon  Injectable 1 milliGRAM(s) IntraMuscular once  insulin lispro (ADMELOG) corrective regimen sliding scale   SubCutaneous three times a day before meals  insulin NPH human recombinant 11 Unit(s) SubCutaneous every 6 hours  petrolatum Ophthalmic Ointment 1 Application(s) Both EYES two times a day    MEDICATIONS  (PRN):  hydrALAZINE Injectable 5 milliGRAM(s) IV Push every 6 hours PRN for SBP >160      PHYSICAL EXAM:  Vital Signs Last 24 Hrs  T(C): 37.1 (23 Jun 2021 04:00), Max: 37.2 (22 Jun 2021 20:00)  T(F): 98.8 (23 Jun 2021 04:00), Max: 99 (22 Jun 2021 20:00)  HR: 68 (23 Jun 2021 07:33) (52 - 71)  BP: --  BP(mean): --  RR: 17 (23 Jun 2021 06:28) (12 - 21)  SpO2: 97% (23 Jun 2021 07:33) (96% - 100%)    CONSTITUTIONAL: NAD, well-developed  EYES: conjunctiva and sclera clear  ENMT: Moist oral mucosa  NECK: Supple, no palpable masses  RESPIRATORY: Normal WOB; lungs are CTA b/l  CARDIOVASCULAR: RRR; S1/S2 present; no m/r/g; No LE edema; Peripheral pulses are 2+ bilaterally  ABDOMEN: Soft, nontender, normoactive BS, no rebound/guarding; No hepatosplenomegaly  MUSCULOSKELETAL:  moving all extremities  NEUROLOGY: awake, A&O to person, place, and time  SKIN: No rashes  ----  I&O's Summary    22 Jun 2021 07:01  -  23 Jun 2021 07:00  --------------------------------------------------------  IN: 1572.2 mL / OUT: 3905 mL / NET: -2332.8 mL      ----  LABS:                        11.1   13.00 )-----------( 108      ( 23 Jun 2021 02:21 )             34.0     ----  06-23    141  |  101  |  34<H>  ----------------------------<  179<H>  4.1   |  24  |  2.81<H>    Ca    9.3      23 Jun 2021 02:21  Phos  3.4     06-23  Mg     1.8     06-23    TPro  5.8<L>  /  Alb  3.1<L>  /  TBili  0.8  /  DBili  x   /  AST  2312<H>  /  ALT  1511<H>  /  AlkPhos  99  06-21    ----  PT/INR - ( 23 Jun 2021 02:21 )   PT: 15.2 sec;   INR: 1.35 ratio         PTT - ( 22 Jun 2021 12:06 )  PTT:53.6 sec  ----      ----    ----    Culture - Sputum (collected 22 Jun 2021 00:46)  Source: .Sputum Sputum  Gram Stain (22 Jun 2021 07:19):    No polymorphonuclear leukocytes per low power field    No Squamous epithelial cells per low power field    No organisms seen per oil power field  Preliminary Report (22 Jun 2021 19:52):    No growth to date.    Culture - Blood (collected 21 Jun 2021 05:31)  Source: .Blood Blood-Peripheral  Preliminary Report (22 Jun 2021 06:01):    No growth to date.    Culture - Blood (collected 20 Jun 2021 18:25)  Source: .Blood Blood-Peripheral  Preliminary Report (21 Jun 2021 19:02):    No growth to date.    Culture - Blood (collected 20 Jun 2021 18:25)  Source: .Blood Blood-Peripheral  Preliminary Report (21 Jun 2021 19:02):    No growth to date.        RADIOLOGY & ADDITIONAL TESTS:  Results Reviewed:   Imaging Personally Reviewed:  Electrocardiogram Personally Reviewed: Kai Vidales, [facility]  Internal Medicine  [facility] [id number]  [phone]    Patient is a 85y old  Male who presents with a chief complaint of AMS, Hypoxia (22 Jun 2021 10:23)      SUBJECTIVE / OVERNIGHT EVENTS:  - overnight, hep gtt turned off at 2100; taken off precedex gtt  - afebrile, HR 50-60s, MAP 70+ off pressors  - UOP 4L w/moderate leakage as well; net (-) 2.3L    MEDICATIONS  (STANDING):  aspirin  chewable 81 milliGRAM(s) Oral daily  chlorhexidine 0.12% Liquid 15 milliLiter(s) Oral Mucosa every 12 hours  chlorhexidine 2% Cloths 1 Application(s) Topical daily  clopidogrel Tablet 75 milliGRAM(s) Oral daily  dexMEDEtomidine Infusion 0.2 MICROgram(s)/kG/Hr (2.84 mL/Hr) IV Continuous <Continuous>  dextrose 40% Gel 15 Gram(s) Oral once  dextrose 5%. 1000 milliLiter(s) (50 mL/Hr) IV Continuous <Continuous>  dextrose 5%. 1000 milliLiter(s) (100 mL/Hr) IV Continuous <Continuous>  dextrose 50% Injectable 25 Gram(s) IV Push once  dextrose 50% Injectable 12.5 Gram(s) IV Push once  dextrose 50% Injectable 25 Gram(s) IV Push once  glucagon  Injectable 1 milliGRAM(s) IntraMuscular once  insulin lispro (ADMELOG) corrective regimen sliding scale   SubCutaneous three times a day before meals  insulin NPH human recombinant 11 Unit(s) SubCutaneous every 6 hours  petrolatum Ophthalmic Ointment 1 Application(s) Both EYES two times a day    MEDICATIONS  (PRN):  hydrALAZINE Injectable 5 milliGRAM(s) IV Push every 6 hours PRN for SBP >160      PHYSICAL EXAM:  Vital Signs Last 24 Hrs  T(C): 37.1 (23 Jun 2021 04:00), Max: 37.2 (22 Jun 2021 20:00)  T(F): 98.8 (23 Jun 2021 04:00), Max: 99 (22 Jun 2021 20:00)  HR: 68 (23 Jun 2021 07:33) (52 - 71)  BP: --  BP(mean): --  RR: 17 (23 Jun 2021 06:28) (12 - 21)  SpO2: 97% (23 Jun 2021 07:33) (96% - 100%)    Constitutional: intubated  EYES: conjunctiva clear  Respiratory: intubated on minimal settings; Breath sounds are clear bilaterally, No wheezing, rales or rhonchi  Cardiovascular: S1 and S2, regular rate and rhythm, no Murmurs, gallops or rubs, no JVD,    Gastrointestinal: Bowel Sounds present, soft, nontender, nondistended, no guarding, no rebound  Extremities: No cyanosis or clubbing; warm to touch  Vascular: 2+ peripheral pulses lower ex  Neurological: awakens to voice; does not follow commands; does not spontaneously open eyes; Pupils are equally reactive to light and symmetrical in size  Skin: RLE w/crusting; no exudates, drainage, or erythema    ----  I&O's Summary    22 Jun 2021 07:01  -  23 Jun 2021 07:00  --------------------------------------------------------  IN: 1572.2 mL / OUT: 3905 mL / NET: -2332.8 mL      ----  LABS:                        11.1   13.00 )-----------( 108      ( 23 Jun 2021 02:21 )             34.0     ----  06-23    141  |  101  |  34<H>  ----------------------------<  179<H>  4.1   |  24  |  2.81<H>    Ca    9.3      23 Jun 2021 02:21  Phos  3.4     06-23  Mg     1.8     06-23    TPro  5.8<L>  /  Alb  3.1<L>  /  TBili  0.8  /  DBili  x   /  AST  2312<H>  /  ALT  1511<H>  /  AlkPhos  99  06-21    ----  PT/INR - ( 23 Jun 2021 02:21 )   PT: 15.2 sec;   INR: 1.35 ratio         PTT - ( 22 Jun 2021 12:06 )  PTT:53.6 sec  ----      ----    ----    Culture - Sputum (collected 22 Jun 2021 00:46)  Source: .Sputum Sputum  Gram Stain (22 Jun 2021 07:19):    No polymorphonuclear leukocytes per low power field    No Squamous epithelial cells per low power field    No organisms seen per oil power field  Preliminary Report (22 Jun 2021 19:52):    No growth to date.    Culture - Blood (collected 21 Jun 2021 05:31)  Source: .Blood Blood-Peripheral  Preliminary Report (22 Jun 2021 06:01):    No growth to date.    Culture - Blood (collected 20 Jun 2021 18:25)  Source: .Blood Blood-Peripheral  Preliminary Report (21 Jun 2021 19:02):    No growth to date.    Culture - Blood (collected 20 Jun 2021 18:25)  Source: .Blood Blood-Peripheral  Preliminary Report (21 Jun 2021 19:02):    No growth to date

## 2021-06-23 NOTE — PROGRESS NOTE ADULT - ASSESSMENT
85 year old male with hx of diabetes who comes in for AMS, found to be hypoxic and in DKA with additional oliguric renal failure, intubated and sedated 6/20 in the ED due to worsening mental status and inability to protect airway and now with STEMI on medical management    Neuro  #AMS, likely metabolic encephalopathy iso hypercapnia and sepsis  - currently sedated on prop  - on minimal vent. setting; wean prop and assess neuro status    Pulm   #hypercapnic respiratory failure and c/f airway protection s/p intubation 6/20/21  -CXR reviewed with bilateral loculated pleural effusions --> confirmed on POCUS, iso oliguric renal failure indicative of volume overload (BNP 50k)  -ABG showing over-correction of initial respiratory acidosis; now w/ ABG: pH 7.48/35/169/27 when RR decreased from 18 to 12  -monitor blood gas and adjust vent settings PRN    Cardiac:   #STEMI in leads V1/V2 w/rising troponin and CKMB  -s/p ASA/plavix load; c/w ASA, Plavix; c/w hep gtt for 48h  -c/f ADHF given pleural effusions and BNP 50k though w/minimal O2 requirements on ventilator (PEEP 8/FiO2 30%); hold BB  - off bumex gtt now  -s/p hydral 2.5mg IVP for BP control    GI  #LFT elevation, likely shock liver given acute rise in LFTs from WNL to 3000/1600 iso hypoperfusion from MI vs. less likely congestive hepatopathy  - INR 1.9, platelets 125  - continue to trend to clearance  - OG tube in place; hold TF for potential extubation today; if unable, then will restart TF and start NPH to bridge off insulin gtt    #c/f C. diff given multiple episodes of diarrhea prior to arrival; however, now pt without any diarrhea; low suspicion for c. diff  - dc precautions    Renal:   #LINDSEY (no previous labs to assess baseline) c/b oliguric renal failure, likely ATN 2/2 hypoperfusion iso MI vs. hypovolemia prior to MI  - Cr uptrending likely iso ATN that is starting to plateau  - HAGMA 2/2 lactic acidosis and component of DKA w/additional respiratory acidosis  - bicarb gtt d/c'ed in unit  - improvement in UOP w/bumex gtt; 5L over 24h  - dc bumex and monitor UOP    Endo  #DKA, resolved  - AG present but w/o BHB or acidosis; AG likely iso renal failure  - continue insulin gtt for now; will transition to subQ later today    ID:  SIRS w/hypothermia, tachycardia, leukocytosis, lactate w/o obvious source of infection; hypothermia resolved, likely was iso hypoperfusion from STEMI  - RLE w/crusted lesions without associated erythema, warmth, or exudates  - BCx pending NGTD, started empirically on vancomycin/zosyn/doxy (6/20/21- 6/21/21)  - dc abx, monitor T curve    Heme/Onc:   -DVT ppx: on heparin gtt 85 year old male with hx of diabetes who comes in for AMS, found to be hypoxic and in DKA with additional oliguric renal failure, intubated and sedated 6/20 in the ED due to worsening mental status and inability to protect airway and now with STEMI on medical management    Neuro  #AMS, likely metabolic encephalopathy iso hypercapnia and sepsis  - off sedation; opens eyes to voice; does not follow commands  - continue on minimal vent. setting    Pulm   #hypercapnic respiratory failure and c/f airway protection s/p intubation 6/20/21  -CXR reviewed with bilateral loculated pleural effusions --> confirmed on POCUS, iso oliguric renal failure indicative of volume overload (BNP 50k)  -ABG showing over-correction of initial respiratory acidosis; now w/ ABG: pH 7.48/35/169/27 when RR decreased from 18 to 12  -monitor blood gas and adjust vent settings PRN    Cardiac:   #STEMI in leads V1/V2 w/rising troponin and CKMB  -s/p ASA/plavix load; c/w ASA, Plavix; c/w hep gtt for 48h  -c/f ADHF given pleural effusions and BNP 50k though w/minimal O2 requirements on ventilator (PEEP 8/FiO2 30%); hold BB  - off bumex gtt now  -s/p hydral 2.5mg IVP for BP control    GI  #LFT elevation, likely shock liver given acute rise in LFTs from WNL to 3000/1600 iso hypoperfusion from MI vs. less likely congestive hepatopathy  - INR 1.9, platelets 125  - continue to trend to clearance  - OG tube in place; hold TF for potential extubation today; if unable, then will restart TF and start NPH to bridge off insulin gtt    #c/f C. diff given multiple episodes of diarrhea prior to arrival; however, now pt without any diarrhea; low suspicion for c. diff  - dc precautions    Renal:   #LINDSEY (no previous labs to assess baseline) c/b oliguric renal failure, likely ATN 2/2 hypoperfusion iso MI vs. hypovolemia prior to MI  - Cr uptrending likely iso ATN that is starting to plateau  - HAGMA 2/2 lactic acidosis and component of DKA w/additional respiratory acidosis  - bicarb gtt d/c'ed in unit  - improvement in UOP w/bumex gtt; 5L over 24h  - dc bumex and monitor UOP    Endo  #DKA, resolved  - AG present but w/o BHB or acidosis; AG likely iso renal failure  - continue insulin gtt for now; will transition to subQ later today    ID:  SIRS w/hypothermia, tachycardia, leukocytosis, lactate w/o obvious source of infection; hypothermia resolved, likely was iso hypoperfusion from STEMI  - RLE w/crusted lesions without associated erythema, warmth, or exudates  - BCx pending NGTD, started empirically on vancomycin/zosyn/doxy (6/20/21- 6/21/21)  - dc abx, monitor T curve    Heme/Onc:   -DVT ppx: on heparin gtt 85 year old male with hx of diabetes who comes in for AMS, found to be hypoxic and in DKA with additional oliguric renal failure, intubated and sedated 6/20 in the ED due to worsening mental status and inability to protect airway and now with STEMI on medical management    Neuro  #AMS, likely metabolic encephalopathy iso hypercapnia and sepsis  - off sedation; opens eyes to voice; does not follow commands  - continue on minimal vent. setting    Pulm   #hypercapnic respiratory failure likely iso metabolic encephalopathy and intubated 2/2 c/f airway protection  - on SBT 6/23 AM w/ABG showing metabolic alkalosis  - vent settings adjusted 10/5 -> 8/5  - possible extubation later today if mental status continues to improve    Cardiac:   #STEMI in leads V1/V2 w/rising troponin and CKMB  -s/p ASA/plavix load; c/w ASA, Plavix; c/w hep gtt for 48h  -c/f ADHF given pleural effusions and BNP 50k though w/minimal O2 requirements on ventilator (PEEP 8/FiO2 30%); hold BB  -off bumex gtt since ~0800 on 6/22   -hydral 2.5mg IVP for BP control    GI  #LFT elevation, likely shock liver given acute rise in LFTs from WNL to 3000/1600 iso hypoperfusion from MI vs. less likely congestive hepatopathy  - INR 1.9, platelets 125  - continue to trend to clearance  - OG tube in place; hold TF for potential extubation today    Renal:   #LINDSEY (no previous labs to assess baseline) c/b oliguric renal failure, likely ATN 2/2 hypoperfusion iso MI vs. hypovolemia prior to MI  - Cr uptrending likely iso ATN that is starting to plateau  - HAGMA 2/2 lactic acidosis  - UOP 4L off bumex for 24h    Endo  #DKA, resolved  - AG present but w/o BHB or acidosis; AG likely iso renal failure  - on NPH 11 Q6H    ID:  SIRS w/hypothermia, tachycardia, leukocytosis, lactate w/o obvious source of infection; hypothermia resolved, likely was iso hypoperfusion from STEMI  - RLE w/crusted lesions without associated erythema, warmth, or exudates  - BCx pending NGTD, started empirically on vancomycin/zosyn/doxy (6/20/21- 6/21/21)  - dc abx, monitor T curve    Heme/Onc:   -DVT ppx: SQH

## 2021-06-23 NOTE — PROGRESS NOTE ADULT - ASSESSMENT
84 yo M with PMH of CAD s/p remote hx of PCI (unclear anatomy), uncontrolled T2DM, and HLD p/w hypoxia/AMS admitted to MICU for DKA on insulin gtt and likely late presentation MI. Intubated for AMS, and MICU course c/b ARF and shock liver- improving     TTE 6/2021  CONCLUSIONS:  1. Mitral annular calcification, otherwise normal mitral  valve. Minimal mitral regurgitation.  2. Calcified trileaflet aortic valve with normal opening.  Mild aortic regurgitation.  3. Normal left ventricular internal dimensions and wall  thicknesses.  4. Severe global left ventricular systolic dysfunction.  Endocardialvisualization enhanced with intravenous  injection of echo contrast (Definity).  5. Mild diastolic dysfunction (Stage I).  6. The right ventricle is not well visualized; grossly  normal right ventricular systolic function.  7. Estimated right ventricular systolic pressure equals 52  mm Hg, assuming right atrial pressure equals 10 mm Hg,  consistent with moderate pulmonary hypertension.  8. Left pleural effusion.    -cont DAPT  with ASA and Plavix   -TTE reviewed, cont heparin gtt for a total of 48 hours, off now   -off bumex gtt, monitor urine output, if subsides, would try IV bumex trial   -troponin has peaked, would not trend it further. Not a candidate for invasive intervention i.e multiorgan failure, contrast allergy.   -appreciate MICU care     Thank you for allowing us to participate in the care of your patient. If you have any questions or concerns please do not hesitate to contact us 24/7.     Reese Sam MD x [phone]  Cardiology Fellow, [facility]  All Cardiology service information can be found 24/7 on [email or web address], password: [email or web address]

## 2021-06-23 NOTE — PROGRESS NOTE ADULT - ASSESSMENT
Pt with LINDSEY and metabolic acidosis    LINDSEY (non oliguric) on possible CKD-  Pt with hemodynamically mediated LINDSEY in the setting of SIRS, STEMI, DKA and medication use (Telmisartan). No previous labs for review. On admission, SCr elevated at 2.09 (6/20). Peaked to 2.78 on 6/21/21. Today remains stable elevated / stable at 2.81.  UA dirty with proteinuria, hematuria, pyuria.   Spot urine TP/CR 4.6.  Currently has a Lilly. Appears euvolemic on exam.  Bumex IV infusion on hold. UOP 4L in 24 hrs. Hold Telmisartan. Monitor labs and urine output. Avoid NSAIDs, ACEI/ARBS, RCA and nephrotoxins. Dose medications as per eGFR.      Metabolic acidosis with concomitant respiratory acidosis-   Pt initially with anion gap metabolic and lactic acidosis in the setting of likely DKA (now resolved) with LINDSEY. On admission, Serum CO2 low at 8 with lactate 11.7 and pH: 7.04. Pt received IV sodium bicarbonate along with management of DKA.  Repeat ABG today with 7.52/37/31. Now with metabolic & respiratory alkalosis. Serum CO2: 23. Respiratory alkalosis on ventilator. Metabolic alkalosis from diuresis. Continue to hold diuretics. Would continue to monitor pH and serum CO2 levels for now.         If you have any questions, please feel free to contact me  Tatiana Chandler  Nephrology Fellow  [phone]  (After 5pm or on weekends please page the on-call fellow)

## 2021-06-24 LAB
A1C WITH ESTIMATED AVERAGE GLUCOSE RESULT: 8 % — HIGH (ref 4–5.6)
ALBUMIN SERPL ELPH-MCNC: 2.8 G/DL — LOW (ref 3.3–5)
ALP SERPL-CCNC: 180 U/L — HIGH (ref 40–120)
ALT FLD-CCNC: 534 U/L — HIGH (ref 4–41)
ANION GAP SERPL CALC-SCNC: 17 MMOL/L — HIGH (ref 7–14)
APTT BLD: 31.9 SEC — SIGNIFICANT CHANGE UP (ref 27–36.3)
AST SERPL-CCNC: 121 U/L — HIGH (ref 4–40)
BILIRUB SERPL-MCNC: 0.9 MG/DL — SIGNIFICANT CHANGE UP (ref 0.2–1.2)
BLOOD GAS ARTERIAL - LYTES,HGB,ICA,LACT RESULT: SIGNIFICANT CHANGE UP
BUN SERPL-MCNC: 49 MG/DL — HIGH (ref 7–23)
CALCIUM SERPL-MCNC: 10 MG/DL — SIGNIFICANT CHANGE UP (ref 8.4–10.5)
CHLORIDE SERPL-SCNC: 97 MMOL/L — LOW (ref 98–107)
CO2 SERPL-SCNC: 27 MMOL/L — SIGNIFICANT CHANGE UP (ref 22–31)
COVID-19 NUCLEOCAPSID GAM AB INTERP: NEGATIVE — SIGNIFICANT CHANGE UP
COVID-19 NUCLEOCAPSID TOTAL GAM ANTIBODY RESULT: 0.08 INDEX — SIGNIFICANT CHANGE UP
COVID-19 SPIKE DOMAIN AB INTERP: NEGATIVE — SIGNIFICANT CHANGE UP
COVID-19 SPIKE DOMAIN ANTIBODY RESULT: 0.4 U/ML — SIGNIFICANT CHANGE UP
CREAT SERPL-MCNC: 2.97 MG/DL — HIGH (ref 0.5–1.3)
ESTIMATED AVERAGE GLUCOSE: 183 MG/DL — HIGH (ref 68–114)
GLUCOSE BLDC GLUCOMTR-MCNC: 134 MG/DL — HIGH (ref 70–99)
GLUCOSE BLDC GLUCOMTR-MCNC: 134 MG/DL — HIGH (ref 70–99)
GLUCOSE BLDC GLUCOMTR-MCNC: 153 MG/DL — HIGH (ref 70–99)
GLUCOSE BLDC GLUCOMTR-MCNC: 171 MG/DL — HIGH (ref 70–99)
GLUCOSE SERPL-MCNC: 191 MG/DL — HIGH (ref 70–99)
HCT VFR BLD CALC: 35.8 % — LOW (ref 39–50)
HGB BLD-MCNC: 11.7 G/DL — LOW (ref 13–17)
INR BLD: 1.37 RATIO — HIGH (ref 0.88–1.16)
MAGNESIUM SERPL-MCNC: 1.8 MG/DL — SIGNIFICANT CHANGE UP (ref 1.6–2.6)
MCHC RBC-ENTMCNC: 29.6 PG — SIGNIFICANT CHANGE UP (ref 27–34)
MCHC RBC-ENTMCNC: 32.7 GM/DL — SIGNIFICANT CHANGE UP (ref 32–36)
MCV RBC AUTO: 90.6 FL — SIGNIFICANT CHANGE UP (ref 80–100)
NRBC # BLD: 0 /100 WBCS — SIGNIFICANT CHANGE UP
NRBC # FLD: 0.03 K/UL — HIGH
PHOSPHATE SERPL-MCNC: 5 MG/DL — HIGH (ref 2.5–4.5)
PLATELET # BLD AUTO: 138 K/UL — LOW (ref 150–400)
POTASSIUM SERPL-MCNC: 3.9 MMOL/L — SIGNIFICANT CHANGE UP (ref 3.5–5.3)
POTASSIUM SERPL-SCNC: 3.9 MMOL/L — SIGNIFICANT CHANGE UP (ref 3.5–5.3)
PROT SERPL-MCNC: 6.2 G/DL — SIGNIFICANT CHANGE UP (ref 6–8.3)
PROTHROM AB SERPL-ACNC: 15.5 SEC — HIGH (ref 10.6–13.6)
RBC # BLD: 3.95 M/UL — LOW (ref 4.2–5.8)
RBC # FLD: 14.5 % — SIGNIFICANT CHANGE UP (ref 10.3–14.5)
SARS-COV-2 IGG+IGM SERPL QL IA: 0.08 INDEX — SIGNIFICANT CHANGE UP
SARS-COV-2 IGG+IGM SERPL QL IA: 0.4 U/ML — SIGNIFICANT CHANGE UP
SARS-COV-2 IGG+IGM SERPL QL IA: NEGATIVE — SIGNIFICANT CHANGE UP
SARS-COV-2 IGG+IGM SERPL QL IA: NEGATIVE — SIGNIFICANT CHANGE UP
SODIUM SERPL-SCNC: 141 MMOL/L — SIGNIFICANT CHANGE UP (ref 135–145)
WBC # BLD: 14.66 K/UL — HIGH (ref 3.8–10.5)
WBC # FLD AUTO: 14.66 K/UL — HIGH (ref 3.8–10.5)

## 2021-06-24 PROCEDURE — 99291 CRITICAL CARE FIRST HOUR: CPT

## 2021-06-24 PROCEDURE — 99233 SBSQ HOSP IP/OBS HIGH 50: CPT

## 2021-06-24 PROCEDURE — 76937 US GUIDE VASCULAR ACCESS: CPT | Mod: 26,59

## 2021-06-24 PROCEDURE — 93925 LOWER EXTREMITY STUDY: CPT | Mod: 26

## 2021-06-24 PROCEDURE — 36573 INSJ PICC RS&I 5 YR+: CPT

## 2021-06-24 RX ORDER — HEPARIN SODIUM 5000 [USP'U]/ML
5000 INJECTION INTRAVENOUS; SUBCUTANEOUS EVERY 8 HOURS
Refills: 0 | Status: DISCONTINUED | OUTPATIENT
Start: 2021-06-24 | End: 2021-06-24

## 2021-06-24 RX ORDER — HEPARIN SODIUM 5000 [USP'U]/ML
4500 INJECTION INTRAVENOUS; SUBCUTANEOUS EVERY 6 HOURS
Refills: 0 | Status: DISCONTINUED | OUTPATIENT
Start: 2021-06-24 | End: 2021-06-30

## 2021-06-24 RX ORDER — HEPARIN SODIUM 5000 [USP'U]/ML
INJECTION INTRAVENOUS; SUBCUTANEOUS
Qty: 25000 | Refills: 0 | Status: DISCONTINUED | OUTPATIENT
Start: 2021-06-24 | End: 2021-06-25

## 2021-06-24 RX ORDER — HUMAN INSULIN 100 [IU]/ML
5 INJECTION, SUSPENSION SUBCUTANEOUS EVERY 6 HOURS
Refills: 0 | Status: DISCONTINUED | OUTPATIENT
Start: 2021-06-24 | End: 2021-06-26

## 2021-06-24 RX ORDER — ACETAMINOPHEN 500 MG
650 TABLET ORAL ONCE
Refills: 0 | Status: COMPLETED | OUTPATIENT
Start: 2021-06-24 | End: 2021-06-24

## 2021-06-24 RX ORDER — HEPARIN SODIUM 5000 [USP'U]/ML
2000 INJECTION INTRAVENOUS; SUBCUTANEOUS EVERY 6 HOURS
Refills: 0 | Status: DISCONTINUED | OUTPATIENT
Start: 2021-06-24 | End: 2021-06-30

## 2021-06-24 RX ADMIN — HUMAN INSULIN 5 UNIT(S): 100 INJECTION, SUSPENSION SUBCUTANEOUS at 17:20

## 2021-06-24 RX ADMIN — HUMAN INSULIN 5 UNIT(S): 100 INJECTION, SUSPENSION SUBCUTANEOUS at 23:19

## 2021-06-24 RX ADMIN — CARVEDILOL PHOSPHATE 3.12 MILLIGRAM(S): 80 CAPSULE, EXTENDED RELEASE ORAL at 05:05

## 2021-06-24 RX ADMIN — HUMAN INSULIN 5 UNIT(S): 100 INJECTION, SUSPENSION SUBCUTANEOUS at 05:46

## 2021-06-24 RX ADMIN — HEPARIN SODIUM 1100 UNIT(S)/HR: 5000 INJECTION INTRAVENOUS; SUBCUTANEOUS at 18:13

## 2021-06-24 RX ADMIN — CARVEDILOL PHOSPHATE 3.12 MILLIGRAM(S): 80 CAPSULE, EXTENDED RELEASE ORAL at 17:19

## 2021-06-24 RX ADMIN — Medication 650 MILLIGRAM(S): at 09:02

## 2021-06-24 RX ADMIN — Medication 1 APPLICATION(S): at 17:19

## 2021-06-24 RX ADMIN — Medication 81 MILLIGRAM(S): at 12:41

## 2021-06-24 RX ADMIN — CHLORHEXIDINE GLUCONATE 1 APPLICATION(S): 213 SOLUTION TOPICAL at 12:43

## 2021-06-24 RX ADMIN — HEPARIN SODIUM 5000 UNIT(S): 5000 INJECTION INTRAVENOUS; SUBCUTANEOUS at 15:43

## 2021-06-24 RX ADMIN — HUMAN INSULIN 5 UNIT(S): 100 INJECTION, SUSPENSION SUBCUTANEOUS at 12:41

## 2021-06-24 RX ADMIN — CHLORHEXIDINE GLUCONATE 15 MILLILITER(S): 213 SOLUTION TOPICAL at 17:19

## 2021-06-24 RX ADMIN — Medication 650 MILLIGRAM(S): at 09:32

## 2021-06-24 RX ADMIN — Medication 1: at 17:20

## 2021-06-24 RX ADMIN — Medication 1 APPLICATION(S): at 05:05

## 2021-06-24 RX ADMIN — CHLORHEXIDINE GLUCONATE 15 MILLILITER(S): 213 SOLUTION TOPICAL at 05:05

## 2021-06-24 RX ADMIN — CLOPIDOGREL BISULFATE 75 MILLIGRAM(S): 75 TABLET, FILM COATED ORAL at 12:40

## 2021-06-24 RX ADMIN — Medication 1: at 05:47

## 2021-06-24 NOTE — PROGRESS NOTE ADULT - ASSESSMENT
85 year old male with hx of diabetes who comes in for AMS, found to be hypoxic and in DKA with additional oliguric renal failure, intubated and sedated 6/20 in the ED due to worsening mental status and inability to protect airway and now with STEMI on medical management    Neuro  #AMS, likely metabolic encephalopathy iso hypercapnia and sepsis  - off sedation; opens eyes to voice; does not follow commands  - continue on minimal vent. setting    Pulm   #hypercapnic respiratory failure likely iso metabolic encephalopathy and intubated 2/2 c/f airway protection  - on SBT 6/23 AM w/ABG showing metabolic alkalosis  - vent settings adjusted 10/5 -> 8/5  - possible extubation later today if mental status continues to improve    Cardiac:   #STEMI in leads V1/V2 w/rising troponin and CKMB  -s/p ASA/plavix load; c/w ASA, Plavix; c/w hep gtt for 48h  -c/f ADHF given pleural effusions and BNP 50k though w/minimal O2 requirements on ventilator (PEEP 8/FiO2 30%); hold BB  -off bumex gtt since ~0800 on 6/22   -hydral 2.5mg IVP for BP control    GI  #LFT elevation, likely shock liver given acute rise in LFTs from WNL to 3000/1600 iso hypoperfusion from MI vs. less likely congestive hepatopathy  - INR 1.9, platelets 125  - continue to trend to clearance  - OG tube in place; hold TF for potential extubation today    Renal:   #LINDSEY (no previous labs to assess baseline) c/b oliguric renal failure, likely ATN 2/2 hypoperfusion iso MI vs. hypovolemia prior to MI  - Cr uptrending likely iso ATN that is starting to plateau  - HAGMA 2/2 lactic acidosis  - UOP 4L off bumex for 24h    Endo  #DKA, resolved  - AG present but w/o BHB or acidosis; AG likely iso renal failure  - on NPH 11 Q6H    ID:  SIRS w/hypothermia, tachycardia, leukocytosis, lactate w/o obvious source of infection; hypothermia resolved, likely was iso hypoperfusion from STEMI  - RLE w/crusted lesions without associated erythema, warmth, or exudates  - BCx pending NGTD, started empirically on vancomycin/zosyn/doxy (6/20/21- 6/21/21)  - dc abx, monitor T curve    Heme/Onc:   -DVT ppx: [facility] 85 year old male with hx of diabetes who comes in for AMS, found to be hypoxic and in DKA with additional oliguric renal failure, intubated and sedated 6/20 in the ED due to worsening mental status and inability to protect airway and now with STEMI on medical management    Neuro  #AMS, likely metabolic encephalopathy iso hypercapnia and metabolic acidosis iso renal failure  - off sedation; opens eyes to voice; does not follow commands  - continues to be on minimal vent. setting    Pulm   #hypercapnic respiratory failure likely iso metabolic encephalopathy and intubated 2/2 c/f airway protection  - on SBT 6/24 AM  - possible extubation later today if mental status continues to improve    Cardiac:   #STEMI in leads V1/V2 w/rising troponin and CKMB  -s/p ASA/plavix load; c/w ASA, Plavix; c/w hep gtt for 48h  -c/f ADHF given pleural effusions and BNP 50k though w/minimal O2 requirements on ventilator (PEEP 8/FiO2 30%); hold BB  -off bumex gtt since ~0800 on 6/22   -no plans for cardiac cath while pt intubated and not to baseline mental status, per cardiology    #L femoral-deep peroneal artery byspass (on prelim [facility] Duplex arterial)  - w/stenosis of bypass tract w/o occlusion; likely chronic given discoloration and skin changes noted on LE; LLE perfused  - will benefit from vascular f/u after resolution of acute    GI  #LFT elevation, likely shock liver given acute rise in LFTs from WNL to 3000/1600 iso hypoperfusion from MI vs. less likely congestive hepatopathy  - INR 1.9, platelets 125  - continue to trend to clearance  - OG tube in place; hold TF for potential extubation today    Renal:   #LINDSEY (no previous labs to assess baseline) c/b oliguric renal failure, likely ATN 2/2 hypoperfusion iso MI vs. hypovolemia prior to MI  - Cr uptrending likely iso ATN that is starting to plateau  - HAGMA 2/2 lactic acidosis  - UOP 2.3L off bumex for 24h; level of UOP iso post-ATN diuresis    Endo  #DKA, resolved  - AG present but w/o BHB or acidosis; AG likely iso renal failure  - on NPH 11 Q6H; or 5 Q6H when NPO; requirements based off insulin gtt    ID:  SIRS w/hypothermia, tachycardia, leukocytosis, lactate w/o obvious source of infection; hypothermia resolved, likely was iso hypoperfusion from STEMI  - RLE w/crusted lesions without associated erythema, warmth, or exudates  - BCx pending NGTD, started empirically on vancomycin/zosyn/doxy (6/20/21- 6/21/21)  - dc abx, monitor T curve    Heme/Onc:   -DVT ppx: [facility] 85 year old male with hx of diabetes who comes in for AMS, found to be hypoxic and in DKA with additional oliguric renal failure, intubated and sedated 6/20 in the ED due to worsening mental status and inability to protect airway and now with STEMI on medical management    Neuro  #AMS, likely metabolic encephalopathy iso hypercapnia and metabolic acidosis iso renal failure  - off sedation; opens eyes to voice; does not follow commands  - continues to be on minimal vent. setting    Pulm   #hypercapnic respiratory failure likely iso metabolic encephalopathy and intubated 2/2 c/f airway protection  - on SBT 6/24 AM  - possible extubation later today if mental status continues to improve    Cardiac:   #STEMI in leads V1/V2 w/rising troponin and CKMB  -s/p ASA/plavix load; c/w ASA, Plavix; c/w hep gtt for 48h  -c/f ADHF given pleural effusions and BNP 50k though w/minimal O2 requirements on ventilator (PEEP 8/FiO2 30%); hold BB  -off bumex gtt since ~0800 on 6/22   -no plans for cardiac cath while pt intubated and not to baseline mental status, per cardiology    #L femoral-deep peroneal artery byspass (on prelim [facility] Duplex arterial)  - w/stenosis of bypass tract w/o occlusion; likely chronic given discoloration and skin changes noted on LE; LLE perfused  - will benefit from vascular f/u after resolution of acute    GI  #LFT elevation, likely shock liver given acute rise in LFTs from WNL to 3000/1600 iso hypoperfusion from MI vs. less likely congestive hepatopathy  - INR 1.9, platelets 125  - continue to trend to clearance  - OG tube in place; hold TF for potential extubation today    Renal:   #LINDSEY (no previous labs to assess baseline) c/b oliguric renal failure, likely ATN 2/2 hypoperfusion iso MI vs. hypovolemia prior to MI  - Cr uptrending likely iso ATN that is starting to plateau  - HAGMA 2/2 lactic acidosis  - UOP 2.3L off bumex for 24h; level of UOP iso post-ATN diuresis    #metabolic alkalosis, likely contraction alkalosis  - HCO3 27 today, net (-) 6.7L iso persistent diarrhea prior to admission  - will consider starting IVF    Endo  #DKA, resolved  - AG present but w/o BHB or acidosis; AG likely iso renal failure  - on NPH 11 Q6H; or 5 Q6H when NPO; requirements based off insulin gtt    ID:  SIRS w/hypothermia, tachycardia, leukocytosis, lactate w/o obvious source of infection; hypothermia resolved, likely was iso hypoperfusion from STEMI  - RLE w/crusted lesions without associated erythema, warmth, or exudates  - BCx pending NGTD, started empirically on vancomycin/zosyn/doxy (6/20/21- 6/21/21)  - off abx, monitor T curve    Heme/Onc:   -DVT ppx: [facility]

## 2021-06-24 NOTE — PROCEDURE NOTE - NSPROCNAME_GEN_A_CORE
Peripheral Line Insertion
Central Line Insertion
Arterial Puncture/Cannulation
Feeding Tube Placement

## 2021-06-24 NOTE — CONSULT NOTE ADULT - ASSESSMENT
85 year old male with extensive PMH presenting with AMS and MICU course complicated by poor mental status and not waking up, duplex of bypass showing severe stenosis    Plan:  - Patient will likely need an angiogram of LE after waking up and resolution of cardiac problem  - Patient awaiting a heart cath after waking up    Discussed with vascular fellow, Dr Ugarte, on behalf of Dr Aj    Vascular Surgery [id number]

## 2021-06-24 NOTE — CONSULT NOTE ADULT - SUBJECTIVE AND OBJECTIVE BOX
SURGERY CONSULT NOTE     HPI: 85 year old male with unknown PMH presented to the hospital with AMS, found to be in DKA, LINDSEY and development of  cardiogenic shock. Now improving, not yet awake from sedation. He was noted to have skin changes on bilateral lower extremities that per son is normal. He has had wounds on bilateral extremities for one month. Patient previously had a femoral to DP bypass done at a different hospital about one decade ago, he does not follow with a vascular surgeon. Duplex of bypass showing severe stenosis    PMHx: HTN (hypertension)    Diabetes, type I      PSHx:   Medications (inpatient): aspirin  chewable 81 milliGRAM(s) Oral daily  carvedilol 3.125 milliGRAM(s) Oral every 12 hours  chlorhexidine 0.12% Liquid 15 milliLiter(s) Oral Mucosa every 12 hours  chlorhexidine 2% Cloths 1 Application(s) Topical daily  clopidogrel Tablet 75 milliGRAM(s) Oral daily  dextrose 40% Gel 15 Gram(s) Oral once  dextrose 5%. 1000 milliLiter(s) IV Continuous <Continuous>  dextrose 5%. 1000 milliLiter(s) IV Continuous <Continuous>  dextrose 50% Injectable 25 Gram(s) IV Push once  dextrose 50% Injectable 12.5 Gram(s) IV Push once  dextrose 50% Injectable 25 Gram(s) IV Push once  glucagon  Injectable 1 milliGRAM(s) IntraMuscular once  heparin  Infusion.  Unit(s)/Hr IV Continuous <Continuous>  insulin lispro (ADMELOG) corrective regimen sliding scale   SubCutaneous every 6 hours  insulin NPH human recombinant 5 Unit(s) SubCutaneous every 6 hours  petrolatum Ophthalmic Ointment 1 Application(s) Both EYES two times a day    Medications (PRN):heparin   Injectable 4500 Unit(s) IV Push every 6 hours PRN  heparin   Injectable 2000 Unit(s) IV Push every 6 hours PRN  hydrALAZINE Injectable 5 milliGRAM(s) IV Push every 6 hours PRN    Allergies: dyes,iv dyes (Unknown)  iodinated radiocontrast agents (Hives)  (Intolerances: )  Social Hx:   Family Hx:     Physical Exam  T(C): 37.6  HR: 81 (66 - 87)  BP: 111/57 (111/57 - 169/77)  RR: 20 (15 - 20)  SpO2: 100% (100% - 100%)  Tmax: T(C): , Max: 38.1 (06-24-21 @ 08:00)    06-23-21  -  06-24-21  --------------------------------------------------------  IN:    Glucerna: 400 mL  Total IN: 400 mL    OUT:    Dexmedetomidine: 0 mL    Indwelling Catheter - Urethral (mL): 2865 mL  Total OUT: 2865 mL    Total NET: -2465 mL      06-24-21  -  06-24-21  --------------------------------------------------------  IN:    Glucerna: 150 mL    Heparin Infusion: 11 mL  Total IN: 161 mL    OUT:    Indwelling Catheter - Urethral (mL): 1200 mL  Total OUT: 1200 mL    Total NET: -1039 mL        General: well developed, well nourished, NAD  Neuro: asleep  Respiratory: iintubated  CVS: regular rate and rhythm  Abdomen: soft, nontender, nondistended  Extremities: skin changes bilaterally, wounds on lateral legs shallow, no drainage, no evidence of infection  Vascular: L Palpable femoral pulse, palpable bypass pulse, dopplerable DP R palpable femoral, dopplerable DP and PT  Skin: warm, dry, appropriate color    Labs:                        11.7   14.66 )-----------( 138      ( 24 Jun 2021 01:11 )             35.8     PT/INR - ( 24 Jun 2021 01:11 )   PT: 15.5 sec;   INR: 1.37 ratio         PTT - ( 24 Jun 2021 01:11 )  PTT:31.9 sec  06-24    141  |  97<L>  |  49<H>  ----------------------------<  191<H>  3.9   |  27  |  2.97<H>    Ca    10.0      24 Jun 2021 01:11  Phos  5.0     06-24  Mg     1.8     06-24    TPro  6.2  /  Alb  2.8<L>  /  TBili  0.9  /  DBili  x   /  AST  121<H>  /  ALT  534<H>  /  AlkPhos  180<H>  06-24      ABG - ( 24 Jun 2021 01:11 )  pH, Arterial: 7.54  pH, Blood: x     /  pCO2: 38    /  pO2: 149   / HCO3: 33    / Base Excess: 8.9   /  SaO2: 99.5                  Imaging and other studies:    < from: [facility] Duplex Arterial Lower Ext, Bilateral. (06.24.21 @ 09:35) >  Right Findings: --Right external iliac artery:  Patent  vessel. Normal velocities with biphasic waveforms.  Mild  heterogeneous plaque present.  --Right common femoral artery:  Patent vessel. Normal  velocities with biphasic waveforms.  Mild heterogeneous  plaque present.  --Right deep femoral artery:  Patent vessel. Normal  velocities with biphasic waveforms.  Mild heterogeneous  plaque present.  --Right superficial femoral artery: Patent vessel. Normal  velocities with biphasic waveforms.  Mild heterogeneous  plaque present.  --Right popliteal artery:  Patent vessel. Normal  velocities with biphasic waveforms.  Mild heterogeneous  plaque present.  --The anterior tibial artery appear patent.  Mild  heterogeneous plaque present.  There is one vessel runoff  to the distal right ankle noted by color Doppler. The  posterior tibial, and peroneal arteries not visualized.  Left Findings: --Left external iliac artery:  Patent  vessel.  Normal velocities with biphasic waveforms.  Mild  heterogeneous plaque present.  --Left common femoral artery:  Patent vessel.  Normal  velocities with biphasic waveforms.  Mild heterogeneous  plaque present.  --Left deep femoral artery:  Patent vessel.  Normal  velocities with biphasic waveforms.  Mild heterogenous  plaque present.  --Left superficial femoral artery: Completely occluded. No  flow noted on color and spectral Doppler analyses. The  hypoechoic nature of the occlusive material within the  vessel suggests that the vessel is completely thrombosed.  --Left popliteal artery:  Completely occluded. No flow  noted on color and spectral Doppler analyses. The  hypoechoic nature of the occlusive material within the  vessel suggests that the vessel is completely thrombosed.  --The infrapopliteal arteries (anterior tibial, posterior  tibial, and peroneal arteries) not well visualized.  ------------------------------------------------------------------------  Summary/Impressions:  1. No flow visualized in the right posterior tibial and  peroneal arteries.  2. 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 Left runoff vessels not  well visualized.    < end of copied text >

## 2021-06-24 NOTE — PROGRESS NOTE ADULT - SUBJECTIVE AND OBJECTIVE BOX
Kai Vidales [facility]  Internal Medicine  [facility] [address]  [phone]    Patient is a 85y old  Male who presents with a chief complaint of AMS, Hypoxia (24 Jun 2021 07:04)      SUBJECTIVE / OVERNIGHT EVENTS:    MEDICATIONS  (STANDING):  aspirin  chewable 81 milliGRAM(s) Oral daily  carvedilol 3.125 milliGRAM(s) Oral every 12 hours  chlorhexidine 0.12% Liquid 15 milliLiter(s) Oral Mucosa every 12 hours  chlorhexidine 2% Cloths 1 Application(s) Topical daily  clopidogrel Tablet 75 milliGRAM(s) Oral daily  dextrose 40% Gel 15 Gram(s) Oral once  dextrose 5%. 1000 milliLiter(s) (50 mL/Hr) IV Continuous <Continuous>  dextrose 5%. 1000 milliLiter(s) (100 mL/Hr) IV Continuous <Continuous>  dextrose 50% Injectable 25 Gram(s) IV Push once  dextrose 50% Injectable 12.5 Gram(s) IV Push once  dextrose 50% Injectable 25 Gram(s) IV Push once  glucagon  Injectable 1 milliGRAM(s) IntraMuscular once  insulin lispro (ADMELOG) corrective regimen sliding scale   SubCutaneous every 6 hours  insulin NPH human recombinant 5 Unit(s) SubCutaneous every 6 hours  petrolatum Ophthalmic Ointment 1 Application(s) Both EYES two times a day    MEDICATIONS  (PRN):  hydrALAZINE Injectable 5 milliGRAM(s) IV Push every 6 hours PRN for SBP >160      PHYSICAL EXAM:  Vital Signs Last 24 Hrs  T(C): 37.8 (24 Jun 2021 04:00), Max: 37.8 (24 Jun 2021 04:00)  T(F): 100 (24 Jun 2021 04:00), Max: 100 (24 Jun 2021 04:00)  HR: 83 (24 Jun 2021 06:24) (66 - 87)  BP: 149/72 (24 Jun 2021 06:00) (130/58 - 162/74)  BP(mean): 92 (24 Jun 2021 06:00) (72 - 99)  RR: 19 (24 Jun 2021 06:00) (0 - 20)  SpO2: 100% (24 Jun 2021 06:24) (100% - 100%)    CONSTITUTIONAL: NAD, well-developed  EYES: conjunctiva and sclera clear  ENMT: Moist oral mucosa  NECK: Supple, no palpable masses  RESPIRATORY: Normal WOB; lungs are CTA b/l  CARDIOVASCULAR: RRR; S1/S2 present; no m/r/g; No LE edema; Peripheral pulses are 2+ bilaterally  ABDOMEN: Soft, nontender, normoactive BS, no rebound/guarding; No hepatosplenomegaly  MUSCULOSKELETAL:  moving all extremities  NEUROLOGY: awake, A&O to person, place, and time  SKIN: No rashes  ----  I&O's Summary    23 Jun 2021 07:01  -  24 Jun 2021 07:00  --------------------------------------------------------  IN: 400 mL / OUT: 2715 mL / NET: -2315 mL      ----  LABS:                        11.7   14.66 )-----------( 138      ( 24 Jun 2021 01:11 )             35.8     ----  06-24    141  |  97<L>  |  49<H>  ----------------------------<  191<H>  3.9   |  27  |  2.97<H>    Ca    10.0      24 Jun 2021 01:11  Phos  5.0     06-24  Mg     1.8     06-24    TPro  6.2  /  Alb  2.8<L>  /  TBili  0.9  /  DBili  x   /  AST  121<H>  /  ALT  534<H>  /  AlkPhos  180<H>  06-24    ----  PT/INR - ( 24 Jun 2021 01:11 )   PT: 15.5 sec;   INR: 1.37 ratio         PTT - ( 24 Jun 2021 01:11 )  PTT:31.9 sec  ----      ----    ----    Culture - Sputum (collected 21 Jun 2021 19:31)  Source: .Sputum Sputum  Gram Stain (22 Jun 2021 07:19):    No polymorphonuclear leukocytes per low power field    No Squamous epithelial cells per low power field    No organisms seen per oil power field  Final Report (23 Jun 2021 18:23):    No growth at 48 hours    Normal Respiratory Krystyna absent        RADIOLOGY & ADDITIONAL TESTS:  Results Reviewed:   Imaging Personally Reviewed:  Electrocardiogram Personally Reviewed: Kai Vidales, [facility]  Internal Medicine  [facility] [address]  [phone]    Patient is a 85y old  Male who presents with a chief complaint of AMS, Hypoxia (24 Jun 2021 07:04)      SUBJECTIVE / OVERNIGHT EVENTS:  - overnight, no events  - TF held for SBT; on CPAP 8/5, 30%  - this AM, T 100.6  - UOP 2.7L, net (-) 2.3L; LOS net (-) 6.7L    MEDICATIONS  (STANDING):  aspirin  chewable 81 milliGRAM(s) Oral daily  carvedilol 3.125 milliGRAM(s) Oral every 12 hours  chlorhexidine 0.12% Liquid 15 milliLiter(s) Oral Mucosa every 12 hours  chlorhexidine 2% Cloths 1 Application(s) Topical daily  clopidogrel Tablet 75 milliGRAM(s) Oral daily  dextrose 40% Gel 15 Gram(s) Oral once  dextrose 5%. 1000 milliLiter(s) (50 mL/Hr) IV Continuous <Continuous>  dextrose 5%. 1000 milliLiter(s) (100 mL/Hr) IV Continuous <Continuous>  dextrose 50% Injectable 25 Gram(s) IV Push once  dextrose 50% Injectable 12.5 Gram(s) IV Push once  dextrose 50% Injectable 25 Gram(s) IV Push once  glucagon  Injectable 1 milliGRAM(s) IntraMuscular once  insulin lispro (ADMELOG) corrective regimen sliding scale   SubCutaneous every 6 hours  insulin NPH human recombinant 5 Unit(s) SubCutaneous every 6 hours  petrolatum Ophthalmic Ointment 1 Application(s) Both EYES two times a day    MEDICATIONS  (PRN):  hydrALAZINE Injectable 5 milliGRAM(s) IV Push every 6 hours PRN for SBP >160      PHYSICAL EXAM:  Vital Signs Last 24 Hrs  T(C): 37.8 (24 Jun 2021 04:00), Max: 37.8 (24 Jun 2021 04:00)  T(F): 100 (24 Jun 2021 04:00), Max: 100 (24 Jun 2021 04:00)  HR: 83 (24 Jun 2021 06:24) (66 - 87)  BP: 149/72 (24 Jun 2021 06:00) (130/58 - 162/74)  BP(mean): 92 (24 Jun 2021 06:00) (72 - 99)  RR: 19 (24 Jun 2021 06:00) (0 - 20)  SpO2: 100% (24 Jun 2021 06:24) (100% - 100%)    Constitutional: intubated, off sedation  EYES: conjunctiva clear  Respiratory: intubated on minimal settings; Breath sounds are clear bilaterally, No wheezing, rales or rhonchi  Cardiovascular: S1 and S2, regular rate and rhythm, no Murmurs, gallops or rubs, no JVD,    Gastrointestinal: Bowel Sounds present, soft, nontender, nondistended, no guarding, no rebound  Extremities: No cyanosis or clubbing; warm to touch  Vascular: 2+ peripheral pulses lower ex  Neurological: awakens to voice; daily improvement in neuro status noted; does not follow commands; does not spontaneously open eyes; Pupils are equally reactive to light and symmetrical in size  Skin: RLE w/crusting; no exudates, drainage, or erythema    ----  I&O's Summary    23 Jun 2021 07:01  -  24 Jun 2021 07:00  --------------------------------------------------------  IN: 400 mL / OUT: 2715 mL / NET: -2315 mL      ----  LABS:                        11.7   14.66 )-----------( 138      ( 24 Jun 2021 01:11 )             35.8     ----  06-24    141  |  97<L>  |  49<H>  ----------------------------<  191<H>  3.9   |  27  |  2.97<H>    Ca    10.0      24 Jun 2021 01:11  Phos  5.0     06-24  Mg     1.8     06-24    TPro  6.2  /  Alb  2.8<L>  /  TBili  0.9  /  DBili  x   /  AST  121<H>  /  ALT  534<H>  /  AlkPhos  180<H>  06-24    ----  PT/INR - ( 24 Jun 2021 01:11 )   PT: 15.5 sec;   INR: 1.37 ratio         PTT - ( 24 Jun 2021 01:11 )  PTT:31.9 sec  ----      ----    ----    Culture - Sputum (collected 21 Jun 2021 19:31)  Source: .Sputum Sputum  Gram Stain (22 Jun 2021 07:19):    No polymorphonuclear leukocytes per low power field    No Squamous epithelial cells per low power field    No organisms seen per oil power field  Final Report (23 Jun 2021 18:23):    No growth at 48 hours    Normal Respiratory Krystyna absent

## 2021-06-24 NOTE — PROGRESS NOTE ADULT - ASSESSMENT
Pt with LINDSEY and metabolic acidosis    LINDSEY (non oliguric)/ATN on possible CKD-  Pt with hemodynamically mediated LINDSEY/ATN in the setting of SIRS, STEMI, DKA and medication use (Telmisartan). No previous labs for review. On admission, SCr elevated at 2.09 (6/20). Peaked to 2.78 on 6/21/21. Today remains stable elevated / stable at 2.9.  UA dirty with proteinuria, hematuria, pyuria.   Spot urine TP/CR 4.6.  Currently has a Lilly. Appears euvolemic on exam.  Bumex IV infusion on hold. Currently with brisk osmotic & post-ATN diuresis. UOP 2.8L in 24 hrs. Continue to hold Telmisartan. Monitor labs and urine output. Avoid NSAIDs, ACEI/ARBS, RCA and nephrotoxins. Dose medications as per eGFR.      Metabolic alkalosis- likely from volume contraction   Pt initially with anion gap metabolic and lactic acidosis in the setting of likely DKA (now resolved) with LINDSEY. On admission, Serum CO2 low at 8 with lactate 11.7 and pH: 7.04. Pt received IV sodium bicarbonate along with management of DKA.  Repeat ABG today with 7.54/38/33. Now with metabolic & respiratory alkalosis. Serum CO2: 27. Respiratory alkalosis on ventilator. Metabolic alkalosis from diuresis. Continue to hold diuretics. Would continue to monitor pH and serum CO2 levels for now.         If you have any questions, please feel free to contact me  Tatiana Chandler  Nephrology Fellow  [phone]  (After 5pm or on weekends please page the on-call fellow)   Pt with LINDSEY and metabolic acidosis    LINDSEY (non oliguric)/ATN on possible CKD-  Pt with hemodynamically mediated LINDSEY/ATN in the setting of SIRS, STEMI, DKA and medication use (Telmisartan). No previous labs for review. On admission, SCr elevated at 2.09 (6/20). Peaked to 2.78 on 6/21/21. Today remains stable elevated / stable at 2.9.  UA with proteinuria, hematuria, pyuria.   Spot urine TP/CR 4.6.  Currently has a Lilly. Appears euvolemic on exam.  Bumex IV infusion on hold. UOP 2.8L in 24 hrs. Continue to hold Telmisartan. Monitor labs and urine output. Avoid NSAIDs, ACEI/ARBS, RCA and nephrotoxins. Dose medications as per eGFR.      Metabolic alkalosis- likely from volume contraction   Pt initially with anion gap metabolic and lactic acidosis in the setting of likely DKA (now resolved) with LINDSEY. On admission, Serum CO2 low at 8 with lactate 11.7 and pH: 7.04. Pt received IV sodium bicarbonate along with management of DKA.  Repeat ABG today with 7.54/38/33. Now with metabolic & respiratory alkalosis. Serum CO2: 27. Respiratory alkalosis on ventilator. Metabolic alkalosis from diuresis. Continue to hold diuretics. Would continue to monitor pH and serum CO2 levels for now.     If you have any questions, please feel free to contact me  Tatiana Chandler  Nephrology Fellow  [phone]  (After 5pm or on weekends please page the on-call fellow)

## 2021-06-24 NOTE — PROGRESS NOTE ADULT - SUBJECTIVE AND OBJECTIVE BOX
Patient seen and examined at bedside.    Overnight Events:   CPAP trial in AM. adequate UO off bumex gtt. awake     REVIEW OF SYSTEMS:  [x ] Unable to assess ROS due to intubated     Current Meds:  aspirin  chewable 81 milliGRAM(s) Oral daily  carvedilol 3.125 milliGRAM(s) Oral every 12 hours  chlorhexidine 0.12% Liquid 15 milliLiter(s) Oral Mucosa every 12 hours  chlorhexidine 2% Cloths 1 Application(s) Topical daily  clopidogrel Tablet 75 milliGRAM(s) Oral daily  dextrose 40% Gel 15 Gram(s) Oral once  dextrose 5%. 1000 milliLiter(s) IV Continuous <Continuous>  dextrose 5%. 1000 milliLiter(s) IV Continuous <Continuous>  dextrose 50% Injectable 25 Gram(s) IV Push once  dextrose 50% Injectable 12.5 Gram(s) IV Push once  dextrose 50% Injectable 25 Gram(s) IV Push once  glucagon  Injectable 1 milliGRAM(s) IntraMuscular once  hydrALAZINE Injectable 5 milliGRAM(s) IV Push every 6 hours PRN  insulin lispro (ADMELOG) corrective regimen sliding scale   SubCutaneous every 6 hours  insulin NPH human recombinant 5 Unit(s) SubCutaneous every 6 hours  petrolatum Ophthalmic Ointment 1 Application(s) Both EYES two times a day      PAST MEDICAL & SURGICAL HISTORY:  HTN (hypertension)    Diabetes, type I        Vitals:  T(F): 100 (06-24), Max: 100 (06-24)  HR: 83 (06-24) (66 - 87)  BP: 149/72 (06-24) (130/58 - 162/74)  RR: 19 (06-24)  SpO2: 100% (06-24)  I&O's Summary    23 Jun 2021 07:01  -  24 Jun 2021 07:00  --------------------------------------------------------  IN: 400 mL / OUT: 2715 mL / NET: -2315 mL        Physical Exam:  NAD, intubated but awake   No JVD   CTABL anteriorly   RRR, no MRG   Soft NT   Trace edema                           11.7   14.66 )-----------( 138      ( 24 Jun 2021 01:11 )             35.8     06-24    141  |  97<L>  |  49<H>  ----------------------------<  191<H>  3.9   |  27  |  2.97<H>    Ca    10.0      24 Jun 2021 01:11  Phos  5.0     06-24  Mg     1.8     06-24    TPro  6.2  /  Alb  2.8<L>  /  TBili  0.9  /  DBili  x   /  AST  121<H>  /  ALT  534<H>  /  AlkPhos  180<H>  06-24    PT/INR - ( 24 Jun 2021 01:11 )   PT: 15.5 sec;   INR: 1.37 ratio         PTT - ( 24 Jun 2021 01:11 )  PTT:31.9 sec      Serum Pro-Brain Natriuretic Peptide: 57420 pg/mL (06-20 @ 19:52)          Cardiovascular Testings:       Interpretation of Telemetry: no significant events

## 2021-06-24 NOTE — PROCEDURE NOTE - NSSITEPREP_SKIN_A_CORE
chlorhexidine
chlorhexidine
Adherence to aseptic technique: hand hygiene prior to donning barriers (gown, gloves), don cap and mask, sterile drape over patient
alcohol/chlorhexidine

## 2021-06-24 NOTE — PROGRESS NOTE ADULT - SUBJECTIVE AND OBJECTIVE BOX
[facility] Division of Kidney Diseases & Hypertension  FOLLOW UP NOTE  [phone]--------------------------------------------------------------------------------  Chief Complaint:Type 2 diabetes mellitus with ketoacidosis without coma      HPI: 85 year old male with PMHx HTN, DM  was brought in by EMS for lethargic and respiratory failure. Per ED note and collateral info from son, patient not feeling well since yesterday and was given 0.4 nitro by son. Pt presented to the ED and was reportedly hypoxic to 60s and lethargic. Mental status waxing and waning, patient was placed on BIPAP then subsequently intubated for airway protection.On admission, labs notable for metabolic acidosis, elevated lactate (pH: 7.02, serum CO2 low at 8 with lactate 11.7) and hyperglycemia. Pt received Vancomycin/Zosyn, 2L IVF, 1 amp sodium bicarbonate and started on Insulin drip. Nephrology team consulted for LINDSEY and acidosis. No previous labs for review on [facility]. On admission, pt with elevated SCr of 2.09 (6/10). Pt seen and examined in ICU. Pt intubated with FIO2: 50% PEEP+8) and sedated. BP stable without any IV vasopressor support.  CXR with bilateral pleural effusions. Review of meds showed pt on Atenolol, Telmisartan, Isosorbide and Colesevelam. Pt on insulin drip for possible DKA & Iv bumex for volume overload      Pt seen & examined. Remains intubated. Sedation turned off for possible extubation today. Pt poorly responsive. Failed CPAP trials & put back on AC mode. FiO2 30% with PEEP 5. Remains off bumex & insulin gtt. UOP2.8L in 24 hrs.         PAST HISTORY  --------------------------------------------------------------------------------  No significant changes to PMH, PSH, FHx, SHx, unless otherwise noted    ALLERGIES & MEDICATIONS  --------------------------------------------------------------------------------  Allergies    dyes,iv dyes (Unknown)  iodinated radiocontrast agents (Hives)    Intolerances      Standing Inpatient Medications  aspirin  chewable 81 milliGRAM(s) Oral daily  carvedilol 3.125 milliGRAM(s) Oral every 12 hours  chlorhexidine 0.12% Liquid 15 milliLiter(s) Oral Mucosa every 12 hours  chlorhexidine 2% Cloths 1 Application(s) Topical daily  clopidogrel Tablet 75 milliGRAM(s) Oral daily  dextrose 40% Gel 15 Gram(s) Oral once  dextrose 5%. 1000 milliLiter(s) IV Continuous <Continuous>  dextrose 5%. 1000 milliLiter(s) IV Continuous <Continuous>  dextrose 50% Injectable 25 Gram(s) IV Push once  dextrose 50% Injectable 12.5 Gram(s) IV Push once  dextrose 50% Injectable 25 Gram(s) IV Push once  glucagon  Injectable 1 milliGRAM(s) IntraMuscular once  insulin lispro (ADMELOG) corrective regimen sliding scale   SubCutaneous every 6 hours  insulin NPH human recombinant 5 Unit(s) SubCutaneous every 6 hours  petrolatum Ophthalmic Ointment 1 Application(s) Both EYES two times a day    PRN Inpatient Medications  hydrALAZINE Injectable 5 milliGRAM(s) IV Push every 6 hours PRN      REVIEW OF SYSTEMS  --------------------------------------------------------------------------------  Unable to obtain    VITALS/PHYSICAL EXAM  --------------------------------------------------------------------------------  T(C): 38.1 (06-24-21 @ 08:00), Max: 38.1 (06-24-21 @ 08:00)  HR: 80 (06-24-21 @ 08:00) (66 - 87)  BP: 169/77 (06-24-21 @ 08:00) (130/58 - 169/77)  RR: 17 (06-24-21 @ 08:00) (0 - 20)  SpO2: 100% (06-24-21 @ 08:00) (100% - 100%)  Wt(kg): --        06-23-21 @ 07:01  -  06-24-21 @ 07:00  --------------------------------------------------------  IN: 400 mL / OUT: 2865 mL / NET: -2465 mL    06-24-21 @ 07:01  -  06-24-21 @ 09:35  --------------------------------------------------------  IN: 0 mL / OUT: 225 mL / NET: -225 mL      Physical Exam:  	 Gen: Intubated   	HEENT: +vent  	Pulm: mechanical breath sounds, fair air entry, decreased  	CV:  S1S2  	Abd: +BS, soft   	Ext: no bilateral LE edema, IO line on right leg  	Neuro: sedated  	Skin: Warm, without rashes  	Vascular access:               Vijaya    LABS/STUDIES  --------------------------------------------------------------------------------              11.7   14.66 >-----------<  138      [06-24-21 @ 01:11]              35.8     141  |  97  |  49  ----------------------------<  191      [06-24-21 @ 01:11]  3.9   |  27  |  2.97        Ca     10.0     [06-24-21 @ 01:11]      Mg     1.8     [06-24-21 @ 01:11]      Phos  5.0     [06-24-21 @ 01:11]    TPro  6.2  /  Alb  2.8  /  TBili  0.9  /  DBili  x   /  AST  121  /  ALT  534  /  AlkPhos  180  [06-24-21 @ 01:11]    PT/INR: PT 15.5 , INR 1.37       [06-24-21 @ 01:11]  PTT: 31.9       [06-24-21 @ 01:11]      Creatinine Trend:  SCr 2.97 [06-24 @ 01:11]  SCr 2.81 [06-23 @ 02:21]  SCr 2.75 [06-22 @ 17:15]  SCr 2.77 [06-22 @ 08:44]  SCr 2.78 [06-22 @ 03:52]    Urinalysis - [06-20-21 @ 16:48]      Color Yellow / Appearance Clear / SG 1.032 / pH 6.5      Gluc >= 1000 mg/dL / Ketone Trace  / Bili Negative / Urobili <2 mg/dL       Blood Moderate / Protein >600 mg/dL / Leuk Est Negative / Nitrite Negative      RBC 52 / WBC 9 / Hyaline 1 / Gran  / Sq Epi  / Non Sq Epi 8 / Bacteria Few    Urine Creatinine 7      [06-21-21 @ 14:43]  Urine Protein 32      [06-21-21 @ 14:43]         [facility] Division of Kidney Diseases & Hypertension  FOLLOW UP NOTE  [phone]--------------------------------------------------------------------------------  Chief Complaint:Type 2 diabetes mellitus with ketoacidosis without coma      HPI: 85 year old male with PMHx HTN, DM  was brought in by EMS for lethargic and respiratory failure. Per ED note and collateral info from son, patient not feeling well since yesterday and was given 0.4 nitro by son. Pt presented to the ED and was reportedly hypoxic to 60s and lethargic. Mental status waxing and waning, patient was placed on BIPAP then subsequently intubated for airway protection.On admission, labs notable for metabolic acidosis, elevated lactate (pH: 7.02, serum CO2 low at 8 with lactate 11.7) and hyperglycemia. Pt received Vancomycin/Zosyn, 2L IVF, 1 amp sodium bicarbonate and started on Insulin drip. Nephrology team consulted for LINDSEY and acidosis. No previous labs for review on [facility]. On admission, pt with elevated SCr of 2.09 (6/10). Pt seen and examined in ICU. Pt intubated with FIO2: 50% PEEP+8) and sedated. BP stable without any IV vasopressor support.  CXR with bilateral pleural effusions. Review of meds showed pt on Atenolol, Telmisartan, Isosorbide and Colesevelam.    Pt seen & examined. Remains intubated. Sedation turned off for possible extubation today. Pt poorly responsive. Failed CPAP trials & put back on AC mode. FiO2 30% with PEEP 5. Remains off bumex & insulin gtt. UOP2.8L in 24 hrs.     PAST HISTORY  --------------------------------------------------------------------------------  No significant changes to PMH, PSH, FHx, SHx, unless otherwise noted    ALLERGIES & MEDICATIONS  --------------------------------------------------------------------------------  Allergies    dyes,iv dyes (Unknown)  iodinated radiocontrast agents (Hives)    Intolerances      Standing Inpatient Medications  aspirin  chewable 81 milliGRAM(s) Oral daily  carvedilol 3.125 milliGRAM(s) Oral every 12 hours  chlorhexidine 0.12% Liquid 15 milliLiter(s) Oral Mucosa every 12 hours  chlorhexidine 2% Cloths 1 Application(s) Topical daily  clopidogrel Tablet 75 milliGRAM(s) Oral daily  dextrose 40% Gel 15 Gram(s) Oral once  dextrose 5%. 1000 milliLiter(s) IV Continuous <Continuous>  dextrose 5%. 1000 milliLiter(s) IV Continuous <Continuous>  dextrose 50% Injectable 25 Gram(s) IV Push once  dextrose 50% Injectable 12.5 Gram(s) IV Push once  dextrose 50% Injectable 25 Gram(s) IV Push once  glucagon  Injectable 1 milliGRAM(s) IntraMuscular once  insulin lispro (ADMELOG) corrective regimen sliding scale   SubCutaneous every 6 hours  insulin NPH human recombinant 5 Unit(s) SubCutaneous every 6 hours  petrolatum Ophthalmic Ointment 1 Application(s) Both EYES two times a day    PRN Inpatient Medications  hydrALAZINE Injectable 5 milliGRAM(s) IV Push every 6 hours PRN      REVIEW OF SYSTEMS  --------------------------------------------------------------------------------  Unable to obtain    VITALS/PHYSICAL EXAM  --------------------------------------------------------------------------------  T(C): 38.1 (06-24-21 @ 08:00), Max: 38.1 (06-24-21 @ 08:00)  HR: 80 (06-24-21 @ 08:00) (66 - 87)  BP: 169/77 (06-24-21 @ 08:00) (130/58 - 169/77)  RR: 17 (06-24-21 @ 08:00) (0 - 20)  SpO2: 100% (06-24-21 @ 08:00) (100% - 100%)  Wt(kg): --        06-23-21 @ 07:01  -  06-24-21 @ 07:00  --------------------------------------------------------  IN: 400 mL / OUT: 2865 mL / NET: -2465 mL    06-24-21 @ 07:01  -  06-24-21 @ 09:35  --------------------------------------------------------  IN: 0 mL / OUT: 225 mL / NET: -225 mL      Physical Exam:  	 Gen: Intubated   	HEENT: +vent  	Pulm: mechanical breath sounds, fair air entry, decreased  	CV:  S1S2  	Abd: +BS, soft   	Ext: no bilateral LE edema  	Neuro: sedated  	Skin: Warm, without rashes  	Vascular access:               Vijaya    LABS/STUDIES  --------------------------------------------------------------------------------              11.7   14.66 >-----------<  138      [06-24-21 @ 01:11]              35.8     141  |  97  |  49  ----------------------------<  191      [06-24-21 @ 01:11]  3.9   |  27  |  2.97        Ca     10.0     [06-24-21 @ 01:11]      Mg     1.8     [06-24-21 @ 01:11]      Phos  5.0     [06-24-21 @ 01:11]    TPro  6.2  /  Alb  2.8  /  TBili  0.9  /  DBili  x   /  AST  121  /  ALT  534  /  AlkPhos  180  [06-24-21 @ 01:11]    PT/INR: PT 15.5 , INR 1.37       [06-24-21 @ 01:11]  PTT: 31.9       [06-24-21 @ 01:11]      Creatinine Trend:  SCr 2.97 [06-24 @ 01:11]  SCr 2.81 [06-23 @ 02:21]  SCr 2.75 [06-22 @ 17:15]  SCr 2.77 [06-22 @ 08:44]  SCr 2.78 [06-22 @ 03:52]    Urinalysis - [06-20-21 @ 16:48]      Color Yellow / Appearance Clear / SG 1.032 / pH 6.5      Gluc >= 1000 mg/dL / Ketone Trace  / Bili Negative / Urobili <2 mg/dL       Blood Moderate / Protein >600 mg/dL / Leuk Est Negative / Nitrite Negative      RBC 52 / WBC 9 / Hyaline 1 / Gran  / Sq Epi  / Non Sq Epi 8 / Bacteria Few    Urine Creatinine 7      [06-21-21 @ 14:43]  Urine Protein 32      [06-21-21 @ 14:43]

## 2021-06-24 NOTE — PROCEDURE NOTE - NSINDICATIONS_GEN_A_CORE
other
arterial puncture to obtain ABG's/monitoring purposes
emergency venous access
critical illness/emergency venous access

## 2021-06-24 NOTE — PROCEDURE NOTE - NSPROCDETAILS_GEN_ALL_CORE
location identified, draped/prepped, sterile technique used/blood seen on insertion/dressing applied/flushes easily/secured in place/sterile technique, catheter placed
guidewire recovered/lumen(s) aspirated and flushed/sterile dressing applied/sterile technique, catheter placed/ultrasound guidance with use of sterile gel and probe cove
location identified, draped/prepped, sterile technique used, needle inserted/introduced/positive blood return obtained via catheter/connected to a pressurized flush line/sutured in place/hemostasis with direct pressure, dressing applied/Seldinger technique/all materials/supplies accounted for at end of procedure

## 2021-06-24 NOTE — PROGRESS NOTE ADULT - ASSESSMENT
84 yo M with PMH of CAD s/p remote hx of PCI (unclear anatomy), uncontrolled T2DM, and HLD p/w hypoxia/AMS admitted to MICU for DKA on insulin gtt and likely late presentation MI. Intubated for AMS, and MICU course c/b ARF and shock liver- improving     TTE 6/2021  CONCLUSIONS:  1. Mitral annular calcification, otherwise normal mitral  valve. Minimal mitral regurgitation.  2. Calcified trileaflet aortic valve with normal opening.  Mild aortic regurgitation.  3. Normal left ventricular internal dimensions and wall  thicknesses.  4. Severe global left ventricular systolic dysfunction.  Endocardialvisualization enhanced with intravenous  injection of echo contrast (Definity).  5. Mild diastolic dysfunction (Stage I).  6. The right ventricle is not well visualized; grossly  normal right ventricular systolic function.  7. Estimated right ventricular systolic pressure equals 52  mm Hg, assuming right atrial pressure equals 10 mm Hg,  consistent with moderate pulmonary hypertension.  8. Left pleural effusion.    -cont DAPT  with ASA and Plavix, s/p 48 hours of heparin gtt   -off bumex gtt, monitor urine output, if subsides, would try IV bumex trial   -GDMT- increase coreg to 6.25 mg BID   -troponin has peaked, would not trend it further. Not a candidate for invasive intervention i.e multiorgan failure, contrast allergy at this time, will re consider when making meaningful recovery.   -appreciate MICU care     Thank you for allowing us to participate in the care of your patient. If you have any questions or concerns please do not hesitate to contact us 24/7.     Reese Sam MD x [phone]  Cardiology Fellow, [facility]  All Cardiology service information can be found 24/7 on [email or web address], password: [employer]

## 2021-06-25 LAB
ALBUMIN SERPL ELPH-MCNC: 2.7 G/DL — LOW (ref 3.3–5)
ALP SERPL-CCNC: 174 U/L — HIGH (ref 40–120)
ALT FLD-CCNC: 334 U/L — HIGH (ref 4–41)
ANION GAP SERPL CALC-SCNC: 15 MMOL/L — HIGH (ref 7–14)
APTT BLD: 142.6 SEC — CRITICAL HIGH (ref 27–36.3)
APTT BLD: 89.8 SEC — HIGH (ref 27–36.3)
AST SERPL-CCNC: 72 U/L — HIGH (ref 4–40)
BILIRUB SERPL-MCNC: 0.7 MG/DL — SIGNIFICANT CHANGE UP (ref 0.2–1.2)
BLOOD GAS ARTERIAL - LYTES,HGB,ICA,LACT RESULT: SIGNIFICANT CHANGE UP
BUN SERPL-MCNC: 66 MG/DL — HIGH (ref 7–23)
CALCIUM SERPL-MCNC: 10.1 MG/DL — SIGNIFICANT CHANGE UP (ref 8.4–10.5)
CHLORIDE SERPL-SCNC: 95 MMOL/L — LOW (ref 98–107)
CO2 SERPL-SCNC: 31 MMOL/L — SIGNIFICANT CHANGE UP (ref 22–31)
CREAT SERPL-MCNC: 3.04 MG/DL — HIGH (ref 0.5–1.3)
CULTURE RESULTS: SIGNIFICANT CHANGE UP
CULTURE RESULTS: SIGNIFICANT CHANGE UP
GLUCOSE BLDC GLUCOMTR-MCNC: 106 MG/DL — HIGH (ref 70–99)
GLUCOSE BLDC GLUCOMTR-MCNC: 138 MG/DL — HIGH (ref 70–99)
GLUCOSE BLDC GLUCOMTR-MCNC: 152 MG/DL — HIGH (ref 70–99)
GLUCOSE BLDC GLUCOMTR-MCNC: 82 MG/DL — SIGNIFICANT CHANGE UP (ref 70–99)
GLUCOSE SERPL-MCNC: 145 MG/DL — HIGH (ref 70–99)
HCT VFR BLD CALC: 36.2 % — LOW (ref 39–50)
HGB BLD-MCNC: 11.6 G/DL — LOW (ref 13–17)
INR BLD: 1.45 RATIO — HIGH (ref 0.88–1.16)
MAGNESIUM SERPL-MCNC: 2 MG/DL — SIGNIFICANT CHANGE UP (ref 1.6–2.6)
MCHC RBC-ENTMCNC: 29 PG — SIGNIFICANT CHANGE UP (ref 27–34)
MCHC RBC-ENTMCNC: 32 GM/DL — SIGNIFICANT CHANGE UP (ref 32–36)
MCV RBC AUTO: 90.5 FL — SIGNIFICANT CHANGE UP (ref 80–100)
NRBC # BLD: 0 /100 WBCS — SIGNIFICANT CHANGE UP
NRBC # FLD: 0 K/UL — SIGNIFICANT CHANGE UP
PHOSPHATE SERPL-MCNC: 5.9 MG/DL — HIGH (ref 2.5–4.5)
PLATELET # BLD AUTO: 143 K/UL — LOW (ref 150–400)
POTASSIUM SERPL-MCNC: 3.6 MMOL/L — SIGNIFICANT CHANGE UP (ref 3.5–5.3)
POTASSIUM SERPL-SCNC: 3.6 MMOL/L — SIGNIFICANT CHANGE UP (ref 3.5–5.3)
PROT SERPL-MCNC: 6.3 G/DL — SIGNIFICANT CHANGE UP (ref 6–8.3)
PROTHROM AB SERPL-ACNC: 16.2 SEC — HIGH (ref 10.6–13.6)
RBC # BLD: 4 M/UL — LOW (ref 4.2–5.8)
RBC # FLD: 14.3 % — SIGNIFICANT CHANGE UP (ref 10.3–14.5)
SODIUM SERPL-SCNC: 141 MMOL/L — SIGNIFICANT CHANGE UP (ref 135–145)
SPECIMEN SOURCE: SIGNIFICANT CHANGE UP
SPECIMEN SOURCE: SIGNIFICANT CHANGE UP
WBC # BLD: 11.94 K/UL — HIGH (ref 3.8–10.5)
WBC # FLD AUTO: 11.94 K/UL — HIGH (ref 3.8–10.5)

## 2021-06-25 PROCEDURE — 99291 CRITICAL CARE FIRST HOUR: CPT

## 2021-06-25 PROCEDURE — 71045 X-RAY EXAM CHEST 1 VIEW: CPT | Mod: 26

## 2021-06-25 PROCEDURE — 99232 SBSQ HOSP IP/OBS MODERATE 35: CPT

## 2021-06-25 PROCEDURE — 99233 SBSQ HOSP IP/OBS HIGH 50: CPT

## 2021-06-25 RX ORDER — HEPARIN SODIUM 5000 [USP'U]/ML
900 INJECTION INTRAVENOUS; SUBCUTANEOUS
Qty: 25000 | Refills: 0 | Status: DISCONTINUED | OUTPATIENT
Start: 2021-06-25 | End: 2021-06-30

## 2021-06-25 RX ORDER — NOREPINEPHRINE BITARTRATE/D5W 8 MG/250ML
0.05 PLASTIC BAG, INJECTION (ML) INTRAVENOUS
Qty: 8 | Refills: 0 | Status: DISCONTINUED | OUTPATIENT
Start: 2021-06-25 | End: 2021-06-26

## 2021-06-25 RX ORDER — FENTANYL CITRATE 50 UG/ML
100 INJECTION INTRAVENOUS ONCE
Refills: 0 | Status: DISCONTINUED | OUTPATIENT
Start: 2021-06-25 | End: 2021-06-25

## 2021-06-25 RX ORDER — HYDRALAZINE HCL 50 MG
10 TABLET ORAL ONCE
Refills: 0 | Status: COMPLETED | OUTPATIENT
Start: 2021-06-25 | End: 2021-06-25

## 2021-06-25 RX ORDER — SODIUM CHLORIDE 9 MG/ML
1000 INJECTION INTRAMUSCULAR; INTRAVENOUS; SUBCUTANEOUS ONCE
Refills: 0 | Status: COMPLETED | OUTPATIENT
Start: 2021-06-25 | End: 2021-06-25

## 2021-06-25 RX ADMIN — Medication 1 APPLICATION(S): at 05:14

## 2021-06-25 RX ADMIN — Medication 5.32 MICROGRAM(S)/KG/MIN: at 09:01

## 2021-06-25 RX ADMIN — SODIUM CHLORIDE 166.67 MILLILITER(S): 9 INJECTION INTRAMUSCULAR; INTRAVENOUS; SUBCUTANEOUS at 11:51

## 2021-06-25 RX ADMIN — HUMAN INSULIN 5 UNIT(S): 100 INJECTION, SUSPENSION SUBCUTANEOUS at 05:15

## 2021-06-25 RX ADMIN — CHLORHEXIDINE GLUCONATE 1 APPLICATION(S): 213 SOLUTION TOPICAL at 12:17

## 2021-06-25 RX ADMIN — CHLORHEXIDINE GLUCONATE 15 MILLILITER(S): 213 SOLUTION TOPICAL at 05:14

## 2021-06-25 RX ADMIN — Medication 1: at 05:15

## 2021-06-25 RX ADMIN — HEPARIN SODIUM 0 UNIT(S)/HR: 5000 INJECTION INTRAVENOUS; SUBCUTANEOUS at 11:00

## 2021-06-25 RX ADMIN — HEPARIN SODIUM 0 UNIT(S)/HR: 5000 INJECTION INTRAVENOUS; SUBCUTANEOUS at 02:29

## 2021-06-25 RX ADMIN — HUMAN INSULIN 5 UNIT(S): 100 INJECTION, SUSPENSION SUBCUTANEOUS at 12:18

## 2021-06-25 RX ADMIN — CARVEDILOL PHOSPHATE 3.12 MILLIGRAM(S): 80 CAPSULE, EXTENDED RELEASE ORAL at 18:13

## 2021-06-25 RX ADMIN — Medication 5.32 MICROGRAM(S)/KG/MIN: at 03:40

## 2021-06-25 RX ADMIN — HUMAN INSULIN 5 UNIT(S): 100 INJECTION, SUSPENSION SUBCUTANEOUS at 23:29

## 2021-06-25 RX ADMIN — Medication 5.32 MICROGRAM(S)/KG/MIN: at 19:57

## 2021-06-25 RX ADMIN — FENTANYL CITRATE 100 MICROGRAM(S): 50 INJECTION INTRAVENOUS at 03:01

## 2021-06-25 RX ADMIN — Medication 10 MILLIGRAM(S): at 16:45

## 2021-06-25 RX ADMIN — HEPARIN SODIUM 900 UNIT(S)/HR: 5000 INJECTION INTRAVENOUS; SUBCUTANEOUS at 17:44

## 2021-06-25 NOTE — PROGRESS NOTE ADULT - ASSESSMENT
85 year old male with hx of diabetes who comes in for AMS, found to be hypoxic and in DKA with additional oliguric renal failure, intubated and sedated 6/20 in the ED due to worsening mental status and inability to protect airway and now with STEMI on medical management    Neuro  #AMS, likely metabolic encephalopathy iso hypercapnia and metabolic acidosis iso renal failure  - off sedation; opens eyes to voice; does not follow commands  - continues to be on minimal vent. setting    Pulm   #hypercapnic respiratory failure likely iso metabolic encephalopathy and intubated 2/2 c/f airway protection  - on SBT 6/24 AM  - possible extubation later today if mental status continues to improve    Cardiac:   #STEMI in leads V1/V2 w/rising troponin and CKMB  -s/p ASA/plavix load; c/w ASA, Plavix; c/w hep gtt for 48h  -c/f ADHF given pleural effusions and BNP 50k though w/minimal O2 requirements on ventilator (PEEP 8/FiO2 30%); hold BB  -off bumex gtt since ~0800 on 6/22   -no plans for cardiac cath while pt intubated and not to baseline mental status, per cardiology    #L femoral-deep peroneal artery byspass (on prelim [facility] arterial)  - w/stenosis of bypass tract w/o occlusion; likely chronic given discoloration and skin changes noted on LE; LLE perfused  - will benefit from vascular f/u after resolution of acute    GI  #LFT elevation, likely shock liver given acute rise in LFTs from WNL to 3000/1600 iso hypoperfusion from MI vs. less likely congestive hepatopathy  - INR 1.9, platelets 125  - continue to trend to clearance  - OG tube in place; hold TF for potential extubation today    Renal:   #LINDSEY (no previous labs to assess baseline) c/b oliguric renal failure, likely ATN 2/2 hypoperfusion iso MI vs. hypovolemia prior to MI  - Cr uptrending likely iso ATN that is starting to plateau  - HAGMA 2/2 lactic acidosis  - UOP 2.3L off bumex for 24h; level of UOP iso post-ATN diuresis    #metabolic alkalosis, likely contraction alkalosis  - HCO3 27 today, net (-) 6.7L iso persistent diarrhea prior to admission  - will consider starting IVF    Endo  #DKA, resolved  - AG present but w/o BHB or acidosis; AG likely iso renal failure  - on NPH 11 Q6H; or 5 Q6H when NPO; requirements based off insulin gtt    ID:  SIRS w/hypothermia, tachycardia, leukocytosis, lactate w/o obvious source of infection; hypothermia resolved, likely was iso hypoperfusion from STEMI  - RLE w/crusted lesions without associated erythema, warmth, or exudates  - BCx pending NGTD, started empirically on vancomycin/zosyn/doxy (6/20/21- 6/21/21)  - off abx, monitor T curve    Heme/Onc:   -DVT ppx: SQH 85 year old male with hx of diabetes who comes in for AMS, found to be hypoxic and in DKA with additional oliguric renal failure, intubated and sedated 6/20 in the ED due to worsening mental status and inability to protect airway and now with STEMI on medical management    Neuro  #AMS, likely metabolic encephalopathy iso hypercapnia and metabolic acidosis iso renal failure  - off sedation; opens eyes to voice; patient following commands  - continues to be on minimal vent. setting  - CT head ordered, pending    Pulm   #hypercapnic respiratory failure likely iso metabolic encephalopathy and intubated 2/2 c/f airway protection  - Continue monitoring patient  - possible extubation later today if mental status continues to improve    Cardiac:   #STEMI in leads V1/V2 w/rising troponin and CKMB  -s/p ASA/plavix load; c/w ASA, Plavix; c/w hep gtt for 48h  -c/f ADHF given pleural effusions and BNP 50k though w/minimal O2 requirements on ventilator (PEEP 8/FiO2 30%); hold BB  -off bumex gtt since ~0800 on 6/22   -no plans for cardiac cath while pt intubated and not to baseline mental status, per cardiology    #L femoral-deep peroneal artery byspass (on prelim [facility] Duplex arterial)  - 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 Will follow-up official consult     GI  #LFT elevation, likely shock liver given acute rise in LFTs from WNL to 3000/1600 iso hypoperfusion from MI vs. less likely congestive hepatopathy  - LFTs trending down  - continue to monitor  - OG tube in place; hold TF for potential extubation today    Renal:   #LINDSEY (no previous labs to assess baseline) c/b oliguric renal failure, likely ATN 2/2 hypoperfusion iso MI vs. hypovolemia prior to MI  - Cr uptrending likely iso ATN that is starting to plateau  - HAGMA 2/2 lactic acidosis  - UOP 2.34L off bumex for 48h; level of UOP iso post-ATN diuresis  - Patient given 1L of fluid given over 6hrs (2/25)    #metabolic alkalosis, likely contraction alkalosis  - HCO3 31 today  - Patient given 1L bolus (2/25)    Endo  #DKA, resolved  - AG present but w/o BHB or acidosis; AG likely iso renal failure  - on NPH 11 Q6H; or 5 Q6H when NPO; requirements based off insulin gtt    ID:  SIRS w/hypothermia, tachycardia, leukocytosis, lactate w/o obvious source of infection; hypothermia resolved, likely was iso hypoperfusion from STEMI  - RLE w/crusted lesions without associated erythema, warmth, or exudates  - BCx pending NGTD, started empirically on vancomycin/zosyn/doxy (6/20/21- 6/21/21)  - off abx, monitor T curve    Heme/Onc:   -DVT ppx: [facility]

## 2021-06-25 NOTE — PROGRESS NOTE ADULT - SUBJECTIVE AND OBJECTIVE BOX
[facility] Division of Kidney Diseases & Hypertension  FOLLOW UP NOTE  [phone]--------------------------------------------------------------------------------  Chief Complaint:Type 2 diabetes mellitus with ketoacidosis without coma      HPI: 85 year old male with PMHx HTN, DM  was brought in by EMS for lethargic and respiratory failure. Per ED note and collateral info from son, patient not feeling well since yesterday and was given 0.4 nitro by son. Pt presented to the ED and was reportedly hypoxic to 60s and lethargic. Mental status waxing and waning, patient was placed on BIPAP then subsequently intubated for airway protection.On admission, labs notable for metabolic acidosis, elevated lactate (pH: 7.02, serum CO2 low at 8 with lactate 11.7) and hyperglycemia. Pt received Vancomycin/Zosyn, 2L IVF, 1 amp sodium bicarbonate and started on Insulin drip. Nephrology team consulted for LINDSEY and acidosis. No previous labs for review on [facility]. On admission, pt with elevated SCr of 2.09 (6/10). Pt was found to be hypoxic and in DKA with additional oliguric renal failure, intubated and sedated 6/20 in the ED due to worsening mental status and inability to protect airway and now with STEMI on medical management        Pt seen & examined. Remains intubated. SBT ongoing.  Pt poorly responsive. Failed CPAP trials & put back on AC mode. FiO2 30% with PEEP 5. Remains off bumex & insulin gtt. Currently on norepinephrine. UOP2.4L in 24 hrs.             PAST HISTORY  --------------------------------------------------------------------------------  No significant changes to PMH, PSH, FHx, SHx, unless otherwise noted    ALLERGIES & MEDICATIONS  --------------------------------------------------------------------------------  Allergies    dyes,iv dyes (Unknown)  iodinated radiocontrast agents (Hives)    Intolerances      Standing Inpatient Medications  aspirin  chewable 81 milliGRAM(s) Oral daily  carvedilol 3.125 milliGRAM(s) Oral every 12 hours  chlorhexidine 0.12% Liquid 15 milliLiter(s) Oral Mucosa every 12 hours  chlorhexidine 2% Cloths 1 Application(s) Topical daily  clopidogrel Tablet 75 milliGRAM(s) Oral daily  dextrose 40% Gel 15 Gram(s) Oral once  dextrose 5%. 1000 milliLiter(s) IV Continuous <Continuous>  dextrose 5%. 1000 milliLiter(s) IV Continuous <Continuous>  dextrose 50% Injectable 25 Gram(s) IV Push once  dextrose 50% Injectable 12.5 Gram(s) IV Push once  dextrose 50% Injectable 25 Gram(s) IV Push once  glucagon  Injectable 1 milliGRAM(s) IntraMuscular once  heparin  Infusion.  Unit(s)/Hr IV Continuous <Continuous>  insulin lispro (ADMELOG) corrective regimen sliding scale   SubCutaneous every 6 hours  insulin NPH human recombinant 5 Unit(s) SubCutaneous every 6 hours  norepinephrine Infusion 0.05 MICROgram(s)/kG/Min IV Continuous <Continuous>  petrolatum Ophthalmic Ointment 1 Application(s) Both EYES two times a day    PRN Inpatient Medications  heparin   Injectable 4500 Unit(s) IV Push every 6 hours PRN  heparin   Injectable 2000 Unit(s) IV Push every 6 hours PRN  hydrALAZINE Injectable 5 milliGRAM(s) IV Push every 6 hours PRN      REVIEW OF SYSTEMS  --------------------------------------------------------------------------------  Unable to obtain      VITALS/PHYSICAL EXAM  --------------------------------------------------------------------------------  T(C): 37.8 (06-25-21 @ 04:00), Max: 37.8 (06-25-21 @ 04:00)  HR: 67 (06-25-21 @ 06:12) (60 - 83)  BP: 133/63 (06-25-21 @ 06:00) (77/42 - 164/71)  RR: 17 (06-25-21 @ 06:00) (11 - 27)  SpO2: 100% (06-25-21 @ 06:12) (100% - 100%)  Wt(kg): --        06-24-21 @ 07:01  -  06-25-21 @ 07:00  --------------------------------------------------------  IN: 452.5 mL / OUT: 2425 mL / NET: -1972.5 mL      Physical Exam:  	Gen: Intubated   	HEENT: +vent  	Pulm: mechanical breath sounds, fair air entry, decreased  	CV:  S1S2  	Abd: +BS, soft   	Ext: no bilateral LE edema, IO line on right leg  	Neuro: sedated  	Skin: Warm, without rashes  	Vascular access:         LABS/STUDIES  --------------------------------------------------------------------------------              11.6   11.94 >-----------<  143      [06-25-21 @ 00:34]              36.2     141  |  95  |  66  ----------------------------<  145      [06-25-21 @ 00:34]  3.6   |  31  |  3.04        Ca     10.1     [06-25-21 @ 00:34]      Mg     2.0     [06-25-21 @ 00:34]      Phos  5.9     [06-25-21 @ 00:34]    TPro  6.3  /  Alb  2.7  /  TBili  0.7  /  DBili  x   /  AST  72  /  ALT  334  /  AlkPhos  174  [06-25-21 @ 00:34]    PT/INR: PT 16.2 , INR 1.45       [06-25-21 @ 00:34]  PTT: 142.6      [06-25-21 @ 00:34]      Creatinine Trend:  SCr 3.04 [06-25 @ 00:34]  SCr 2.97 [06-24 @ 01:11]  SCr 2.81 [06-23 @ 02:21]  SCr 2.75 [06-22 @ 17:15]  SCr 2.77 [06-22 @ 08:44]    Urinalysis - [06-20-21 @ 16:48]      Color Yellow / Appearance Clear / SG 1.032 / pH 6.5      Gluc >= 1000 mg/dL / Ketone Trace  / Bili Negative / Urobili <2 mg/dL       Blood Moderate / Protein >600 mg/dL / Leuk Est Negative / Nitrite Negative      RBC 52 / WBC 9 / Hyaline 1 / Gran  / Sq Epi  / Non Sq Epi 8 / Bacteria Few    Urine Creatinine 7      [06-21-21 @ 14:43]  Urine Protein 32      [06-21-21 @ 14:43]         [facility] Division of Kidney Diseases & Hypertension  FOLLOW UP NOTE  [phone]--------------------------------------------------------------------------------  Chief Complaint:Type 2 diabetes mellitus with ketoacidosis without coma      HPI: 85 year old male with PMHx HTN, DM  was brought in by EMS for lethargic and respiratory failure. Per ED note and collateral info from son, patient not feeling well since yesterday and was given 0.4 nitro by son. Pt presented to the ED and was reportedly hypoxic to 60s and lethargic. Mental status waxing and waning, patient was placed on BIPAP then subsequently intubated for airway protection.On admission, labs notable for metabolic acidosis, elevated lactate (pH: 7.02, serum CO2 low at 8 with lactate 11.7) and hyperglycemia. Pt received Vancomycin/Zosyn, 2L IVF, 1 amp sodium bicarbonate and started on Insulin drip. Nephrology team consulted for LINDSEY and acidosis. No previous labs for review on [facility]. On admission, pt with elevated SCr of 2.09 (6/10). Pt was found to be hypoxic and in DKA with additional oliguric renal failure, intubated and sedated 6/20 in the ED due to worsening mental status and inability to protect airway.    Pt seen & examined. Remains intubated. SBT ongoing.  Pt poorly responsive. Failed CPAP trials & put back on AC mode. FiO2 30% with PEEP 5. Remains off bumex & insulin gtt. Currently on norepinephrine. UOP2.4L in 24 hrs.     PAST HISTORY  --------------------------------------------------------------------------------  No significant changes to PMH, PSH, FHx, SHx, unless otherwise noted    ALLERGIES & MEDICATIONS  --------------------------------------------------------------------------------  Allergies    dyes,iv dyes (Unknown)  iodinated radiocontrast agents (Hives)    Intolerances      Standing Inpatient Medications  aspirin  chewable 81 milliGRAM(s) Oral daily  carvedilol 3.125 milliGRAM(s) Oral every 12 hours  chlorhexidine 0.12% Liquid 15 milliLiter(s) Oral Mucosa every 12 hours  chlorhexidine 2% Cloths 1 Application(s) Topical daily  clopidogrel Tablet 75 milliGRAM(s) Oral daily  dextrose 40% Gel 15 Gram(s) Oral once  dextrose 5%. 1000 milliLiter(s) IV Continuous <Continuous>  dextrose 5%. 1000 milliLiter(s) IV Continuous <Continuous>  dextrose 50% Injectable 25 Gram(s) IV Push once  dextrose 50% Injectable 12.5 Gram(s) IV Push once  dextrose 50% Injectable 25 Gram(s) IV Push once  glucagon  Injectable 1 milliGRAM(s) IntraMuscular once  heparin  Infusion.  Unit(s)/Hr IV Continuous <Continuous>  insulin lispro (ADMELOG) corrective regimen sliding scale   SubCutaneous every 6 hours  insulin NPH human recombinant 5 Unit(s) SubCutaneous every 6 hours  norepinephrine Infusion 0.05 MICROgram(s)/kG/Min IV Continuous <Continuous>  petrolatum Ophthalmic Ointment 1 Application(s) Both EYES two times a day    PRN Inpatient Medications  heparin   Injectable 4500 Unit(s) IV Push every 6 hours PRN  heparin   Injectable 2000 Unit(s) IV Push every 6 hours PRN  hydrALAZINE Injectable 5 milliGRAM(s) IV Push every 6 hours PRN      REVIEW OF SYSTEMS  --------------------------------------------------------------------------------  Unable to obtain      VITALS/PHYSICAL EXAM  --------------------------------------------------------------------------------  T(C): 37.8 (06-25-21 @ 04:00), Max: 37.8 (06-25-21 @ 04:00)  HR: 67 (06-25-21 @ 06:12) (60 - 83)  BP: 133/63 (06-25-21 @ 06:00) (77/42 - 164/71)  RR: 17 (06-25-21 @ 06:00) (11 - 27)  SpO2: 100% (06-25-21 @ 06:12) (100% - 100%)  Wt(kg): --        06-24-21 @ 07:01  -  06-25-21 @ 07:00  --------------------------------------------------------  IN: 452.5 mL / OUT: 2425 mL / NET: -1972.5 mL      Physical Exam:  	Gen: Intubated   	HEENT: +vent  	Pulm: mechanical breath sounds, fair air entry, decreased  	CV:  S1S2  	Abd: +BS, soft   	Ext: no bilateral LE edema, IO line on right leg  	Neuro: sedated  	Skin: Warm, without rashes  	Vascular access:         LABS/STUDIES  --------------------------------------------------------------------------------              11.6   11.94 >-----------<  143      [06-25-21 @ 00:34]              36.2     141  |  95  |  66  ----------------------------<  145      [06-25-21 @ 00:34]  3.6   |  31  |  3.04        Ca     10.1     [06-25-21 @ 00:34]      Mg     2.0     [06-25-21 @ 00:34]      Phos  5.9     [06-25-21 @ 00:34]    TPro  6.3  /  Alb  2.7  /  TBili  0.7  /  DBili  x   /  AST  72  /  ALT  334  /  AlkPhos  174  [06-25-21 @ 00:34]    PT/INR: PT 16.2 , INR 1.45       [06-25-21 @ 00:34]  PTT: 142.6      [06-25-21 @ 00:34]      Creatinine Trend:  SCr 3.04 [06-25 @ 00:34]  SCr 2.97 [06-24 @ 01:11]  SCr 2.81 [06-23 @ 02:21]  SCr 2.75 [06-22 @ 17:15]  SCr 2.77 [06-22 @ 08:44]    Urinalysis - [06-20-21 @ 16:48]      Color Yellow / Appearance Clear / SG 1.032 / pH 6.5      Gluc >= 1000 mg/dL / Ketone Trace  / Bili Negative / Urobili <2 mg/dL       Blood Moderate / Protein >600 mg/dL / Leuk Est Negative / Nitrite Negative      RBC 52 / WBC 9 / Hyaline 1 / Gran  / Sq Epi  / Non Sq Epi 8 / Bacteria Few    Urine Creatinine 7      [06-21-21 @ 14:43]  Urine Protein 32      [06-21-21 @ 14:43]

## 2021-06-25 NOTE — PROGRESS NOTE ADULT - ASSESSMENT
86 yo M with PMH of CAD s/p remote hx of PCI (unclear anatomy), uncontrolled T2DM, and HLD p/w hypoxia/AMS admitted to MICU for DKA on insulin gtt and likely late presentation MI. Intubated for AMS, and MICU course c/b ARF and shock liver- unable to extubate due to mental status     TTE 6/2021  CONCLUSIONS:  1. Mitral annular calcification, otherwise normal mitral  valve. Minimal mitral regurgitation.  2. Calcified trileaflet aortic valve with normal opening.  Mild aortic regurgitation.  3. Normal left ventricular internal dimensions and wall  thicknesses.  4. Severe global left ventricular systolic dysfunction.  Endocardialvisualization enhanced with intravenous  injection of echo contrast (Definity).  5. Mild diastolic dysfunction (Stage I).  6. The right ventricle is not well visualized; grossly  normal right ventricular systolic function.  7. Estimated right ventricular systolic pressure equals 52  mm Hg, assuming right atrial pressure equals 10 mm Hg,  consistent with moderate pulmonary hypertension.  8. Left pleural effusion.    -currently, pt is on beta blocker and pressor support, would d/c coreg if pt is pressor dependent   -cont DAPT  with ASA and Plavix, off bumex gtt, monitor urine output, if subsides, would try IV bumex trial  -troponin has peaked, would not trend it further. Not a candidate for invasive intervention i.e multiorgan failure, contrast allergy at this time, will re consider when making meaningful recovery.   -appreciate MICU care     Thank you for allowing us to participate in the care of your patient. If you have any questions or concerns please do not hesitate to contact us 24/7.     Reese Sam MD x [phone]  Cardiology Fellow, [facility]  All Cardiology service information can be found 24/7 on [email or web address], password: [email or web address]

## 2021-06-25 NOTE — PROGRESS NOTE ADULT - SUBJECTIVE AND OBJECTIVE BOX
INTERVAL HPI/OVERNIGHT EVENTS:    SUBJECTIVE: Patient seen and examined at bedside.     OBJECTIVE:    VITAL SIGNS:  ICU Vital Signs Last 24 Hrs  T(C): 37.8 (25 Jun 2021 04:00), Max: 37.8 (25 Jun 2021 04:00)  T(F): 100 (25 Jun 2021 04:00), Max: 100 (25 Jun 2021 04:00)  HR: 67 (25 Jun 2021 06:12) (60 - 83)  BP: 133/63 (25 Jun 2021 06:00) (77/42 - 164/71)  BP(mean): 79 (25 Jun 2021 06:00) (50 - 102)  ABP: --  ABP(mean): --  RR: 17 (25 Jun 2021 06:00) (11 - 27)  SpO2: 100% (25 Jun 2021 06:12) (100% - 100%)    Mode: AC/ CMV (Assist Control/ Continuous Mandatory Ventilation), RR (machine): 10, TV (machine): 300, FiO2: 30, PEEP: 5, ITime: 0.86, MAP: 8, PIP: 16    06-24 @ 07:01  -  06-25 @ 07:00  --------------------------------------------------------  IN: 452.5 mL / OUT: 2425 mL / NET: -1972.5 mL      CAPILLARY BLOOD GLUCOSE      POCT Blood Glucose.: 152 mg/dL (25 Jun 2021 05:03)      PHYSICAL EXAM:    General: NAD  HEENT: NC/AT; PERRL, clear conjunctiva  Neck: supple  Respiratory: CTA b/l  Cardiovascular: +S1/S2; RRR  Abdomen: soft, NT/ND; +BS x4  Extremities: WWP, 2+ peripheral pulses b/l; no LE edema  Skin: normal color and turgor; no rash  Neurological:    MEDICATIONS:  MEDICATIONS  (STANDING):  aspirin  chewable 81 milliGRAM(s) Oral daily  carvedilol 3.125 milliGRAM(s) Oral every 12 hours  chlorhexidine 0.12% Liquid 15 milliLiter(s) Oral Mucosa every 12 hours  chlorhexidine 2% Cloths 1 Application(s) Topical daily  clopidogrel Tablet 75 milliGRAM(s) Oral daily  dextrose 40% Gel 15 Gram(s) Oral once  dextrose 5%. 1000 milliLiter(s) (50 mL/Hr) IV Continuous <Continuous>  dextrose 5%. 1000 milliLiter(s) (100 mL/Hr) IV Continuous <Continuous>  dextrose 50% Injectable 25 Gram(s) IV Push once  dextrose 50% Injectable 12.5 Gram(s) IV Push once  dextrose 50% Injectable 25 Gram(s) IV Push once  glucagon  Injectable 1 milliGRAM(s) IntraMuscular once  heparin  Infusion.  Unit(s)/Hr (11 mL/Hr) IV Continuous <Continuous>  insulin lispro (ADMELOG) corrective regimen sliding scale   SubCutaneous every 6 hours  insulin NPH human recombinant 5 Unit(s) SubCutaneous every 6 hours  norepinephrine Infusion 0.05 MICROgram(s)/kG/Min (5.32 mL/Hr) IV Continuous <Continuous>  petrolatum Ophthalmic Ointment 1 Application(s) Both EYES two times a day    MEDICATIONS  (PRN):  heparin   Injectable 4500 Unit(s) IV Push every 6 hours PRN For aPTT less than 40  heparin   Injectable 2000 Unit(s) IV Push every 6 hours PRN For aPTT between 40 - 57  hydrALAZINE Injectable 5 milliGRAM(s) IV Push every 6 hours PRN for SBP >160      ALLERGIES:  Allergies    dyes,iv dyes (Unknown)  iodinated radiocontrast agents (Hives)    Intolerances        LABS:                        11.6   11.94 )-----------( 143      ( 25 Jun 2021 00:34 )             36.2     Hemoglobin: 11.6 g/dL (06-25 @ 00:34)  Hemoglobin: 11.7 g/dL (06-24 @ 01:11)  Hemoglobin: 11.1 g/dL (06-23 @ 02:21)  Hemoglobin: 12.1 g/dL (06-22 @ 03:52)  Hemoglobin: 11.9 g/dL (06-21 @ 05:29)    CBC Full  -  ( 25 Jun 2021 00:34 )  WBC Count : 11.94 K/uL  RBC Count : 4.00 M/uL  Hemoglobin : 11.6 g/dL  Hematocrit : 36.2 %  Platelet Count - Automated : 143 K/uL  Mean Cell Volume : 90.5 fL  Mean Cell Hemoglobin : 29.0 pg  Mean Cell Hemoglobin Concentration : 32.0 gm/dL  Auto Neutrophil # : x  Auto Lymphocyte # : x  Auto Monocyte # : x  Auto Eosinophil # : x  Auto Basophil # : x  Auto Neutrophil % : x  Auto Lymphocyte % : x  Auto Monocyte % : x  Auto Eosinophil % : x  Auto Basophil % : x    06-25    141  |  95<L>  |  66<H>  ----------------------------<  145<H>  3.6   |  31  |  3.04<H>    Ca    10.1      25 Jun 2021 00:34  Phos  5.9     06-25  Mg     2.0     06-25    TPro  6.3  /  Alb  2.7<L>  /  TBili  0.7  /  DBili  x   /  AST  72<H>  /  ALT  334<H>  /  AlkPhos  174<H>  06-25    Creatinine Trend: 3.04<--, 2.97<--, 2.81<--, 2.75<--, 2.77<--, 2.78<--  LIVER FUNCTIONS - ( 25 Jun 2021 00:34 )  Alb: 2.7 g/dL / Pro: 6.3 g/dL / ALK PHOS: 174 U/L / ALT: 334 U/L / AST: 72 U/L / GGT: x           PT/INR - ( 25 Jun 2021 00:34 )   PT: 16.2 sec;   INR: 1.45 ratio         PTT - ( 25 Jun 2021 00:34 )  PTT:142.6 sec    hs Troponin:    ABG - ( 24 Jun 2021 01:11 )  pH, Arterial: 7.54  pH, Blood: x     /  pCO2: 38    /  pO2: 149   / HCO3: 33    / Base Excess: 8.9   /  SaO2: 99.5                      CSF:                      EKG:   MICROBIOLOGY:    IMAGING:      Labs, imaging, EKG personally reviewed    RADIOLOGY & ADDITIONAL TESTS: Reviewed.     INTERVAL HPI/OVERNIGHT EVENTS:    SUBJECTIVE: Patient seen and examined at bedside.     - Patient following command this am  - TF held for possible extubation later today  - Patient put on CPAP today   - Patient off pressors since this am and tolerating    OBJECTIVE:    VITAL SIGNS:  ICU Vital Signs Last 24 Hrs  T(C): 37.8 (25 Jun 2021 04:00), Max: 37.8 (25 Jun 2021 04:00)  T(F): 100 (25 Jun 2021 04:00), Max: 100 (25 Jun 2021 04:00)  HR: 67 (25 Jun 2021 06:12) (60 - 83)  BP: 133/63 (25 Jun 2021 06:00) (77/42 - 164/71)  BP(mean): 79 (25 Jun 2021 06:00) (50 - 102)  ABP: --  ABP(mean): --  RR: 17 (25 Jun 2021 06:00) (11 - 27)  SpO2: 100% (25 Jun 2021 06:12) (100% - 100%)    Mode: AC/ CMV (Assist Control/ Continuous Mandatory Ventilation), RR (machine): 10, TV (machine): 300, FiO2: 30, PEEP: 5, ITime: 0.86, MAP: 8, PIP: 16    06-24 @ 07:01  -  06-25 @ 07:00  --------------------------------------------------------  IN: 452.5 mL / OUT: 2425 mL / NET: -1972.5 mL      CAPILLARY BLOOD GLUCOSE      POCT Blood Glucose.: 152 mg/dL (25 Jun 2021 05:03)      PHYSICAL EXAM:    General: NAD  EYES: conjunctiva clear  Respiratory: intubated on minimal settings; Breath sounds are clear bilaterally, No wheezing, rales or rhonchi  Cardiovascular: S1 and S2, regular rate and rhythm, no Murmurs, gallops or rubs, no JVD,    Gastrointestinal: Bowel Sounds present, soft, nontender, nondistended, no guarding, no rebound  Extremities: No cyanosis or clubbing; warm to touch  Vascular: 2+ peripheral pulses lower ex  Neurological: awakens to voice; daily improvement in neuro status noted; does not follow commands; does not spontaneously open eyes; Pupils are equally reactive to light and symmetrical in size  Skin: RLE w/crusting; no exudates, drainage, or erythema      MEDICATIONS:  MEDICATIONS  (STANDING):  aspirin  chewable 81 milliGRAM(s) Oral daily  carvedilol 3.125 milliGRAM(s) Oral every 12 hours  chlorhexidine 0.12% Liquid 15 milliLiter(s) Oral Mucosa every 12 hours  chlorhexidine 2% Cloths 1 Application(s) Topical daily  clopidogrel Tablet 75 milliGRAM(s) Oral daily  dextrose 40% Gel 15 Gram(s) Oral once  dextrose 5%. 1000 milliLiter(s) (50 mL/Hr) IV Continuous <Continuous>  dextrose 5%. 1000 milliLiter(s) (100 mL/Hr) IV Continuous <Continuous>  dextrose 50% Injectable 25 Gram(s) IV Push once  dextrose 50% Injectable 12.5 Gram(s) IV Push once  dextrose 50% Injectable 25 Gram(s) IV Push once  glucagon  Injectable 1 milliGRAM(s) IntraMuscular once  heparin  Infusion.  Unit(s)/Hr (11 mL/Hr) IV Continuous <Continuous>  insulin lispro (ADMELOG) corrective regimen sliding scale   SubCutaneous every 6 hours  insulin NPH human recombinant 5 Unit(s) SubCutaneous every 6 hours  norepinephrine Infusion 0.05 MICROgram(s)/kG/Min (5.32 mL/Hr) IV Continuous <Continuous>  petrolatum Ophthalmic Ointment 1 Application(s) Both EYES two times a day    MEDICATIONS  (PRN):  heparin   Injectable 4500 Unit(s) IV Push every 6 hours PRN For aPTT less than 40  heparin   Injectable 2000 Unit(s) IV Push every 6 hours PRN For aPTT between 40 - 57  hydrALAZINE Injectable 5 milliGRAM(s) IV Push every 6 hours PRN for SBP >160      ALLERGIES:  Allergies    dyes,iv dyes (Unknown)  iodinated radiocontrast agents (Hives)    Intolerances        LABS:                        11.6   11.94 )-----------( 143      ( 25 Jun 2021 00:34 )             36.2     Hemoglobin: 11.6 g/dL (06-25 @ 00:34)  Hemoglobin: 11.7 g/dL (06-24 @ 01:11)  Hemoglobin: 11.1 g/dL (06-23 @ 02:21)  Hemoglobin: 12.1 g/dL (06-22 @ 03:52)  Hemoglobin: 11.9 g/dL (06-21 @ 05:29)    CBC Full  -  ( 25 Jun 2021 00:34 )  WBC Count : 11.94 K/uL  RBC Count : 4.00 M/uL  Hemoglobin : 11.6 g/dL  Hematocrit : 36.2 %  Platelet Count - Automated : 143 K/uL  Mean Cell Volume : 90.5 fL  Mean Cell Hemoglobin : 29.0 pg  Mean Cell Hemoglobin Concentration : 32.0 gm/dL  Auto Neutrophil # : x  Auto Lymphocyte # : x  Auto Monocyte # : x  Auto Eosinophil # : x  Auto Basophil # : x  Auto Neutrophil % : x  Auto Lymphocyte % : x  Auto Monocyte % : x  Auto Eosinophil % : x  Auto Basophil % : x    06-25    141  |  95<L>  |  66<H>  ----------------------------<  145<H>  3.6   |  31  |  3.04<H>    Ca    10.1      25 Jun 2021 00:34  Phos  5.9     06-25  Mg     2.0     06-25    TPro  6.3  /  Alb  2.7<L>  /  TBili  0.7  /  DBili  x   /  AST  72<H>  /  ALT  334<H>  /  AlkPhos  174<H>  06-25    Creatinine Trend: 3.04<--, 2.97<--, 2.81<--, 2.75<--, 2.77<--, 2.78<--  LIVER FUNCTIONS - ( 25 Jun 2021 00:34 )  Alb: 2.7 g/dL / Pro: 6.3 g/dL / ALK PHOS: 174 U/L / ALT: 334 U/L / AST: 72 U/L / GGT: x           PT/INR - ( 25 Jun 2021 00:34 )   PT: 16.2 sec;   INR: 1.45 ratio         PTT - ( 25 Jun 2021 00:34 )  PTT:142.6 sec    hs Troponin:    ABG - ( 24 Jun 2021 01:11 )  pH, Arterial: 7.54  pH, Blood: x     /  pCO2: 38    /  pO2: 149   / HCO3: 33    / Base Excess: 8.9   /  SaO2: 99.5

## 2021-06-25 NOTE — PROGRESS NOTE ADULT - SUBJECTIVE AND OBJECTIVE BOX
Patient seen and examined at bedside.    Overnight Events:   unable to follow command on CPAP trial, remains intubated, CPAP trial today per MICU team   remains on pressor     REVIEW OF SYSTEMS:  [x] Unable to assess ROS due to mental status     Current Meds:  aspirin  chewable 81 milliGRAM(s) Oral daily  carvedilol 3.125 milliGRAM(s) Oral every 12 hours  chlorhexidine 0.12% Liquid 15 milliLiter(s) Oral Mucosa every 12 hours  chlorhexidine 2% Cloths 1 Application(s) Topical daily  clopidogrel Tablet 75 milliGRAM(s) Oral daily  dextrose 40% Gel 15 Gram(s) Oral once  dextrose 5%. 1000 milliLiter(s) IV Continuous <Continuous>  dextrose 5%. 1000 milliLiter(s) IV Continuous <Continuous>  dextrose 50% Injectable 25 Gram(s) IV Push once  dextrose 50% Injectable 12.5 Gram(s) IV Push once  dextrose 50% Injectable 25 Gram(s) IV Push once  glucagon  Injectable 1 milliGRAM(s) IntraMuscular once  heparin   Injectable 4500 Unit(s) IV Push every 6 hours PRN  heparin   Injectable 2000 Unit(s) IV Push every 6 hours PRN  heparin  Infusion.  Unit(s)/Hr IV Continuous <Continuous>  hydrALAZINE Injectable 5 milliGRAM(s) IV Push every 6 hours PRN  insulin lispro (ADMELOG) corrective regimen sliding scale   SubCutaneous every 6 hours  insulin NPH human recombinant 5 Unit(s) SubCutaneous every 6 hours  norepinephrine Infusion 0.05 MICROgram(s)/kG/Min IV Continuous <Continuous>  petrolatum Ophthalmic Ointment 1 Application(s) Both EYES two times a day      PAST MEDICAL & SURGICAL HISTORY:  HTN (hypertension)    Diabetes, type I        Vitals:  T(F): 100 (06-25), Max: 100 (06-25)  HR: 67 (06-25) (60 - 83)  BP: 133/63 (06-25) (77/42 - 164/71)  RR: 17 (06-25)  SpO2: 100% (06-25)  I&O's Summary    24 Jun 2021 07:01  -  25 Jun 2021 07:00  --------------------------------------------------------  IN: 452.5 mL / OUT: 2425 mL / NET: -1972.5 mL        Physical Exam:  NAD, intubated but awake   No JVD   CTABL anteriorly   RRR, no MRG   Soft NT   Trace edema                           11.6   11.94 )-----------( 143      ( 25 Jun 2021 00:34 )             36.2     06-25    141  |  95<L>  |  66<H>  ----------------------------<  145<H>  3.6   |  31  |  3.04<H>    Ca    10.1      25 Jun 2021 00:34  Phos  5.9     06-25  Mg     2.0     06-25    TPro  6.3  /  Alb  2.7<L>  /  TBili  0.7  /  DBili  x   /  AST  72<H>  /  ALT  334<H>  /  AlkPhos  174<H>  06-25    PT/INR - ( 25 Jun 2021 00:34 )   PT: 16.2 sec;   INR: 1.45 ratio         PTT - ( 25 Jun 2021 00:34 )  PTT:142.6 sec      Serum Pro-Brain Natriuretic Peptide: 96719 pg/mL (06-20 @ 19:52)          Cardiovascular Testings:       Interpretation of Telemetry: sinus tachycardia

## 2021-06-25 NOTE — PROGRESS NOTE ADULT - ASSESSMENT
Pt with LINDSEY and metabolic acidosis    LINDSEY (non oliguric)/ ATN on possible CKD-  Pt with hemodynamically mediated LINDSEY/ ATN in the setting of SIRS, STEMI, DKA and medication use (Telmisartan), osmotic & post-ATN diuresis. No previous labs for review. On admission, SCr elevated at 2.09 (6/20). Peaked to 2.78 on 6/21/21. Today remains stable elevated at 3.04.  UA dirty with proteinuria, hematuria, pyuria.   Spot urine TP/CR 4.6.  Currently has a Lilly. Appears euvolemic on exam.  Bumex IV infusion on hold. UOP 2.4L in 24 hrs. UOP has been 2.5 to 5 L for the past few days. Consider giving IVF.  Hold Telmisartan. Monitor labs and urine output. Avoid NSAIDs, ACEI/ARBS, RCA and nephrotoxins. Dose medications as per eGFR.      Metabolic acidosis with concomitant respiratory acidosis-   Pt initially with anion gap metabolic and lactic acidosis in the setting of likely DKA (now resolved) with LINDSEY. On admission, Serum CO2 low at 8 with lactate 11.7 and pH: 7.04. Pt received IV sodium bicarbonate along with management of DKA.  Repeat ABG yesterday with 7.52/37/31. Now with metabolic & respiratory alkalosis. Serum CO2: 23. Respiratory alkalosis on ventilator. Metabolic alkalosis from diuresis. Continue to hold diuretics. Consider giving IVF. Would continue to monitor pH and serum CO2 levels for now.         If you have any questions, please feel free to contact me  Tatiana Chandler  Nephrology Fellow  [phone]  (After 5pm or on weekends please page the on-call fellow)       Pt with LINDSEY and metabolic acidosis    LINDSEY (non oliguric)/ ATN on possible CKD-  Pt with hemodynamically mediated LINDSEY/ ATN in the setting of SIRS, STEMI, DKA and medication use (Telmisartan), osmotic & post-ATN diuresis. No previous labs for review. On admission, SCr elevated at 2.09 (6/20). Peaked to 2.78 on 6/21/21. Today remains stable elevated at 3.04.  UA with proteinuria, hematuria, pyuria.   Spot urine TP/CR 4.6.  Currently has a Lilly. Appears euvolemic on exam.  Bumex IV infusion on hold. UOP 2.4L in 24 hrs. UOP has been 2.5 to 5 L for the past few days. Consider giving IVF.  Hold Telmisartan. Monitor labs and urine output. Avoid NSAIDs, ACEI/ARBS, RCA and nephrotoxins. Dose medications as per eGFR.      Metabolic acidosis with concomitant respiratory acidosis-   Pt initially with anion gap metabolic and lactic acidosis in the setting of likely DKA (now resolved) with LINDSEY. On admission, Serum CO2 low at 8 with lactate 11.7 and pH: 7.04. Pt received IV sodium bicarbonate along with management of DKA.  Repeat ABG yesterday with 7.52/37/31. Now with metabolic & respiratory alkalosis. Serum CO2: 23. Respiratory alkalosis on ventilator. Metabolic alkalosis from diuresis. Continue to hold diuretics. Consider giving IVF. Would continue to monitor pH and serum CO2 levels for now.     Hypercalcemia -- corrected calcium is 11.14.  Recommend check vitamin D 25 and 1,25 as well as intact PTH and immunofixation.    If you have any questions, please feel free to contact me  Tatiana Chandler  Nephrology Fellow  [phone]  (After 5pm or on weekends please page the on-call fellow)

## 2021-06-26 DIAGNOSIS — E87.3 ALKALOSIS: ICD-10-CM

## 2021-06-26 LAB
ALBUMIN SERPL ELPH-MCNC: 2.8 G/DL — LOW (ref 3.3–5)
ALP SERPL-CCNC: 183 U/L — HIGH (ref 40–120)
ALT FLD-CCNC: 215 U/L — HIGH (ref 4–41)
ANION GAP SERPL CALC-SCNC: 16 MMOL/L — HIGH (ref 7–14)
APTT BLD: 87.3 SEC — HIGH (ref 27–36.3)
APTT BLD: 88.3 SEC — HIGH (ref 27–36.3)
AST SERPL-CCNC: 49 U/L — HIGH (ref 4–40)
BILIRUB SERPL-MCNC: 0.7 MG/DL — SIGNIFICANT CHANGE UP (ref 0.2–1.2)
BLOOD GAS ARTERIAL COMPREHENSIVE RESULT: SIGNIFICANT CHANGE UP
BUN SERPL-MCNC: 78 MG/DL — HIGH (ref 7–23)
CALCIUM SERPL-MCNC: 9.5 MG/DL — SIGNIFICANT CHANGE UP (ref 8.4–10.5)
CHLORIDE SERPL-SCNC: 95 MMOL/L — LOW (ref 98–107)
CO2 SERPL-SCNC: 32 MMOL/L — HIGH (ref 22–31)
CREAT SERPL-MCNC: 2.91 MG/DL — HIGH (ref 0.5–1.3)
CULTURE RESULTS: SIGNIFICANT CHANGE UP
GLUCOSE BLDC GLUCOMTR-MCNC: 175 MG/DL — HIGH (ref 70–99)
GLUCOSE BLDC GLUCOMTR-MCNC: 191 MG/DL — HIGH (ref 70–99)
GLUCOSE BLDC GLUCOMTR-MCNC: 208 MG/DL — HIGH (ref 70–99)
GLUCOSE BLDC GLUCOMTR-MCNC: 97 MG/DL — SIGNIFICANT CHANGE UP (ref 70–99)
GLUCOSE SERPL-MCNC: 133 MG/DL — HIGH (ref 70–99)
HCT VFR BLD CALC: 36.2 % — LOW (ref 39–50)
HGB BLD-MCNC: 11.5 G/DL — LOW (ref 13–17)
INR BLD: 1.36 RATIO — HIGH (ref 0.88–1.16)
MAGNESIUM SERPL-MCNC: 2.1 MG/DL — SIGNIFICANT CHANGE UP (ref 1.6–2.6)
MCHC RBC-ENTMCNC: 29.3 PG — SIGNIFICANT CHANGE UP (ref 27–34)
MCHC RBC-ENTMCNC: 31.8 GM/DL — LOW (ref 32–36)
MCV RBC AUTO: 92.1 FL — SIGNIFICANT CHANGE UP (ref 80–100)
NRBC # BLD: 0 /100 WBCS — SIGNIFICANT CHANGE UP
NRBC # FLD: 0 K/UL — SIGNIFICANT CHANGE UP
PHOSPHATE SERPL-MCNC: 5.2 MG/DL — HIGH (ref 2.5–4.5)
PLATELET # BLD AUTO: 155 K/UL — SIGNIFICANT CHANGE UP (ref 150–400)
POTASSIUM SERPL-MCNC: 3.6 MMOL/L — SIGNIFICANT CHANGE UP (ref 3.5–5.3)
POTASSIUM SERPL-SCNC: 3.6 MMOL/L — SIGNIFICANT CHANGE UP (ref 3.5–5.3)
PROT SERPL-MCNC: 6.5 G/DL — SIGNIFICANT CHANGE UP (ref 6–8.3)
PROTHROM AB SERPL-ACNC: 15.3 SEC — HIGH (ref 10.6–13.6)
RBC # BLD: 3.93 M/UL — LOW (ref 4.2–5.8)
RBC # FLD: 14.3 % — SIGNIFICANT CHANGE UP (ref 10.3–14.5)
SODIUM SERPL-SCNC: 143 MMOL/L — SIGNIFICANT CHANGE UP (ref 135–145)
SPECIMEN SOURCE: SIGNIFICANT CHANGE UP
WBC # BLD: 10.7 K/UL — HIGH (ref 3.8–10.5)
WBC # FLD AUTO: 10.7 K/UL — HIGH (ref 3.8–10.5)

## 2021-06-26 PROCEDURE — 99291 CRITICAL CARE FIRST HOUR: CPT

## 2021-06-26 PROCEDURE — 76937 US GUIDE VASCULAR ACCESS: CPT | Mod: 26

## 2021-06-26 PROCEDURE — 36600 WITHDRAWAL OF ARTERIAL BLOOD: CPT

## 2021-06-26 PROCEDURE — 99233 SBSQ HOSP IP/OBS HIGH 50: CPT | Mod: GC

## 2021-06-26 RX ORDER — SODIUM CHLORIDE 9 MG/ML
500 INJECTION, SOLUTION INTRAVENOUS
Refills: 0 | Status: DISCONTINUED | OUTPATIENT
Start: 2021-06-26 | End: 2021-06-28

## 2021-06-26 RX ORDER — HUMAN INSULIN 100 [IU]/ML
11 INJECTION, SUSPENSION SUBCUTANEOUS EVERY 6 HOURS
Refills: 0 | Status: DISCONTINUED | OUTPATIENT
Start: 2021-06-26 | End: 2021-06-27

## 2021-06-26 RX ADMIN — Medication 1: at 17:42

## 2021-06-26 RX ADMIN — CARVEDILOL PHOSPHATE 3.12 MILLIGRAM(S): 80 CAPSULE, EXTENDED RELEASE ORAL at 17:01

## 2021-06-26 RX ADMIN — Medication 81 MILLIGRAM(S): at 12:11

## 2021-06-26 RX ADMIN — HUMAN INSULIN 11 UNIT(S): 100 INJECTION, SUSPENSION SUBCUTANEOUS at 17:08

## 2021-06-26 RX ADMIN — HUMAN INSULIN 5 UNIT(S): 100 INJECTION, SUSPENSION SUBCUTANEOUS at 05:24

## 2021-06-26 RX ADMIN — HUMAN INSULIN 11 UNIT(S): 100 INJECTION, SUSPENSION SUBCUTANEOUS at 11:28

## 2021-06-26 RX ADMIN — CHLORHEXIDINE GLUCONATE 1 APPLICATION(S): 213 SOLUTION TOPICAL at 17:05

## 2021-06-26 RX ADMIN — HUMAN INSULIN 11 UNIT(S): 100 INJECTION, SUSPENSION SUBCUTANEOUS at 23:15

## 2021-06-26 RX ADMIN — HEPARIN SODIUM 900 UNIT(S)/HR: 5000 INJECTION INTRAVENOUS; SUBCUTANEOUS at 09:00

## 2021-06-26 RX ADMIN — Medication 1: at 11:30

## 2021-06-26 RX ADMIN — Medication 2: at 05:24

## 2021-06-26 RX ADMIN — HEPARIN SODIUM 900 UNIT(S)/HR: 5000 INJECTION INTRAVENOUS; SUBCUTANEOUS at 01:33

## 2021-06-26 RX ADMIN — CLOPIDOGREL BISULFATE 75 MILLIGRAM(S): 75 TABLET, FILM COATED ORAL at 11:27

## 2021-06-26 NOTE — PROGRESS NOTE ADULT - SUBJECTIVE AND OBJECTIVE BOX
INTERVAL HPI/OVERNIGHT EVENTS:    SUBJECTIVE: Patient seen and examined at bedside.     OBJECTIVE:    VITAL SIGNS:  ICU Vital Signs Last 24 Hrs  T(C): 37.2 (26 Jun 2021 08:00), Max: 37.9 (26 Jun 2021 04:00)  T(F): 99 (26 Jun 2021 08:00), Max: 100.2 (26 Jun 2021 04:00)  HR: 75 (26 Jun 2021 07:38) (64 - 85)  BP: 153/68 (26 Jun 2021 07:00) (92/47 - 172/75)  BP(mean): 89 (26 Jun 2021 07:00) (58 - 97)  ABP: --  ABP(mean): --  RR: 15 (26 Jun 2021 07:00) (10 - 24)  SpO2: 100% (26 Jun 2021 07:38) (97% - 100%)    Mode: AC/ CMV (Assist Control/ Continuous Mandatory Ventilation), RR (machine): 10, TV (machine): 300, FiO2: 30, PEEP: 5, ITime: 0.86, MAP: 7, PIP: 16    06-25 @ 07:01  -  06-26 @ 07:00  --------------------------------------------------------  IN: 1633.1 mL / OUT: 1975 mL / NET: -341.9 mL    06-26 @ 07:01 - 06-26 @ 08:15  --------------------------------------------------------  IN: 9 mL / OUT: 125 mL / NET: -116 mL      CAPILLARY BLOOD GLUCOSE      POCT Blood Glucose.: 208 mg/dL (26 Jun 2021 05:20)      PHYSICAL EXAM:    General: NAD  HEENT: NC/AT; PERRL, clear conjunctiva  Neck: supple  Respiratory: CTA b/l  Cardiovascular: +S1/S2; RRR  Abdomen: soft, NT/ND; +BS x4  Extremities: WWP, 2+ peripheral pulses b/l; no LE edema  Skin: normal color and turgor; no rash  Neurological:    MEDICATIONS:  MEDICATIONS  (STANDING):  aspirin  chewable 81 milliGRAM(s) Oral daily  carvedilol 3.125 milliGRAM(s) Oral every 12 hours  chlorhexidine 2% Cloths 1 Application(s) Topical daily  clopidogrel Tablet 75 milliGRAM(s) Oral daily  dextrose 40% Gel 15 Gram(s) Oral once  dextrose 5%. 1000 milliLiter(s) (50 mL/Hr) IV Continuous <Continuous>  dextrose 5%. 1000 milliLiter(s) (100 mL/Hr) IV Continuous <Continuous>  dextrose 50% Injectable 25 Gram(s) IV Push once  dextrose 50% Injectable 12.5 Gram(s) IV Push once  dextrose 50% Injectable 25 Gram(s) IV Push once  glucagon  Injectable 1 milliGRAM(s) IntraMuscular once  heparin  Infusion. 900 Unit(s)/Hr (9 mL/Hr) IV Continuous <Continuous>  insulin lispro (ADMELOG) corrective regimen sliding scale   SubCutaneous every 6 hours  insulin NPH human recombinant 11 Unit(s) SubCutaneous every 6 hours  norepinephrine Infusion 0.05 MICROgram(s)/kG/Min (5.32 mL/Hr) IV Continuous <Continuous>    MEDICATIONS  (PRN):  heparin   Injectable 4500 Unit(s) IV Push every 6 hours PRN For aPTT less than 40  heparin   Injectable 2000 Unit(s) IV Push every 6 hours PRN For aPTT between 40 - 57      ALLERGIES:  Allergies    dyes,iv dyes (Unknown)  iodinated radiocontrast agents (Hives)    Intolerances        LABS:                        11.5   10.70 )-----------( 155      ( 26 Jun 2021 00:54 )             36.2     Hemoglobin: 11.5 g/dL (06-26 @ 00:54)  Hemoglobin: 11.6 g/dL (06-25 @ 00:34)  Hemoglobin: 11.7 g/dL (06-24 @ 01:11)  Hemoglobin: 11.1 g/dL (06-23 @ 02:21)  Hemoglobin: 12.1 g/dL (06-22 @ 03:52)    CBC Full  -  ( 26 Jun 2021 00:54 )  WBC Count : 10.70 K/uL  RBC Count : 3.93 M/uL  Hemoglobin : 11.5 g/dL  Hematocrit : 36.2 %  Platelet Count - Automated : 155 K/uL  Mean Cell Volume : 92.1 fL  Mean Cell Hemoglobin : 29.3 pg  Mean Cell Hemoglobin Concentration : 31.8 gm/dL  Auto Neutrophil # : x  Auto Lymphocyte # : x  Auto Monocyte # : x  Auto Eosinophil # : x  Auto Basophil # : x  Auto Neutrophil % : x  Auto Lymphocyte % : x  Auto Monocyte % : x  Auto Eosinophil % : x  Auto Basophil % : x    06-26    143  |  95<L>  |  78<H>  ----------------------------<  133<H>  3.6   |  32<H>  |  2.91<H>    Ca    9.5      26 Jun 2021 00:54  Phos  5.2     06-26  Mg     2.1     06-26    TPro  6.5  /  Alb  2.8<L>  /  TBili  0.7  /  DBili  x   /  AST  49<H>  /  ALT  215<H>  /  AlkPhos  183<H>  06-26    Creatinine Trend: 2.91<--, 3.04<--, 2.97<--, 2.81<--, 2.75<--, 2.77<--  LIVER FUNCTIONS - ( 26 Jun 2021 00:54 )  Alb: 2.8 g/dL / Pro: 6.5 g/dL / ALK PHOS: 183 U/L / ALT: 215 U/L / AST: 49 U/L / GGT: x           PT/INR - ( 26 Jun 2021 00:54 )   PT: 15.3 sec;   INR: 1.36 ratio         PTT - ( 26 Jun 2021 00:54 )  PTT:87.3 sec    hs Troponin:    ABG - ( 26 Jun 2021 04:09 )  pH, Arterial: 7.52  pH, Blood: x     /  pCO2: 46    /  pO2: 87    / HCO3: 36    / Base Excess: 13.1  /  SaO2: 97.5                04:09 - ABG - pH: 7.52  |  pCO2: 46    |  pO2: 87    | Lactate:       | BE: 13.1   17:03 - ABG - pH: 7.51  |  pCO2: 44    |  pO2: 136   | Lactate:       | BE: 11.7         CSF:                      EKG:   MICROBIOLOGY:    IMAGING:      Labs, imaging, EKG personally reviewed    RADIOLOGY & ADDITIONAL TESTS: Reviewed.     INTERVAL HPI/OVERNIGHT EVENTS:    SUBJECTIVE: Patient seen and examined at bedside.     - Extubated yesterday  - Levo stopped overnight, patient on heparin gtt  - Patient was on BIPAP overnight but currently on RA and doing well  - Started tube feeds and insulin    OBJECTIVE:    VITAL SIGNS:  ICU Vital Signs Last 24 Hrs  T(C): 37.2 (26 Jun 2021 08:00), Max: 37.9 (26 Jun 2021 04:00)  T(F): 99 (26 Jun 2021 08:00), Max: 100.2 (26 Jun 2021 04:00)  HR: 75 (26 Jun 2021 07:38) (64 - 85)  BP: 153/68 (26 Jun 2021 07:00) (92/47 - 172/75)  BP(mean): 89 (26 Jun 2021 07:00) (58 - 97)  ABP: --  ABP(mean): --  RR: 15 (26 Jun 2021 07:00) (10 - 24)  SpO2: 100% (26 Jun 2021 07:38) (97% - 100%)    Mode: AC/ CMV (Assist Control/ Continuous Mandatory Ventilation), RR (machine): 10, TV (machine): 300, FiO2: 30, PEEP: 5, ITime: 0.86, MAP: 7, PIP: 16    06-25 @ 07:01  -  06-26 @ 07:00  --------------------------------------------------------  IN: 1633.1 mL / OUT: 1975 mL / NET: -341.9 mL    06-26 @ 07:01  -  06-26 @ 08:15  --------------------------------------------------------  IN: 9 mL / OUT: 125 mL / NET: -116 mL      CAPILLARY BLOOD GLUCOSE      POCT Blood Glucose.: 208 mg/dL (26 Jun 2021 05:20)      PHYSICAL EXAM:    General: NAD  EYES: conjunctiva clear  Respiratory: intubated on minimal settings; Breath sounds are clear bilaterally, No wheezing, rales or rhonchi  Cardiovascular: S1 and S2, regular rate and rhythm, no Murmurs, gallops or rubs, no JVD,    Gastrointestinal: Bowel Sounds present, soft, nontender, nondistended, no guarding, no rebound  Extremities: No cyanosis or clubbing; warm to touch  Vascular: 2+ peripheral pulses lower ex  Neurological: awakens to voice; daily improvement in neuro status noted; does not follow commands; does not spontaneously open eyes; Pupils are equally reactive to light and symmetrical in size  Skin: RLE w/crusting; no exudates, drainage, or erythema    MEDICATIONS:  MEDICATIONS  (STANDING):  aspirin  chewable 81 milliGRAM(s) Oral daily  carvedilol 3.125 milliGRAM(s) Oral every 12 hours  chlorhexidine 2% Cloths 1 Application(s) Topical daily  clopidogrel Tablet 75 milliGRAM(s) Oral daily  dextrose 40% Gel 15 Gram(s) Oral once  dextrose 5%. 1000 milliLiter(s) (50 mL/Hr) IV Continuous <Continuous>  dextrose 5%. 1000 milliLiter(s) (100 mL/Hr) IV Continuous <Continuous>  dextrose 50% Injectable 25 Gram(s) IV Push once  dextrose 50% Injectable 12.5 Gram(s) IV Push once  dextrose 50% Injectable 25 Gram(s) IV Push once  glucagon  Injectable 1 milliGRAM(s) IntraMuscular once  heparin  Infusion. 900 Unit(s)/Hr (9 mL/Hr) IV Continuous <Continuous>  insulin lispro (ADMELOG) corrective regimen sliding scale   SubCutaneous every 6 hours  insulin NPH human recombinant 11 Unit(s) SubCutaneous every 6 hours  norepinephrine Infusion 0.05 MICROgram(s)/kG/Min (5.32 mL/Hr) IV Continuous <Continuous>    MEDICATIONS  (PRN):  heparin   Injectable 4500 Unit(s) IV Push every 6 hours PRN For aPTT less than 40  heparin   Injectable 2000 Unit(s) IV Push every 6 hours PRN For aPTT between 40 - 57      ALLERGIES:  Allergies    dyes,iv dyes (Unknown)  iodinated radiocontrast agents (Hives)    Intolerances        LABS:                        11.5   10.70 )-----------( 155      ( 26 Jun 2021 00:54 )             36.2     Hemoglobin: 11.5 g/dL (06-26 @ 00:54)  Hemoglobin: 11.6 g/dL (06-25 @ 00:34)  Hemoglobin: 11.7 g/dL (06-24 @ 01:11)  Hemoglobin: 11.1 g/dL (06-23 @ 02:21)  Hemoglobin: 12.1 g/dL (06-22 @ 03:52)    CBC Full  -  ( 26 Jun 2021 00:54 )  WBC Count : 10.70 K/uL  RBC Count : 3.93 M/uL  Hemoglobin : 11.5 g/dL  Hematocrit : 36.2 %  Platelet Count - Automated : 155 K/uL  Mean Cell Volume : 92.1 fL  Mean Cell Hemoglobin : 29.3 pg  Mean Cell Hemoglobin Concentration : 31.8 gm/dL  Auto Neutrophil # : x  Auto Lymphocyte # : x  Auto Monocyte # : x  Auto Eosinophil # : x  Auto Basophil # : x  Auto Neutrophil % : x  Auto Lymphocyte % : x  Auto Monocyte % : x  Auto Eosinophil % : x  Auto Basophil % : x    06-26    143  |  95<L>  |  78<H>  ----------------------------<  133<H>  3.6   |  32<H>  |  2.91<H>    Ca    9.5      26 Jun 2021 00:54  Phos  5.2     06-26  Mg     2.1     06-26    TPro  6.5  /  Alb  2.8<L>  /  TBili  0.7  /  DBili  x   /  AST  49<H>  /  ALT  215<H>  /  AlkPhos  183<H>  06-26    Creatinine Trend: 2.91<--, 3.04<--, 2.97<--, 2.81<--, 2.75<--, 2.77<--  LIVER FUNCTIONS - ( 26 Jun 2021 00:54 )  Alb: 2.8 g/dL / Pro: 6.5 g/dL / ALK PHOS: 183 U/L / ALT: 215 U/L / AST: 49 U/L / GGT: x           PT/INR - ( 26 Jun 2021 00:54 )   PT: 15.3 sec;   INR: 1.36 ratio         PTT - ( 26 Jun 2021 00:54 )  PTT:87.3 sec    hs Troponin:    ABG - ( 26 Jun 2021 04:09 )  pH, Arterial: 7.52  pH, Blood: x     /  pCO2: 46    /  pO2: 87    / HCO3: 36    / Base Excess: 13.1  /  SaO2: 97.5                04:09 - ABG - pH: 7.52  |  pCO2: 46    |  pO2: 87    | Lactate:       | BE: 13.1   17:03 - ABG - pH: 7.51  |  pCO2: 44    |  pO2: 136   | Lactate:       | BE: 11.7          INTERVAL HPI/OVERNIGHT EVENTS:    SUBJECTIVE: Patient seen and examined at bedside.     - Extubated yesterday  - Levo stopped overnight, patient on heparin gtt  - Patient was on BIPAP overnight but currently on RA and doing well  - Started tube feeds and insulin    OBJECTIVE:    VITAL SIGNS:  ICU Vital Signs Last 24 Hrs  T(C): 37.2 (26 Jun 2021 08:00), Max: 37.9 (26 Jun 2021 04:00)  T(F): 99 (26 Jun 2021 08:00), Max: 100.2 (26 Jun 2021 04:00)  HR: 75 (26 Jun 2021 07:38) (64 - 85)  BP: 153/68 (26 Jun 2021 07:00) (92/47 - 172/75)  BP(mean): 89 (26 Jun 2021 07:00) (58 - 97)  ABP: --  ABP(mean): --  RR: 15 (26 Jun 2021 07:00) (10 - 24)  SpO2: 100% (26 Jun 2021 07:38) (97% - 100%)    Mode: AC/ CMV (Assist Control/ Continuous Mandatory Ventilation), RR (machine): 10, TV (machine): 300, FiO2: 30, PEEP: 5, ITime: 0.86, MAP: 7, PIP: 16    06-25 @ 07:01  -  06-26 @ 07:00  --------------------------------------------------------  IN: 1633.1 mL / OUT: 1975 mL / NET: -341.9 mL    06-26 @ 07:01  -  06-26 @ 08:15  --------------------------------------------------------  IN: 9 mL / OUT: 125 mL / NET: -116 mL      CAPILLARY BLOOD GLUCOSE      POCT Blood Glucose.: 208 mg/dL (26 Jun 2021 05:20)      PHYSICAL EXAM:    General: NAD  EYES: conjunctiva clear  Respiratory: On RA; Breath sounds are clear bilaterally, No wheezing, rales or rhonchi  Cardiovascular: S1 and S2, regular rate and rhythm, no Murmurs, gallops or rubs, no JVD,    Gastrointestinal: Bowel Sounds present, soft, nontender, nondistended, no guarding, no rebound  Extremities: No cyanosis or clubbing; warm to touch  Vascular: 2+ peripheral pulses lower ex  Neurological: Alert and Awake, follows commands; Pupils are equally reactive to light and symmetrical in size  Skin: RLE w/crusting; no exudates, drainage, or erythema    MEDICATIONS:  MEDICATIONS  (STANDING):  aspirin  chewable 81 milliGRAM(s) Oral daily  carvedilol 3.125 milliGRAM(s) Oral every 12 hours  chlorhexidine 2% Cloths 1 Application(s) Topical daily  clopidogrel Tablet 75 milliGRAM(s) Oral daily  dextrose 40% Gel 15 Gram(s) Oral once  dextrose 5%. 1000 milliLiter(s) (50 mL/Hr) IV Continuous <Continuous>  dextrose 5%. 1000 milliLiter(s) (100 mL/Hr) IV Continuous <Continuous>  dextrose 50% Injectable 25 Gram(s) IV Push once  dextrose 50% Injectable 12.5 Gram(s) IV Push once  dextrose 50% Injectable 25 Gram(s) IV Push once  glucagon  Injectable 1 milliGRAM(s) IntraMuscular once  heparin  Infusion. 900 Unit(s)/Hr (9 mL/Hr) IV Continuous <Continuous>  insulin lispro (ADMELOG) corrective regimen sliding scale   SubCutaneous every 6 hours  insulin NPH human recombinant 11 Unit(s) SubCutaneous every 6 hours  norepinephrine Infusion 0.05 MICROgram(s)/kG/Min (5.32 mL/Hr) IV Continuous <Continuous>    MEDICATIONS  (PRN):  heparin   Injectable 4500 Unit(s) IV Push every 6 hours PRN For aPTT less than 40  heparin   Injectable 2000 Unit(s) IV Push every 6 hours PRN For aPTT between 40 - 57      ALLERGIES:  Allergies    dyes,iv dyes (Unknown)  iodinated radiocontrast agents (Hives)    Intolerances        LABS:                        11.5   10.70 )-----------( 155      ( 26 Jun 2021 00:54 )             36.2     Hemoglobin: 11.5 g/dL (06-26 @ 00:54)  Hemoglobin: 11.6 g/dL (06-25 @ 00:34)  Hemoglobin: 11.7 g/dL (06-24 @ 01:11)  Hemoglobin: 11.1 g/dL (06-23 @ 02:21)  Hemoglobin: 12.1 g/dL (06-22 @ 03:52)    CBC Full  -  ( 26 Jun 2021 00:54 )  WBC Count : 10.70 K/uL  RBC Count : 3.93 M/uL  Hemoglobin : 11.5 g/dL  Hematocrit : 36.2 %  Platelet Count - Automated : 155 K/uL  Mean Cell Volume : 92.1 fL  Mean Cell Hemoglobin : 29.3 pg  Mean Cell Hemoglobin Concentration : 31.8 gm/dL  Auto Neutrophil # : x  Auto Lymphocyte # : x  Auto Monocyte # : x  Auto Eosinophil # : x  Auto Basophil # : x  Auto Neutrophil % : x  Auto Lymphocyte % : x  Auto Monocyte % : x  Auto Eosinophil % : x  Auto Basophil % : x    06-26    143  |  95<L>  |  78<H>  ----------------------------<  133<H>  3.6   |  32<H>  |  2.91<H>    Ca    9.5      26 Jun 2021 00:54  Phos  5.2     06-26  Mg     2.1     06-26    TPro  6.5  /  Alb  2.8<L>  /  TBili  0.7  /  DBili  x   /  AST  49<H>  /  ALT  215<H>  /  AlkPhos  183<H>  06-26    Creatinine Trend: 2.91<--, 3.04<--, 2.97<--, 2.81<--, 2.75<--, 2.77<--  LIVER FUNCTIONS - ( 26 Jun 2021 00:54 )  Alb: 2.8 g/dL / Pro: 6.5 g/dL / ALK PHOS: 183 U/L / ALT: 215 U/L / AST: 49 U/L / GGT: x           PT/INR - ( 26 Jun 2021 00:54 )   PT: 15.3 sec;   INR: 1.36 ratio         PTT - ( 26 Jun 2021 00:54 )  PTT:87.3 sec    hs Troponin:    ABG - ( 26 Jun 2021 04:09 )  pH, Arterial: 7.52  pH, Blood: x     /  pCO2: 46    /  pO2: 87    / HCO3: 36    / Base Excess: 13.1  /  SaO2: 97.5                04:09 - ABG - pH: 7.52  |  pCO2: 46    |  pO2: 87    | Lactate:       | BE: 13.1   17:03 - ABG - pH: 7.51  |  pCO2: 44    |  pO2: 136   | Lactate:       | BE: 11.7

## 2021-06-26 NOTE — PROGRESS NOTE ADULT - SUBJECTIVE AND OBJECTIVE BOX
[facility] DIVISION OF KIDNEY DISEASES AND HYPERTENSION -- FOLLOW UP NOTE  --------------------------------------------------------------------------------  Marcus Singh   Nephrology Fellow  Pager NS: [phone]/ [facility]: [phone]  (After 5 pm or on weekends please page the on-call fellow, can view the schedule on [employer]. Login is stanley larson, schedule under [facility] medicine, psych, derm.      Patient is a 85y old  Male who presents with a chief complaint of AMS, Hypoxia (25 Jun 2021 08:51)      24 hour events/subjective: Patient seen and examined at the bedside. Vital signs, labs, medications reviewed. Extubated yesterday, non-oliguric, 1.9L in 24hrs. Cr at 2.9mg/dl. pH improved to 7.52        PAST HISTORY  --------------------------------------------------------------------------------  No significant changes to PMH, PSH, FHx, SHx, unless otherwise noted    ALLERGIES & MEDICATIONS  --------------------------------------------------------------------------------  Allergies    dyes,iv dyes (Unknown)  iodinated radiocontrast agents (Hives)    Intolerances      Standing Inpatient Medications  aspirin  chewable 81 milliGRAM(s) Oral daily  carvedilol 3.125 milliGRAM(s) Oral every 12 hours  chlorhexidine 2% Cloths 1 Application(s) Topical daily  clopidogrel Tablet 75 milliGRAM(s) Oral daily  dextrose 40% Gel 15 Gram(s) Oral once  dextrose 5%. 1000 milliLiter(s) IV Continuous <Continuous>  dextrose 5%. 1000 milliLiter(s) IV Continuous <Continuous>  dextrose 50% Injectable 25 Gram(s) IV Push once  dextrose 50% Injectable 12.5 Gram(s) IV Push once  dextrose 50% Injectable 25 Gram(s) IV Push once  glucagon  Injectable 1 milliGRAM(s) IntraMuscular once  heparin  Infusion. 900 Unit(s)/Hr IV Continuous <Continuous>  insulin lispro (ADMELOG) corrective regimen sliding scale   SubCutaneous every 6 hours  insulin NPH human recombinant 11 Unit(s) SubCutaneous every 6 hours  norepinephrine Infusion 0.05 MICROgram(s)/kG/Min IV Continuous <Continuous>    PRN Inpatient Medications  heparin   Injectable 4500 Unit(s) IV Push every 6 hours PRN  heparin   Injectable 2000 Unit(s) IV Push every 6 hours PRN      REVIEW OF SYSTEMS  --------------------------------------------------------------------------------  Unable to obtain 2/2 respiratory distress    >>> <<<    VITALS/PHYSICAL EXAM  --------------------------------------------------------------------------------  T(C): 37.9 (06-26-21 @ 04:00), Max: 37.9 (06-25-21 @ 08:00)  HR: 75 (06-26-21 @ 07:38) (64 - 81)  BP: 138/58 (06-26-21 @ 06:00) (92/47 - 172/75)  RR: 17 (06-26-21 @ 06:00) (10 - 24)  SpO2: 100% (06-26-21 @ 07:38) (97% - 100%)  Wt(kg): --        06-25-21 @ 07:01  -  06-26-21 @ 07:00  --------------------------------------------------------  IN: 1594.1 mL / OUT: 1975 mL / NET: -380.9 mL      Physical Exam:    	Gen: mild resp distress on BIPAP  	HEENT: Anicteric  	Pulm: Coarse bs, on bipap  	CV: S1S2  	Abd: Soft, +BS   	MSK: No LE edema B/L  	Neuro: Awake  	Skin: Warm and dry  	      LABS/STUDIES  --------------------------------------------------------------------------------              11.5   10.70 >-----------<  155      [06-26-21 @ 00:54]              36.2     143  |  95  |  78  ----------------------------<  133      [06-26-21 @ 00:54]  3.6   |  32  |  2.91        Ca     9.5     [06-26-21 @ 00:54]      Mg     2.1     [06-26-21 @ 00:54]      Phos  5.2     [06-26-21 @ 00:54]    TPro  6.5  /  Alb  2.8  /  TBili  0.7  /  DBili  x   /  AST  49  /  ALT  215  /  AlkPhos  183  [06-26-21 @ 00:54]    PT/INR: PT 15.3 , INR 1.36       [06-26-21 @ 00:54]  PTT: 87.3       [06-26-21 @ 00:54]      Creatinine Trend:  SCr 2.91 [06-26 @ 00:54]  SCr 3.04 [06-25 @ 00:34]  SCr 2.97 [06-24 @ 01:11]  SCr 2.81 [06-23 @ 02:21]  SCr 2.75 [06-22 @ 17:15]    Urinalysis - [06-20-21 @ 16:48]      Color Yellow / Appearance Clear / SG 1.032 / pH 6.5      Gluc >= 1000 mg/dL / Ketone Trace  / Bili Negative / Urobili <2 mg/dL       Blood Moderate / Protein >600 mg/dL / Leuk Est Negative / Nitrite Negative      RBC 52 / WBC 9 / Hyaline 1 / Gran  / Sq Epi  / Non Sq Epi 8 / Bacteria Few    Urine Creatinine 7      [06-21-21 @ 14:43]  Urine Protein 32      [06-21-21 @ 14:43]

## 2021-06-26 NOTE — PROGRESS NOTE ADULT - PROBLEM SELECTOR PLAN 1
Pt with hemodynamically mediated LINDSEY/ ATN in the setting of SIRS, STEMI, DKA and medication use (Telmisartan). No previous labs for review. On admission, SCr elevated at 2.09 (6/20). Peaked to 3.04 on 6/25. Today remains slightly improved to 2.9mg/dl.  Initial UA with proteinuria, hematuria, pyuria. Spot urine TP/CR 4.6.  Currently has a [employer]. Appears euvolemic on exam.  Hold Telmisartan. Please repeat the UA today. Monitor labs and urine output. Avoid NSAIDs, ACEI/ARBS, RCA and nephrotoxins. Dose medications as per eGFR

## 2021-06-26 NOTE — PROGRESS NOTE ADULT - ASSESSMENT
85 year old male with hx of diabetes who comes in for AMS, found to be hypoxic and in DKA with additional oliguric renal failure, intubated and sedated 6/20 in the ED due to worsening mental status and inability to protect airway and now with STEMI on medical management    Neuro  #AMS, likely metabolic encephalopathy iso hypercapnia and metabolic acidosis iso renal failure  - off sedation; opens eyes to voice; patient following commands  - continues to be on minimal vent. setting  - CT head ordered, pending    Pulm   #hypercapnic respiratory failure likely iso metabolic encephalopathy and intubated 2/2 c/f airway protection  - Continue monitoring patient  - possible extubation later today if mental status continues to improve    Cardiac:   #STEMI in leads V1/V2 w/rising troponin and CKMB  -s/p ASA/plavix load; c/w ASA, Plavix; c/w hep gtt for 48h  -c/f ADHF given pleural effusions and BNP 50k though w/minimal O2 requirements on ventilator (PEEP 8/FiO2 30%); hold BB  -off bumex gtt since ~0800 on 6/22   -no plans for cardiac cath while pt intubated and not to baseline mental status, per cardiology    #L femoral-deep peroneal artery byspass (on prelim [facility] Dup[facility] arterial)  - 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 Will follow-up official consult     GI  #LFT elevation, likely shock liver given acute rise in LFTs from WNL to 3000/1600 iso hypoperfusion from MI vs. less likely congestive hepatopathy  - LFTs trending down  - continue to monitor  - OG tube in place; hold TF for potential extubation today    Renal:   #LINDSEY (no previous labs to assess baseline) c/b oliguric renal failure, likely ATN 2/2 hypoperfusion iso MI vs. hypovolemia prior to MI  - Cr uptrending likely iso ATN that is starting to plateau  - HAGMA 2/2 lactic acidosis  - UOP 2.34L off bumex for 48h; level of UOP iso post-ATN diuresis  - Patient given 1L of fluid given over 6hrs (2/25)    #metabolic alkalosis, likely contraction alkalosis  - HCO3 31 today  - Patient given 1L bolus (2/25)    Endo  #DKA, resolved  - AG present but w/o BHB or acidosis; AG likely iso renal failure  - on NPH 11 Q6H; or 5 Q6H when NPO; requirements based off insulin gtt    ID:  SIRS w/hypothermia, tachycardia, leukocytosis, lactate w/o obvious source of infection; hypothermia resolved, likely was iso hypoperfusion from STEMI  - RLE w/crusted lesions without associated erythema, warmth, or exudates  - BCx pending NGTD, started empirically on vancomycin/zosyn/doxy (6/20/21- 6/21/21)  - off abx, monitor T curve    Heme/Onc:   -DVT ppx: [facility] 85 year old male with hx of diabetes who comes in for AMS, found to be hypoxic and in DKA with additional oliguric renal failure, intubated and sedated 6/20 in the ED due to worsening mental status and inability to protect airway and now with STEMI on medical management    Neuro  #AMS, likely metabolic encephalopathy iso hypercapnia and metabolic acidosis iso renal failure  - off sedation; opens eyes to voice; patient following commands    Pulm   #hypercapnic respiratory failure likely iso metabolic encephalopathy and intubated 2/2 c/f airway protection  - Patient extubated and on RA doing well  - PRN Bipap if needed    Cardiac:   #STEMI in leads V1/V2 w/rising troponin and CKMB  -s/p ASA/plavix load; c/w ASA, Plavix; c/w hep gtt for 48h  -c/f ADHF given pleural effusions and BNP 50k though w/minimal O2 requirements on ventilator (PEEP 8/FiO2 30%); hold BB  -off bumex gtt since ~0800 on 6/22   -no plans for cardiac cath while pt intubated and not to baseline mental status, per cardiology    #L femoral-deep peroneal artery byspass (on prelim [facility] Duplex arterial)  - 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 Will follow-up official consult     GI  #LFT elevation, likely shock liver given acute rise in LFTs from WNL to 3000/1600 iso hypoperfusion from MI vs. less likely congestive hepatopathy  - LFTs trending down  - continue to monitor  - OG tube in place; currently on tube feeds    Renal:   #LINDSEY (no previous labs to assess baseline) c/b oliguric renal failure, likely ATN 2/2 hypoperfusion iso MI vs. hypovolemia prior to MI  - Cr uptrending likely iso ATN that is starting to plateau  - HAGMA 2/2 lactic acidosis  - UOP 2.34L off bumex for 48h; level of UOP iso post-ATN diuresis    #metabolic alkalosis, likely contraction alkalosis  - HCO3 32 today  - Will continue to monitor     Endo  #DKA, resolved  - AG present but w/o BHB or acidosis; AG likely iso renal failure  - on NPH 11 Q6H; or 5 Q6H when NPO; requirements based off insulin gtt    ID:  SIRS w/hypothermia, tachycardia, leukocytosis, lactate w/o obvious source of infection; hypothermia resolved, likely was iso hypoperfusion from STEMI  - RLE w/crusted lesions without associated erythema, warmth, or exudates  - BCx pending NGTD, started empirically on vancomycin/zosyn/doxy (6/20/21- 6/21/21)  - off abx, monitor T curve    Heme/Onc:   -DVT ppx: [facility]

## 2021-06-26 NOTE — CHART NOTE - NSCHARTNOTEFT_GEN_A_CORE
MICU DOWN GRADE NOTE    Patient is a 85y old  Male who presents with a chief complaint of AMS, Hypoxia (26 Jun 2021 08:14)      HPI:    INTERVAL HPI/OVERNIGHT EVENTS:        REVIEW OF SYSTEMS:  CONSTITUTIONAL: No fever, chills  HEENT:  No blurry vision No sinus or throat pain  NECK: No pain or stiffness  RESPIRATORY: No cough, wheezing, chills or hemoptysis; No shortness of breath  CARDIOVASCULAR: No chest pain, palpitations  GASTROINTESTINAL: No abdominal pain. No nausea, vomiting, or diarrhea  GENITOURINARY: No dysuria  NEUROLOGICAL: No HA, No focal weakness  SKIN: No itching, burning, rashes, or lesions   MUSCULOSKELETAL: No joint pain or swelling; No muscle, back, or extremity pain    MEDICATIONS:  aspirin  chewable 81 milliGRAM(s) Oral daily  carvedilol 3.125 milliGRAM(s) Oral every 12 hours  chlorhexidine 2% Cloths 1 Application(s) Topical daily  clopidogrel Tablet 75 milliGRAM(s) Oral daily  dextrose 40% Gel 15 Gram(s) Oral once  dextrose 5%. 1000 milliLiter(s) IV Continuous <Continuous>  dextrose 5%. 1000 milliLiter(s) IV Continuous <Continuous>  dextrose 50% Injectable 25 Gram(s) IV Push once  dextrose 50% Injectable 12.5 Gram(s) IV Push once  dextrose 50% Injectable 25 Gram(s) IV Push once  glucagon  Injectable 1 milliGRAM(s) IntraMuscular once  heparin   Injectable 4500 Unit(s) IV Push every 6 hours PRN  heparin   Injectable 2000 Unit(s) IV Push every 6 hours PRN  heparin  Infusion. 900 Unit(s)/Hr IV Continuous <Continuous>  insulin lispro (ADMELOG) corrective regimen sliding scale   SubCutaneous every 6 hours  insulin NPH human recombinant 11 Unit(s) SubCutaneous every 6 hours  norepinephrine Infusion 0.05 MICROgram(s)/kG/Min IV Continuous <Continuous>      T(C): 37.2 (06-26-21 @ 08:00), Max: 37.9 (06-26-21 @ 04:00)  HR: 72 (06-26-21 @ 15:15) (64 - 85)  BP: 160/70 (06-26-21 @ 13:00) (92/47 - 172/75)  RR: 19 (06-26-21 @ 13:00) (13 - 24)  SpO2: 100% (06-26-21 @ 15:15) (96% - 100%)  Wt(kg): --Vital Signs Last 24 Hrs  T(C): 37.2 (26 Jun 2021 08:00), Max: 37.9 (26 Jun 2021 04:00)  T(F): 99 (26 Jun 2021 08:00), Max: 100.2 (26 Jun 2021 04:00)  HR: 72 (26 Jun 2021 15:15) (64 - 85)  BP: 160/70 (26 Jun 2021 13:00) (92/47 - 172/75)  BP(mean): 93 (26 Jun 2021 13:00) (25 - 93)  RR: 19 (26 Jun 2021 13:00) (13 - 24)  SpO2: 100% (26 Jun 2021 15:15) (96% - 100%)    PHYSICAL EXAM:  GENERAL: NAD, well-groomed, well-developed  HEAD:  Atraumatic, Normocephalic  EYES: EOMI, PERRLA, conjunctiva and sclera clear  ENMT:  Moist mucous membranes  NECK: Supple, No JVD,  CHEST/LUNG: Clear to auscultation bilaterally; No rales, rhonchi, wheezing, or rubs  HEART: Regular rate and rhythm; No murmurs, rubs, or gallops  ABDOMEN: Soft, Nontender, Nondistended; Bowel sounds present  NEURO: Alert & Oriented X3  EXTREMITIES: No LE edema, no calf tenderness  LYMPH: No lymphadenopathy noted  SKIN: No rashes or lesions    Consultant(s) Notes Reviewed:  [x ] YES  [ ] NO  Care Discussed with Consultants/Other Providers [ x] YES  [ ] NO    LABS:                        11.5   10.70 )-----------( 155      ( 26 Jun 2021 00:54 )             36.2     06-26    143  |  95<L>  |  78<H>  ----------------------------<  133<H>  3.6   |  32<H>  |  2.91<H>    Ca    9.5      26 Jun 2021 00:54  Phos  5.2     06-26  Mg     2.1     06-26    TPro  6.5  /  Alb  2.8<L>  /  TBili  0.7  /  DBili  x   /  AST  49<H>  /  ALT  215<H>  /  AlkPhos  183<H>  06-26    PT/INR - ( 26 Jun 2021 00:54 )   PT: 15.3 sec;   INR: 1.36 ratio         PTT - ( 26 Jun 2021 07:49 )  PTT:88.3 sec    CAPILLARY BLOOD GLUCOSE      POCT Blood Glucose.: 191 mg/dL (26 Jun 2021 11:22)  POCT Blood Glucose.: 208 mg/dL (26 Jun 2021 05:20)  POCT Blood Glucose.: 138 mg/dL (25 Jun 2021 23:14)  POCT Blood Glucose.: 82 mg/dL (25 Jun 2021 17:05)      ABG - ( 26 Jun 2021 04:09 )  pH, Arterial: 7.52  pH, Blood: x     /  pCO2: 46    /  pO2: 87    / HCO3: 36    / Base Excess: 13.1  /  SaO2: 97.5        85 year old male with hx of diabetes who comes in for AMS, found to be hypoxic and in DKA with additional oliguric renal failure, intubated and sedated 6/20 in the ED due to worsening mental status and inability to protect airway and now with STEMI on medical management    Neuro  #AMS, likely metabolic encephalopathy iso hypercapnia and metabolic acidosis iso renal failure  - off sedation; opens eyes to voice; patient following commands    Pulm   #hypercapnic respiratory failure likely iso metabolic encephalopathy and intubated 2/2 c/f airway protection  - Patient extubated and on RA doing well  - PRN Bipap if needed    Cardiac:   #STEMI in leads V1/V2 w/rising troponin and CKMB  -s/p ASA/plavix load; c/w ASA, Plavix; c/w hep gtt for 48h  -c/f ADHF given pleural effusions and BNP 50k though w/minimal O2 requirements on ventilator (PEEP 8/FiO2 30%); hold BB  -off bumex gtt since ~0800 on 6/22   -no plans for cardiac cath while pt intubated and not to baseline mental status, per cardiology    #L femoral-deep peroneal artery byspass (on prelim [facility] Dup[facility] arterial)  - 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 Will follow-up official consult     GI  #LFT elevation, likely shock liver given acute rise in LFTs from WNL to 3000/1600 iso hypoperfusion from MI vs. less likely congestive hepatopathy  - LFTs trending down  - continue to monitor  - OG tube in place; currently on tube feeds    Renal:   #LINDSEY (no previous labs to assess baseline) c/b oliguric renal failure, likely ATN 2/2 hypoperfusion iso MI vs. hypovolemia prior to MI  - Cr uptrending likely iso ATN that is starting to plateau  - HAGMA 2/2 lactic acidosis  - UOP 2.34L off bumex for 48h; level of UOP iso post-ATN diuresis    #metabolic alkalosis, likely contraction alkalosis  - HCO3 32 today  - Will continue to monitor     Endo  #DKA, resolved  - AG present but w/o BHB or acidosis; AG likely iso renal failure  - on NPH 11 Q6H; or 5 Q6H when NPO; requirements based off insulin gtt    ID:  SIRS w/hypothermia, tachycardia, leukocytosis, lactate w/o obvious source of infection; hypothermia resolved, likely was iso hypoperfusion from STEMI  - RLE w/crusted lesions without associated erythema, warmth, or exudates  - BCx pending NGTD, started empirically on vancomycin/zosyn/doxy (6/20/21- 6/21/21)  - off abx, monitor T curve    Heme/Onc:   -heparin gtt      RADIOLOGY & ADDITIONAL TESTS:    Imaging Personally Reviewed:  [x ] YES  [ ] NO    For Follow Up  [ ]c/w heparin gtt for now. follow up vascular recs   [ ]when stabilized can send for angiogram of LE   [ ]f/u cardiology recommendations  [ ]f/u nephrology recs   [ ]c/w BiPAP prn (for work of breathing) MICU DOWN GRADE NOTE    Patient is a 85y old  Male who presents with a chief complaint of AMS, Hypoxia (26 Jun 2021 08:14)      HPI:  85 year old male pmh of diabetes presents to the hospital brought in by EMS today.  Per ED note and collateral info from son, patient not feeling well since yesterday and was given 0.4 nitro by son. Came to ED and reportedly hypoxic to 60s and lethargic. Mental status waxing and waning, patient was placed on bipap then subsequently intubated for airway protection in the ED. Patient found to be severely acidotic with worsening renal function from baseline, increase LFT and tachycardic and leukocytosis. Given dose of vanc/zosyn, bcx drawn, 2L IVF, potassium, bicarb, started on insulin gtt. Patient found to have STEMI in leads V1 and V2 w/ rising troponin and CKMP. Started on ASA, plavix and heparin gtt. Cardiology consulted and no plans for cardiac cath while patient intubated and not at baseline. Patient found to have stenosis on previous L femoral-deep peroneal artery bypass. Vascular consulted and patient started on heparin drip since not stable to get LE angiogram. Insulin and levo gtt and antibiotic discontinued. Patient stable enough to be extubated and but on BiPaP at night, currently on RA. Restarted tube feeds and NPH insulin.     INTERVAL HPI/OVERNIGHT EVENTS:  - Extubated yesterday  - Levo stopped overnight, patient on heparin gtt  - Patient was on BIPAP overnight but currently on RA and doing well  - Started tube feeds and insulin      REVIEW OF SYSTEMS:  CONSTITUTIONAL: No fever, chills  HEENT:  No blurry vision No sinus or throat pain  NECK: No pain or stiffness  RESPIRATORY: No cough, wheezing, chills or hemoptysis; No shortness of breath  CARDIOVASCULAR: No chest pain, palpitations  GASTROINTESTINAL: No abdominal pain. No nausea, vomiting, or diarrhea  GENITOURINARY: No dysuria  NEUROLOGICAL: No HA, No focal weakness  SKIN: No itching, burning, rashes, or lesions   MUSCULOSKELETAL: No joint pain or swelling; No muscle, back, or extremity pain    MEDICATIONS:  aspirin  chewable 81 milliGRAM(s) Oral daily  carvedilol 3.125 milliGRAM(s) Oral every 12 hours  chlorhexidine 2% Cloths 1 Application(s) Topical daily  clopidogrel Tablet 75 milliGRAM(s) Oral daily  dextrose 40% Gel 15 Gram(s) Oral once  dextrose 5%. 1000 milliLiter(s) IV Continuous <Continuous>  dextrose 5%. 1000 milliLiter(s) IV Continuous <Continuous>  dextrose 50% Injectable 25 Gram(s) IV Push once  dextrose 50% Injectable 12.5 Gram(s) IV Push once  dextrose 50% Injectable 25 Gram(s) IV Push once  glucagon  Injectable 1 milliGRAM(s) IntraMuscular once  heparin   Injectable 4500 Unit(s) IV Push every 6 hours PRN  heparin   Injectable 2000 Unit(s) IV Push every 6 hours PRN  heparin  Infusion. 900 Unit(s)/Hr IV Continuous <Continuous>  insulin lispro (ADMELOG) corrective regimen sliding scale   SubCutaneous every 6 hours  insulin NPH human recombinant 11 Unit(s) SubCutaneous every 6 hours  norepinephrine Infusion 0.05 MICROgram(s)/kG/Min IV Continuous <Continuous>      T(C): 37.2 (06-26-21 @ 08:00), Max: 37.9 (06-26-21 @ 04:00)  HR: 72 (06-26-21 @ 15:15) (64 - 85)  BP: 160/70 (06-26-21 @ 13:00) (92/47 - 172/75)  RR: 19 (06-26-21 @ 13:00) (13 - 24)  SpO2: 100% (06-26-21 @ 15:15) (96% - 100%)  Wt(kg): --Vital Signs Last 24 Hrs  T(C): 37.2 (26 Jun 2021 08:00), Max: 37.9 (26 Jun 2021 04:00)  T(F): 99 (26 Jun 2021 08:00), Max: 100.2 (26 Jun 2021 04:00)  HR: 72 (26 Jun 2021 15:15) (64 - 85)  BP: 160/70 (26 Jun 2021 13:00) (92/47 - 172/75)  BP(mean): 93 (26 Jun 2021 13:00) (25 - 93)  RR: 19 (26 Jun 2021 13:00) (13 - 24)  SpO2: 100% (26 Jun 2021 15:15) (96% - 100%)    PHYSICAL EXAM:  General: NAD  EYES: conjunctiva clear  Respiratory: On RA; Breath sounds are clear bilaterally, No wheezing, rales or rhonchi  Cardiovascular: S1 and S2, regular rate and rhythm, no Murmurs, gallops or rubs, no JVD,    Gastrointestinal: Bowel Sounds present, soft, nontender, nondistended, no guarding, no rebound  Extremities: No cyanosis or clubbing; warm to touch  Vascular: 2+ peripheral pulses lower ex  Neurological: Alert and Awake, follows commands; Pupils are equally reactive to light and symmetrical in size  Skin: RLE w/crusting; no exudates, drainage, or erythema    Consultant(s) Notes Reviewed:  [x ] YES  [ ] NO  Care Discussed with Consultants/Other Providers [ x] YES  [ ] NO    LABS:                        11.5   10.70 )-----------( 155      ( 26 Jun 2021 00:54 )             36.2     06-26    143  |  95<L>  |  78<H>  ----------------------------<  133<H>  3.6   |  32<H>  |  2.91<H>    Ca    9.5      26 Jun 2021 00:54  Phos  5.2     06-26  Mg     2.1     06-26    TPro  6.5  /  Alb  2.8<L>  /  TBili  0.7  /  DBili  x   /  AST  49<H>  /  ALT  215<H>  /  AlkPhos  183<H>  06-26    PT/INR - ( 26 Jun 2021 00:54 )   PT: 15.3 sec;   INR: 1.36 ratio         PTT - ( 26 Jun 2021 07:49 )  PTT:88.3 sec    CAPILLARY BLOOD GLUCOSE      POCT Blood Glucose.: 191 mg/dL (26 Jun 2021 11:22)  POCT Blood Glucose.: 208 mg/dL (26 Jun 2021 05:20)  POCT Blood Glucose.: 138 mg/dL (25 Jun 2021 23:14)  POCT Blood Glucose.: 82 mg/dL (25 Jun 2021 17:05)      ABG - ( 26 Jun 2021 04:09 )  pH, Arterial: 7.52  pH, Blood: x     /  pCO2: 46    /  pO2: 87    / HCO3: 36    / Base Excess: 13.1  /  SaO2: 97.5      Assessment:   85 year old male with hx of diabetes who comes in for AMS, found to be hypoxic and in DKA with additional oliguric renal failure, intubated and sedated 6/20 in the ED due to worsening mental status and inability to protect airway and now with STEMI on medical management.    Plan:  Neuro  #AMS, likely metabolic encephalopathy iso hypercapnia and metabolic acidosis iso renal failure  - off sedation; opens eyes to voice; patient following commands    Pulm   #hypercapnic respiratory failure likely iso metabolic encephalopathy and intubated 2/2 c/f airway protection  - Patient extubated and on RA doing well  - PRN Bipap if needed    Cardiac:   #STEMI in leads V1/V2 w/rising troponin and CKMB  -s/p ASA/plavix load; c/w ASA, Plavix; c/w hep gtt for 48h  -c/f ADHF given pleural effusions and BNP 50k though w/minimal O2 requirements on ventilator (PEEP 8/FiO2 30%); hold BB  -off bumex gtt since ~0800 on 6/22   -no plans for cardiac cath while pt intubated and not to baseline mental status, per cardiology    #L femoral-deep peroneal artery byspass (on prelim [facility] Dup[facility] arterial)  - 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 Will follow-up official consult     GI  #LFT elevation, likely shock liver given acute rise in LFTs from WNL to 3000/1600 iso hypoperfusion from MI vs. less likely congestive hepatopathy  - LFTs trending down  - continue to monitor  - NG tube in place; currently on tube feeds    Renal:   #LINDESY (no previous labs to assess baseline) c/b oliguric renal failure, likely ATN 2/2 hypoperfusion iso MI vs. hypovolemia prior to MI  - Cr uptrending likely iso ATN that is starting to plateau  - HAGMA 2/2 lactic acidosis  - UOP 2.34L off bumex for 48h; level of UOP iso post-ATN diuresis  -Appreciate Nephro recs     #metabolic alkalosis, likely contraction alkalosis  - HCO3 32 today  - Will continue to monitor     Endo  #DKA, resolved  - AG present but w/o BHB or acidosis; AG likely iso renal failure  - on NPH 11 Q6H; or 5 Q6H when NPO; requirements based off insulin gtt    ID:  SIRS w/hypothermia, tachycardia, leukocytosis, lactate w/o obvious source of infection; hypothermia resolved, likely was iso hypoperfusion from STEMI  - RLE w/crusted lesions without associated erythema, warmth, or exudates  - BCx pending NGTD, started empirically on vancomycin/zosyn/doxy (6/20/21- 6/21/21)  - off abx, monitor T curve    Heme/Onc:   -heparin gtt      RADIOLOGY & ADDITIONAL TESTS:    Imaging Personally Reviewed:  [x ] YES  [ ] NO    For Follow Up  [ ]c/w heparin gtt for now. follow up vascular recs   [ ]when stabilized can send for angiogram of LE   [ ]f/u cardiology recommendations  [ ]f/u nephrology recs   [ ]c/w BiPAP prn (for work of breathing)

## 2021-06-26 NOTE — CHART NOTE - NSCHARTNOTEFT_GEN_A_CORE
MAR Accept Note  Transfer to:  [id number]  Accepting Attending Physician:  BRYANNA  Assigned Room:      Patient seen and examined.   Labs and data reviewed.   No findings precluding transfer of service.       HPI/MICU COURSE:   Please refer to MICU transfer note for full details. Briefly, this is a  85 year old male pmh of diabetes presents to the hospital brought in by EMS today.  Per ED note and collateral info from son, patient not feeling well since yesterday and was given 0.4 nitro by son. Came to ED and reportedly hypoxic to 60s and lethargic. Mental status waxing and waning, patient was placed on bipap then subsequently intubated for airway protection in the ED. Patient found to be severely acidotic with worsening renal function from baseline, increase LFT and tachycardic and leukocytosis. Given dose of vanc/zosyn, bcx drawn, 2L IVF, potassium, bicarb, started on insulin gtt. Patient found to have STEMI in leads V1 and V2 w/ rising troponin and CKMP. Started on ASA, plavix and heparin gtt. Cardiology consulted and no plans for cardiac cath while patient intubated and not at baseline. Patient found to have stenosis on previous L femoral-deep peroneal artery bypass. Vascular consulted and patient started on heparin drip since not stable to get LE angiogram. Insulin and levo gtt and antibiotic discontinued. Patient stable enough to be extubated and but on BiPaP at night, currently on RA. Restarted tube feeds and NPH insulin.     INTERVAL HPI/OVERNIGHT EVENTS:  - Extubated yesterday  - Levo stopped overnight, patient on heparin gtt  - Patient was on BIPAP overnight but currently on RA and doing well  - Started tube feeds and insulin    seen and assessed with family at bedside, not in apparent distress, resting comfortably, no pain, would like to go home    Vital Signs (24 Hrs):  T(C): 36.6 (21 @ 12:00), Max: 37.9 (21 @ 04:00)  HR: 70 (21 @ 16:00) (64 - 85)  BP: 129/63 (21 @ 16:00) (92/47 - 168/72)  RR: 19 (21 @ 16:00) (13 - 24)  SpO2: 99% (21 @ 16:00) (96% - 100%)  Wt(kg): --  Daily     Daily Weight in k.4 (2021 04:00)    I&O's Summary    2021 07:01  -  2021 07:00  --------------------------------------------------------  IN: 1633.1 mL / OUT: 1975 mL / NET: -341.9 mL    2021 07:01  -  2021 16:55  --------------------------------------------------------  IN: 312 mL / OUT: 1100 mL / NET: -788 mL    PHYSICAL EXAM  GENERAL: NAD, lying comfortably in bed   HEAD:  Atraumatic, Normocephalic  EYES: EOMI b/l, PERRLA b/l, conjunctiva and sclera clear  NECK: Supple, No JVD, No LAD   CHEST/LUNG: Clear to auscultation bilaterally; No wheeze or ronchi  HEART: Regular rate and rhythm; S1 and S2 present, No murmurs, rubs, or gallops  ABDOMEN: Soft, Nontender, Nondistended; Bowel sounds present  EXTREMITIES:  , No clubbing, cyanosis, or edema  NEURO: AAOx3, non-focal   SKIN: No rashes or lesions    MEDICATIONS  (STANDING):  aspirin  chewable 81 milliGRAM(s) Oral daily  carvedilol 3.125 milliGRAM(s) Oral every 12 hours  chlorhexidine 2% Cloths 1 Application(s) Topical daily  clopidogrel Tablet 75 milliGRAM(s) Oral daily  dextrose 40% Gel 15 Gram(s) Oral once  dextrose 5%. 1000 milliLiter(s) (50 mL/Hr) IV Continuous <Continuous>  dextrose 5%. 1000 milliLiter(s) (100 mL/Hr) IV Continuous <Continuous>  dextrose 50% Injectable 25 Gram(s) IV Push once  dextrose 50% Injectable 12.5 Gram(s) IV Push once  dextrose 50% Injectable 25 Gram(s) IV Push once  glucagon  Injectable 1 milliGRAM(s) IntraMuscular once  heparin  Infusion. 900 Unit(s)/Hr (9 mL/Hr) IV Continuous <Continuous>  insulin lispro (ADMELOG) corrective regimen sliding scale   SubCutaneous every 6 hours  insulin NPH human recombinant 11 Unit(s) SubCutaneous every 6 hours    MEDICATIONS  (PRN):  heparin   Injectable 4500 Unit(s) IV Push every 6 hours PRN For aPTT less than 40  heparin   Injectable 2000 Unit(s) IV Push every 6 hours PRN For aPTT between 40 - 57    .  LABS:                         11.5   10.70 )-----------( 155      ( 2021 00:54 )             36.2     -    143  |  95<L>  |  78<H>  ----------------------------<  133<H>  3.6   |  32<H>  |  2.91<H>    Ca    9.5      2021 00:54  Phos  5.2       Mg     2.1         TPro  6.5  /  Alb  2.8<L>  /  TBili  0.7  /  DBili  x   /  AST  49<H>  /  ALT  215<H>  /  AlkPhos  183<H>      PT/INR - ( 2021 00:54 )   PT: 15.3 sec;   INR: 1.36 ratio         PTT - ( 2021 07:49 )  PTT:88.3 sec          RADIOLOGY, EKG & ADDITIONAL TESTS: Reviewed.     Assessment:   85 year old male with hx of diabetes who comes in for AMS, found to be hypoxic and in DKA with additional oliguric renal failure, intubated and sedated  in the ED due to worsening mental status and inability to protect airway and now with STEMI on medical management.    Plan:  Neuro  #AMS, likely metabolic encephalopathy iso hypercapnia and metabolic acidosis iso renal failure  - off sedation; opens eyes to voice; patient following commands    Pulm   #hypercapnic respiratory failure likely iso metabolic encephalopathy and intubated 2/2 c/f airway protection  - Patient extubated and on RA doing well  - PRN Bipap if needed    Cardiac:   #STEMI in leads V1/V2 w/rising troponin and CKMB  -s/p ASA/plavix load; c/w ASA, Plavix; c/w hep gtt for 48h  -c/f ADHF given pleural effusions and BNP 50k though w/minimal O2 requirements on ventilator (PEEP 8/FiO2 30%); hold BB  -off bumex gtt since ~0800 on    -no plans for cardiac cath while pt intubated and not to baseline mental status, per cardiology    #L femoral-deep peroneal artery byspass (on prelim [facility] Duplex arterial)  - 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 Will follow-up official consult     GI  #LFT elevation, likely shock liver given acute rise in LFTs from WNL to 3000/1600 iso hypoperfusion from MI vs. less likely congestive hepatopathy  - LFTs trending down  - continue to monitor  - NG tube in place; currently on tube feeds    Renal:   #LINDSEY (no previous labs to assess baseline) c/b oliguric renal failure, likely ATN 2/2 hypoperfusion iso MI vs. hypovolemia prior to MI  - Cr uptrending likely iso ATN that is starting to plateau  - HAGMA 2/2 lactic acidosis  - UOP 2.34L off bumex for 48h; level of UOP iso post-ATN diuresis  -Appreciate Nephro recs     #metabolic alkalosis, likely contraction alkalosis  - HCO3 32 today  - Will continue to monitor     Endo  #DKA, resolved  - AG present but w/o BHB or acidosis; AG likely iso renal failure  - on NPH 11 Q6H; or 5 Q6H when NPO; requirements based off insulin gtt    ID:  SIRS w/hypothermia, tachycardia, leukocytosis, lactate w/o obvious source of infection; hypothermia resolved, likely was iso hypoperfusion from STEMI  - RLE w/crusted lesions without associated erythema, warmth, or exudates  - BCx pending NGTD, started empirically on vancomycin/zosyn/doxy (21- 21)  - off abx, monitor T curve    Heme/Onc:   -heparin gtt        FOR FOLLOW-UP:  [ ]c/w heparin gtt for now. follow up vascular recs   [ ]when stabilized can send for angiogram of LE   [ ]f/u cardiology recommendations  [ ]f/u nephrology recs   [ ]c/w BiPAP prn (for work of breathing).  [] speech and swallow mai Mathew MD  Internal Medicine PGY-3  MAR [id number] MAR Accept Note  Transfer to:  [facility] [place], [place]  Accepting Attending Physician:  BRYANNA  Assigned Room:      Patient seen and examined.   Labs and data reviewed.   No findings precluding transfer of service.       HPI/MICU COURSE:   Please refer to MICU transfer note for full details. Briefly, this is a  85 year old male pmh of diabetes presents to the hospital brought in by EMS today.  Per ED note and collateral info from son, patient not feeling well since yesterday and was given 0.4 nitro by son. Came to ED and reportedly hypoxic to 60s and lethargic. Mental status waxing and waning, patient was placed on bipap then subsequently intubated for airway protection in the ED. Patient found to be severely acidotic with worsening renal function from baseline, increase LFT and tachycardic and leukocytosis. Given dose of vanc/zosyn, bcx drawn, 2L IVF, potassium, bicarb, started on insulin gtt. Patient found to have STEMI in leads V1 and V2 w/ rising troponin and CKMP. Started on ASA, plavix and heparin gtt. Cardiology consulted and no plans for cardiac cath while patient intubated and not at baseline. Patient found to have stenosis on previous L femoral-deep peroneal artery bypass. Vascular consulted and patient started on heparin drip since not stable to get LE angiogram. Insulin and levo gtt and antibiotic discontinued. Patient stable enough to be extubated and but on BiPaP at night, currently on RA. Restarted tube feeds and NPH insulin.     INTERVAL HPI/OVERNIGHT EVENTS:  - Extubated yesterday  - Levo stopped overnight, patient on heparin gtt  - Patient was on BIPAP overnight but currently on RA and doing well  - Started tube feeds and insulin    seen and assessed with family at bedside, not in apparent distress, resting comfortably, no pain, would like to go home    Vital Signs (24 Hrs):  T(C): 36.6 (21 @ 12:00), Max: 37.9 (21 @ 04:00)  HR: 70 (21 @ 16:00) (64 - 85)  BP: 129/63 (21 @ 16:00) (92/47 - 168/72)  RR: 19 (21 @ 16:00) (13 - 24)  SpO2: 99% (21 @ 16:00) (96% - 100%)  Wt(kg): --  Daily     Daily Weight in k.4 (2021 04:00)    I&O's Summary    2021 07:01  -  2021 07:00  --------------------------------------------------------  IN: 1633.1 mL / OUT: 1975 mL / NET: -341.9 mL    2021 07:01  -  2021 16:55  --------------------------------------------------------  IN: 312 mL / OUT: 1100 mL / NET: -788 mL    PHYSICAL EXAM  GENERAL: NAD, lying comfortably in bed   HEAD:  Atraumatic, Normocephalic  EYES: EOMI b/l, PERRLA b/l, conjunctiva and sclera clear  NECK: Supple, No JVD, No LAD   CHEST/LUNG: Clear to auscultation bilaterally; No wheeze or ronchi  HEART: Regular rate and rhythm; S1 and S2 present, No murmurs, rubs, or gallops  ABDOMEN: Soft, Nontender, Nondistended; Bowel sounds present  EXTREMITIES:  , No clubbing, cyanosis, or edema  NEURO: AAOx3, non-focal   SKIN: No rashes or lesions    MEDICATIONS  (STANDING):  aspirin  chewable 81 milliGRAM(s) Oral daily  carvedilol 3.125 milliGRAM(s) Oral every 12 hours  chlorhexidine 2% Cloths 1 Application(s) Topical daily  clopidogrel Tablet 75 milliGRAM(s) Oral daily  dextrose 40% Gel 15 Gram(s) Oral once  dextrose 5%. 1000 milliLiter(s) (50 mL/Hr) IV Continuous <Continuous>  dextrose 5%. 1000 milliLiter(s) (100 mL/Hr) IV Continuous <Continuous>  dextrose 50% Injectable 25 Gram(s) IV Push once  dextrose 50% Injectable 12.5 Gram(s) IV Push once  dextrose 50% Injectable 25 Gram(s) IV Push once  glucagon  Injectable 1 milliGRAM(s) IntraMuscular once  heparin  Infusion. 900 Unit(s)/Hr (9 mL/Hr) IV Continuous <Continuous>  insulin lispro (ADMELOG) corrective regimen sliding scale   SubCutaneous every 6 hours  insulin NPH human recombinant 11 Unit(s) SubCutaneous every 6 hours    MEDICATIONS  (PRN):  heparin   Injectable 4500 Unit(s) IV Push every 6 hours PRN For aPTT less than 40  heparin   Injectable 2000 Unit(s) IV Push every 6 hours PRN For aPTT between 40 - 57    .  LABS:                         11.5   10.70 )-----------( 155      ( 2021 00:54 )             36.2     -    143  |  95<L>  |  78<H>  ----------------------------<  133<H>  3.6   |  32<H>  |  2.91<H>    Ca    9.5      2021 00:54  Phos  5.2       Mg     2.1         TPro  6.5  /  Alb  2.8<L>  /  TBili  0.7  /  DBili  x   /  AST  49<H>  /  ALT  215<H>  /  AlkPhos  183<H>      PT/INR - ( 2021 00:54 )   PT: 15.3 sec;   INR: 1.36 ratio         PTT - ( 2021 07:49 )  PTT:88.3 sec          RADIOLOGY, EKG & ADDITIONAL TESTS: Reviewed.     Assessment:   85 year old male with hx of diabetes who comes in for AMS, found to be hypoxic and in DKA with additional oliguric renal failure, intubated and sedated  in the ED due to worsening mental status and inability to protect airway and now with STEMI on medical management.    Plan:  Neuro  #AMS, likely metabolic encephalopathy iso hypercapnia and metabolic acidosis iso renal failure  - off sedation; opens eyes to voice; patient following commands    Pulm   #hypercapnic respiratory failure likely iso metabolic encephalopathy and intubated 2/2 c/f airway protection  - Patient extubated and on RA doing well  - PRN Bipap if needed    Cardiac:   #STEMI in leads V1/V2 w/rising troponin and CKMB  -s/p ASA/plavix load; c/w ASA, Plavix; c/w hep gtt for 48h  -c/f ADHF given pleural effusions and BNP 50k though w/minimal O2 requirements on ventilator (PEEP 8/FiO2 30%); hold BB  -off bumex gtt since ~0800 on    -no plans for cardiac cath while pt intubated and not to baseline mental status, per cardiology    #L femoral-deep peroneal artery byspass (on prelim [facility] Duplex arterial)  - 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 Will follow-up official consult     GI  #LFT elevation, likely shock liver given acute rise in LFTs from WNL to 3000/1600 iso hypoperfusion from MI vs. less likely congestive hepatopathy  - LFTs trending down  - continue to monitor  - NG tube in place; currently on tube feeds    Renal:   #LINDSEY (no previous labs to assess baseline) c/b oliguric renal failure, likely ATN 2/2 hypoperfusion iso MI vs. hypovolemia prior to MI  - Cr uptrending likely iso ATN that is starting to plateau  - HAGMA 2/2 lactic acidosis  - UOP 2.34L off bumex for 48h; level of UOP iso post-ATN diuresis  -Appreciate Nephro recs     #metabolic alkalosis, likely contraction alkalosis  - HCO3 32 today  - Will continue to monitor     Endo  #DKA, resolved  - AG present but w/o BHB or acidosis; AG likely iso renal failure  - on NPH 11 Q6H; or 5 Q6H when NPO; requirements based off insulin gtt    ID:  SIRS w/hypothermia, tachycardia, leukocytosis, lactate w/o obvious source of infection; hypothermia resolved, likely was iso hypoperfusion from STEMI  - RLE w/crusted lesions without associated erythema, warmth, or exudates  - BCx pending NGTD, started empirically on vancomycin/zosyn/doxy (21- 21)  - off abx, monitor T curve    Heme/Onc:   -heparin gtt        FOR FOLLOW-UP:  [ ]c/w heparin gtt for now. follow up vascular recs   [ ]when stabilized can send for angiogram of LE   [ ]f/u cardiology recommendations  [ ]f/u nephrology recs   [ ]c/w BiPAP prn (for work of breathing).  [] speech and swallow mai Mathew MD  Internal Medicine PGY-3  MAR [id number]

## 2021-06-26 NOTE — CHART NOTE - NSCHARTNOTEFT_GEN_A_CORE
Critically ill patient requiring ABG to determine respiratory status.  This was an emergent procedure.  Patients radial artery was visualized with US and cleaned with alcohol pad.   23g x 3/4" x 12" butterfly needed was inserted into the right radial artery with pulsatile flash.  One attempt was performed 3cc of blood was obtained from patient.  Gauze pad was placed over site, needle withdrawn and firm pressure was held until complete hemostasis was achieved and band-aid was applied.  Patient tolerated procedure well with no complications.        Hanna Puente PA-C  [facility] [id number]

## 2021-06-27 DIAGNOSIS — E87.6 HYPOKALEMIA: ICD-10-CM

## 2021-06-27 DIAGNOSIS — I10 ESSENTIAL (PRIMARY) HYPERTENSION: ICD-10-CM

## 2021-06-27 DIAGNOSIS — E46 UNSPECIFIED PROTEIN-CALORIE MALNUTRITION: ICD-10-CM

## 2021-06-27 DIAGNOSIS — E11.649 TYPE 2 DIABETES MELLITUS WITH HYPOGLYCEMIA WITHOUT COMA: ICD-10-CM

## 2021-06-27 LAB
ANION GAP SERPL CALC-SCNC: 12 MMOL/L — SIGNIFICANT CHANGE UP (ref 7–14)
ANION GAP SERPL CALC-SCNC: 16 MMOL/L — HIGH (ref 7–14)
APTT BLD: 68.3 SEC — HIGH (ref 27–36.3)
BASOPHILS # BLD AUTO: 0.02 K/UL — SIGNIFICANT CHANGE UP (ref 0–0.2)
BASOPHILS NFR BLD AUTO: 0.3 % — SIGNIFICANT CHANGE UP (ref 0–2)
BLOOD GAS ARTERIAL COMPREHENSIVE RESULT: SIGNIFICANT CHANGE UP
BUN SERPL-MCNC: 90 MG/DL — HIGH (ref 7–23)
BUN SERPL-MCNC: 90 MG/DL — HIGH (ref 7–23)
CALCIUM SERPL-MCNC: 10.1 MG/DL — SIGNIFICANT CHANGE UP (ref 8.4–10.5)
CALCIUM SERPL-MCNC: 9.4 MG/DL — SIGNIFICANT CHANGE UP (ref 8.4–10.5)
CHLORIDE SERPL-SCNC: 95 MMOL/L — LOW (ref 98–107)
CHLORIDE SERPL-SCNC: 97 MMOL/L — LOW (ref 98–107)
CO2 SERPL-SCNC: 34 MMOL/L — HIGH (ref 22–31)
CO2 SERPL-SCNC: 38 MMOL/L — HIGH (ref 22–31)
CREAT SERPL-MCNC: 2.37 MG/DL — HIGH (ref 0.5–1.3)
CREAT SERPL-MCNC: 2.46 MG/DL — HIGH (ref 0.5–1.3)
EOSINOPHIL # BLD AUTO: 0.16 K/UL — SIGNIFICANT CHANGE UP (ref 0–0.5)
EOSINOPHIL NFR BLD AUTO: 2 % — SIGNIFICANT CHANGE UP (ref 0–6)
GLUCOSE BLDC GLUCOMTR-MCNC: 122 MG/DL — HIGH (ref 70–99)
GLUCOSE BLDC GLUCOMTR-MCNC: 166 MG/DL — HIGH (ref 70–99)
GLUCOSE BLDC GLUCOMTR-MCNC: 168 MG/DL — HIGH (ref 70–99)
GLUCOSE BLDC GLUCOMTR-MCNC: 180 MG/DL — HIGH (ref 70–99)
GLUCOSE BLDC GLUCOMTR-MCNC: 197 MG/DL — HIGH (ref 70–99)
GLUCOSE BLDC GLUCOMTR-MCNC: 200 MG/DL — HIGH (ref 70–99)
GLUCOSE BLDC GLUCOMTR-MCNC: 214 MG/DL — HIGH (ref 70–99)
GLUCOSE BLDC GLUCOMTR-MCNC: 239 MG/DL — HIGH (ref 70–99)
GLUCOSE BLDC GLUCOMTR-MCNC: 271 MG/DL — HIGH (ref 70–99)
GLUCOSE BLDC GLUCOMTR-MCNC: 46 MG/DL — CRITICAL LOW (ref 70–99)
GLUCOSE BLDC GLUCOMTR-MCNC: 47 MG/DL — CRITICAL LOW (ref 70–99)
GLUCOSE BLDC GLUCOMTR-MCNC: 63 MG/DL — LOW (ref 70–99)
GLUCOSE BLDC GLUCOMTR-MCNC: 69 MG/DL — LOW (ref 70–99)
GLUCOSE SERPL-MCNC: 234 MG/DL — HIGH (ref 70–99)
GLUCOSE SERPL-MCNC: 312 MG/DL — HIGH (ref 70–99)
HCT VFR BLD CALC: 32.1 % — LOW (ref 39–50)
HGB BLD-MCNC: 10.2 G/DL — LOW (ref 13–17)
IANC: 5.9 K/UL — SIGNIFICANT CHANGE UP (ref 1.5–8.5)
IMM GRANULOCYTES NFR BLD AUTO: 0.9 % — SIGNIFICANT CHANGE UP (ref 0–1.5)
LYMPHOCYTES # BLD AUTO: 0.69 K/UL — LOW (ref 1–3.3)
LYMPHOCYTES # BLD AUTO: 8.6 % — LOW (ref 13–44)
MAGNESIUM SERPL-MCNC: 2.4 MG/DL — SIGNIFICANT CHANGE UP (ref 1.6–2.6)
MCHC RBC-ENTMCNC: 29.2 PG — SIGNIFICANT CHANGE UP (ref 27–34)
MCHC RBC-ENTMCNC: 31.8 GM/DL — LOW (ref 32–36)
MCV RBC AUTO: 92 FL — SIGNIFICANT CHANGE UP (ref 80–100)
MONOCYTES # BLD AUTO: 1.14 K/UL — HIGH (ref 0–0.9)
MONOCYTES NFR BLD AUTO: 14.3 % — HIGH (ref 2–14)
NEUTROPHILS # BLD AUTO: 5.9 K/UL — SIGNIFICANT CHANGE UP (ref 1.8–7.4)
NEUTROPHILS NFR BLD AUTO: 73.9 % — SIGNIFICANT CHANGE UP (ref 43–77)
NRBC # BLD: 0 /100 WBCS — SIGNIFICANT CHANGE UP
NRBC # FLD: 0 K/UL — SIGNIFICANT CHANGE UP
PHOSPHATE SERPL-MCNC: 3.7 MG/DL — SIGNIFICANT CHANGE UP (ref 2.5–4.5)
PLATELET # BLD AUTO: 149 K/UL — LOW (ref 150–400)
POTASSIUM SERPL-MCNC: 2.6 MMOL/L — CRITICAL LOW (ref 3.5–5.3)
POTASSIUM SERPL-MCNC: 3.8 MMOL/L — SIGNIFICANT CHANGE UP (ref 3.5–5.3)
POTASSIUM SERPL-SCNC: 2.6 MMOL/L — CRITICAL LOW (ref 3.5–5.3)
POTASSIUM SERPL-SCNC: 3.8 MMOL/L — SIGNIFICANT CHANGE UP (ref 3.5–5.3)
RBC # BLD: 3.49 M/UL — LOW (ref 4.2–5.8)
RBC # FLD: 14.2 % — SIGNIFICANT CHANGE UP (ref 10.3–14.5)
SODIUM SERPL-SCNC: 145 MMOL/L — SIGNIFICANT CHANGE UP (ref 135–145)
SODIUM SERPL-SCNC: 147 MMOL/L — HIGH (ref 135–145)
WBC # BLD: 7.98 K/UL — SIGNIFICANT CHANGE UP (ref 3.8–10.5)
WBC # FLD AUTO: 7.98 K/UL — SIGNIFICANT CHANGE UP (ref 3.8–10.5)

## 2021-06-27 PROCEDURE — 99221 1ST HOSP IP/OBS SF/LOW 40: CPT

## 2021-06-27 PROCEDURE — 71045 X-RAY EXAM CHEST 1 VIEW: CPT | Mod: 26

## 2021-06-27 PROCEDURE — 70450 CT HEAD/BRAIN W/O DYE: CPT | Mod: 26

## 2021-06-27 PROCEDURE — 99233 SBSQ HOSP IP/OBS HIGH 50: CPT | Mod: GC

## 2021-06-27 PROCEDURE — 99233 SBSQ HOSP IP/OBS HIGH 50: CPT

## 2021-06-27 PROCEDURE — 99222 1ST HOSP IP/OBS MODERATE 55: CPT | Mod: GC

## 2021-06-27 PROCEDURE — 93010 ELECTROCARDIOGRAM REPORT: CPT

## 2021-06-27 RX ORDER — HUMAN INSULIN 100 [IU]/ML
10 INJECTION, SUSPENSION SUBCUTANEOUS EVERY 6 HOURS
Refills: 0 | Status: DISCONTINUED | OUTPATIENT
Start: 2021-06-27 | End: 2021-06-28

## 2021-06-27 RX ORDER — METOPROLOL TARTRATE 50 MG
12.5 TABLET ORAL THREE TIMES A DAY
Refills: 0 | Status: DISCONTINUED | OUTPATIENT
Start: 2021-06-27 | End: 2021-06-28

## 2021-06-27 RX ORDER — DILTIAZEM HCL 120 MG
10 CAPSULE, EXT RELEASE 24 HR ORAL ONCE
Refills: 0 | Status: DISCONTINUED | OUTPATIENT
Start: 2021-06-27 | End: 2021-06-27

## 2021-06-27 RX ORDER — METOPROLOL TARTRATE 50 MG
10 TABLET ORAL ONCE
Refills: 0 | Status: DISCONTINUED | OUTPATIENT
Start: 2021-06-27 | End: 2021-06-27

## 2021-06-27 RX ORDER — AMIODARONE HYDROCHLORIDE 400 MG/1
150 TABLET ORAL ONCE
Refills: 0 | Status: COMPLETED | OUTPATIENT
Start: 2021-06-27 | End: 2021-06-27

## 2021-06-27 RX ORDER — POTASSIUM CHLORIDE 20 MEQ
40 PACKET (EA) ORAL ONCE
Refills: 0 | Status: COMPLETED | OUTPATIENT
Start: 2021-06-27 | End: 2021-06-27

## 2021-06-27 RX ORDER — METOPROLOL TARTRATE 50 MG
5 TABLET ORAL ONCE
Refills: 0 | Status: COMPLETED | OUTPATIENT
Start: 2021-06-27 | End: 2021-06-27

## 2021-06-27 RX ORDER — SODIUM CHLORIDE 9 MG/ML
250 INJECTION INTRAMUSCULAR; INTRAVENOUS; SUBCUTANEOUS ONCE
Refills: 0 | Status: COMPLETED | OUTPATIENT
Start: 2021-06-27 | End: 2021-06-27

## 2021-06-27 RX ORDER — DEXTROSE 50 % IN WATER 50 %
25 SYRINGE (ML) INTRAVENOUS ONCE
Refills: 0 | Status: COMPLETED | OUTPATIENT
Start: 2021-06-27 | End: 2021-06-27

## 2021-06-27 RX ORDER — POTASSIUM CHLORIDE 20 MEQ
10 PACKET (EA) ORAL
Refills: 0 | Status: COMPLETED | OUTPATIENT
Start: 2021-06-27 | End: 2021-06-27

## 2021-06-27 RX ORDER — SODIUM CHLORIDE 9 MG/ML
1000 INJECTION INTRAMUSCULAR; INTRAVENOUS; SUBCUTANEOUS
Refills: 0 | Status: COMPLETED | OUTPATIENT
Start: 2021-06-27 | End: 2021-06-27

## 2021-06-27 RX ADMIN — AMIODARONE HYDROCHLORIDE 618 MILLIGRAM(S): 400 TABLET ORAL at 17:01

## 2021-06-27 RX ADMIN — Medication 40 MILLIEQUIVALENT(S): at 07:44

## 2021-06-27 RX ADMIN — HUMAN INSULIN 11 UNIT(S): 100 INJECTION, SUSPENSION SUBCUTANEOUS at 13:06

## 2021-06-27 RX ADMIN — HEPARIN SODIUM 900 UNIT(S)/HR: 5000 INJECTION INTRAVENOUS; SUBCUTANEOUS at 16:55

## 2021-06-27 RX ADMIN — SODIUM CHLORIDE 1500 MILLILITER(S): 9 INJECTION INTRAMUSCULAR; INTRAVENOUS; SUBCUTANEOUS at 15:41

## 2021-06-27 RX ADMIN — Medication 2: at 18:18

## 2021-06-27 RX ADMIN — Medication 100 MILLIEQUIVALENT(S): at 08:57

## 2021-06-27 RX ADMIN — Medication 2: at 13:06

## 2021-06-27 RX ADMIN — Medication 81 MILLIGRAM(S): at 12:47

## 2021-06-27 RX ADMIN — HUMAN INSULIN 10 UNIT(S): 100 INJECTION, SUSPENSION SUBCUTANEOUS at 23:58

## 2021-06-27 RX ADMIN — HUMAN INSULIN 11 UNIT(S): 100 INJECTION, SUSPENSION SUBCUTANEOUS at 18:19

## 2021-06-27 RX ADMIN — Medication 5 MILLIGRAM(S): at 14:15

## 2021-06-27 RX ADMIN — Medication 1: at 23:59

## 2021-06-27 RX ADMIN — CHLORHEXIDINE GLUCONATE 1 APPLICATION(S): 213 SOLUTION TOPICAL at 12:47

## 2021-06-27 RX ADMIN — CARVEDILOL PHOSPHATE 3.12 MILLIGRAM(S): 80 CAPSULE, EXTENDED RELEASE ORAL at 06:57

## 2021-06-27 RX ADMIN — Medication 25 GRAM(S): at 01:42

## 2021-06-27 RX ADMIN — SODIUM CHLORIDE 75 MILLILITER(S): 9 INJECTION INTRAMUSCULAR; INTRAVENOUS; SUBCUTANEOUS at 12:47

## 2021-06-27 RX ADMIN — Medication 100 MILLIEQUIVALENT(S): at 07:44

## 2021-06-27 RX ADMIN — CLOPIDOGREL BISULFATE 75 MILLIGRAM(S): 75 TABLET, FILM COATED ORAL at 12:47

## 2021-06-27 RX ADMIN — Medication 5 MILLIGRAM(S): at 13:45

## 2021-06-27 RX ADMIN — Medication 100 MILLIEQUIVALENT(S): at 11:04

## 2021-06-27 RX ADMIN — Medication 12.5 MILLIGRAM(S): at 17:04

## 2021-06-27 NOTE — CONSULT NOTE ADULT - PROBLEM SELECTOR PROBLEM 1
Left ventricular systolic dysfunction
Type 2 diabetes mellitus with hypoglycemia without coma, unspecified whether long term insulin use
LINDSEY (acute kidney injury)

## 2021-06-27 NOTE — SWALLOW BEDSIDE ASSESSMENT ADULT - COMMENTS
Progress Note 6/27/21: 85 year old male with hx of diabetes who comes in for AMS, found to be hypoxic and in DKA with additional oliguric renal failure, intubated and sedated 6/20 in the ED due to worsening mental status and inability to protect airway and now with STEMI on medical management. Now extubated and transferred to the floor.    CTH 6/27/21: Age-appropriate involutional and ischemic gliotic changes. No hemorrhage.  CXR 6/27/21:  The lungs are clear.    Patient was seen upright at bedside with NGT in place. Patient was alert/awake though not verbally responsive to simple questions. Patient able to follow simple directions for purposes of oral mechanism assessment.
As per Hospitalist Attending Progress Note "85 year old male with hx of diabetes who comes in for AMS, found to be hypoxic and in DKA with additional oliguric renal failure, intubated and sedated 6/20 in the ED due to worsening mental status and inability to protect airway and now with STEMI on medical management. Now extubated and transferred to the floor."    CXR 6/27: IMPRESSION:  NG tube with tip in stomach.    CTH 6/27 IMPRESSION: Age-appropriate involutional and ischemic gliotic changes. No hemorrhage.    Of note, patient with RRT on this date. See report for details.     Order received, chart contents noted. Patient attempted to be seen for bedside swallow assessment, however upon arrival, patient leaving unit for CT scan. Will re-attempt as schedule permits.

## 2021-06-27 NOTE — CONSULT NOTE ADULT - ASSESSMENT
86 yo M with PMH of CAD s/p remote hx of PCI (unclear anatomy), uncontrolled T2DM, and HLD p/w hypoxia/AMS admitted to MICU for DKA on insulin gtt and likely late presentation MI. Intubated for AMS, and MICU course c/b ARF and shock liver- transferred to [facility] now in rapid a fib. Patient has an NGT and able to answer some questions. TTE: Severe global left ventricular systolic dysfunction.  Patient is currently on heparin gtt for late presentation MI.     Rapid a fib   - late presentation MI   - TTE:  Severe global left ventricular systolic dysfunction.  - start amiodarone 150 mg IV x1   - change beta blocker to lopressor 12.5 mg PO TID  - CTH: No hemorrhage.  - continue heparin gtt     Late presentation MI   - general cardiology following

## 2021-06-27 NOTE — PROGRESS NOTE ADULT - PROBLEM SELECTOR PLAN 1
Pt with hemodynamically mediated LINDSEY/ ATN in the setting of SIRS, STEMI, DKA and medication use (Telmisartan). No previous labs for review. On admission, SCr elevated at 2.09 (6/20). Peaked to 3.04 on 6/25. Today Cr improved to 2.4mg/dl.  Initial UA with proteinuria, hematuria, pyuria. Spot urine TP/CR 4.6.  Currently has a Lilly. Appears hypovolemic on exam.  C/w Holding Telmisartan. Please repeat the UA today. Monitor labs and urine output. Avoid NSAIDs, ACEI/ARBS for now. Dose medications as per eGFR Pt with hemodynamically mediated LINDSEY/ ATN in the setting of SIRS, STEMI, DKA and medication use (Telmisartan). No previous labs for review. On admission, SCr elevated at 2.09 (6/20). Peaked to 3.04 on 6/25. Today Cr improved to 2.4mg/dl.  Initial UA with proteinuria, hematuria, pyuria. Spot urine TP/CR 4.6.  Appears hypovolemic on exam.  C/w Holding Telmisartan. Please repeat the UA today, check bladder sono to r/o urinary retention given recent abbott removal. Start NS at 75cc/hr. Monitor labs and urine output. Avoid NSAIDs, ACEI/ARBS for now. Dose medications as per eGFR

## 2021-06-27 NOTE — PROVIDER CONTACT NOTE (OTHER) - BACKGROUND
patient admitted to  from MICU 6/26, admitted with hypercapnic respiratory failure and metabolic encephalopathy

## 2021-06-27 NOTE — CONSULT NOTE ADULT - ASSESSMENT
85 year old male with PMHx of DM2 and HTN who presented with AMS, found to be in DKA s/p insulin gtt. Patient was also noted to be hypoxic on presentation requiring intubation. Patient subsequently started on tube feeds. Patient is now currently extubated, but remains on tube feeds. Patient noted to have episode of hypoglycemia while on tube feeds. Endocrine Team consulted for further management.     Problem 1: DM2 c/b hypoglycemia  Hypoglycemic episode likely 2/2 to tube feeds being held overnight after patient pulled NGT  - Patient currently on continuous tube feeds. Notify Endocrine if there is a change.   - Continue NPH 11 units [id number]  - Continue Low dose correctional scale q6hrs  - Monitor FS q6hrs (Inpatient Goal 100-180 mg/dl)    Discharge Plan:   - Discharge recommendations pending inpatient course. Patient currently on tube feeds.   - Will need to verify with family if patient follows with an outpatient Endocrinologist.    Problem 2: Protein calorie malnutrition  - Patient currently on continuous tube feeds    Problem 3: HTN  - Currently at goal (Goal <130/80)  - Management per primary team    Mona Pope M.D  Endocrine Fellow  Pager #[phone] 85 year old male with PMHx of DM2 and HTN who presented with AMS, found to be in DKA s/p insulin gtt. Patient was also noted to be hypoxic on presentation requiring intubation. Patient subsequently started on tube feeds. Patient is now currently extubated, but remains on tube feeds. Patient noted to have episode of hypoglycemia while on tube feeds. Endocrine Team consulted for further management.     Problem 1: DM2 c/b hypoglycemia  Hypoglycemic episode likely 2/2 to tube feeds being held overnight after patient pulled NGT  - Patient currently on continuous tube feeds. Notify Endocrine if there is a change.   - Decrease NPH to 10 units [id number]  - Continue Low dose correctional scale q6hrs  - Monitor FS q6hrs (Inpatient Goal 100-180 mg/dl)    Discharge Plan:   - Discharge recommendations pending inpatient course. Patient currently on tube feeds.   - Will need to verify with family if patient follows with an outpatient Endocrinologist.    Problem 2: Protein calorie malnutrition  - Patient currently on continuous tube feeds    Problem 3: HTN  - Currently at goal (Goal <130/80)  - Management per primary team    Mona Pope M.D  Endocrine Fellow  Pager #[phone] 85 year old male with PMHx of DM2 and HTN who presented with AMS, found to be in DKA s/p insulin gtt. Patient was also noted to be hypoxic on presentation requiring intubation. Patient subsequently started on tube feeds. Patient is now currently extubated, but remains on tube feeds via NGT. Patient noted to have episode of hypoglycemia while on tube feeds. Endocrine Team consulted for further management.     Problem 1: DM2 c/b hypoglycemia  Hypoglycemic episode likely 2/2 to tube feeds being held overnight after patient pulled NGT  - Patient currently on continuous tube feeds. Notify Endocrine if there is a change.   - Decrease NPH to 10 units [id number]  - Continue Low dose correctional scale q6hrs  - Monitor FS q6hrs (Inpatient Goal 100-180 mg/dl)    Discharge Plan:   - Discharge recommendations pending inpatient course. Patient currently on tube feeds.   - Will need to verify with family if patient follows with an outpatient Endocrinologist.    Problem 2: Protein calorie malnutrition  - Patient currently on continuous tube feeds    Problem 3: HTN  - Currently at goal (Goal <130/80)  - Management per primary team  - Continue Coreg 3.1245 mg BID    Mona Pope M.D  Endocrine Fellow  Pager #[phone]

## 2021-06-27 NOTE — CONSULT NOTE ADULT - SUBJECTIVE AND OBJECTIVE BOX
HPI:  85 year old male with PMHx of DM2 and HTN who presented with AMS, found to be in DKA s/p insulin gtt. Patient was also noted to be hypoxic on presentation requiring intubation. Patient subsequently started on tube feeds. Patient is now currently extubated, but remains on tube feeds. Patient noted to have episode of hypoglycemia while on tube feeds. Endocrine Team consulted for further management.     Endocrine/Interval History:  Patient is a poor historian and unable to provide history.   Unable to reach patient's family.    Patient currently on continuous tube feeds and NPH 11 units q6hrs.  Patient was noted to have hypoglycemia last night and early this morning-- per nurse patient had pulled his NGT last night and was not receiving tube feeds for atleast 2 hours.   BG currently ~200s    PAST MEDICAL & SURGICAL HISTORY:  HTN (hypertension)  Diabetes, type I    FAMILY HISTORY:  Unable to obtain    Social History:  Unable to obtain    Outpatient Medications:  atenolol 50 mg oral tablet: 1 tab(s) orally once a day  colesevelam 625 mg oral tablet: 3 tab(s) orally 2 times a day  Folbee oral tablet: 1 tab(s) orally once a day  isosorbide mononitrate 120 mg oral tablet, extended release: 1 tab(s) orally once a day (in the morning)  telmisartan 80 mg oral tablet: 1 tab(s) orally once a day    MEDICATIONS  (STANDING):  aspirin  chewable 81 milliGRAM(s) Oral daily  carvedilol 3.125 milliGRAM(s) Oral every 12 hours  chlorhexidine 2% Cloths 1 Application(s) Topical daily  clopidogrel Tablet 75 milliGRAM(s) Oral daily  dextrose 40% Gel 15 Gram(s) Oral once  dextrose 5%. 1000 milliLiter(s) (50 mL/Hr) IV Continuous <Continuous>  dextrose 5%. 1000 milliLiter(s) (100 mL/Hr) IV Continuous <Continuous>  dextrose 5%. 500 milliLiter(s) (50 mL/Hr) IV Continuous <Continuous>  dextrose 50% Injectable 25 Gram(s) IV Push once  dextrose 50% Injectable 12.5 Gram(s) IV Push once  dextrose 50% Injectable 25 Gram(s) IV Push once  glucagon  Injectable 1 milliGRAM(s) IntraMuscular once  heparin  Infusion. 900 Unit(s)/Hr (9 mL/Hr) IV Continuous <Continuous>  insulin lispro (ADMELOG) corrective regimen sliding scale   SubCutaneous every 6 hours  insulin NPH human recombinant 11 Unit(s) SubCutaneous every 6 hours  sodium chloride 0.9%. 1000 milliLiter(s) (75 mL/Hr) IV Continuous <Continuous>    MEDICATIONS  (PRN):  heparin   Injectable 4500 Unit(s) IV Push every 6 hours PRN For aPTT less than 40  heparin   Injectable 2000 Unit(s) IV Push every 6 hours PRN For aPTT between 40 - 57    Allergies  dyes,iv dyes (Unknown)  iodinated radiocontrast agents (Hives)    Review of Systems:  Unable to obtain    Physical exam  VITALS: T(C): 36.4 (06-27-21 @ 15:20)  T(F): 97.6 (06-27-21 @ 15:20), Max: 98 (06-26-21 @ 16:00)  HR: 155 (06-27-21 @ 15:20) (68 - 165)  BP: 97/61 (06-27-21 @ 15:20) (97/61 - 167/85)  RR:  (15 - 20)  SpO2:  (96% - 100%)  Wt(kg): 56.7  General: NAD, awake  Eyes: no proptosis, anicteric  HEENT: atraumatic, normocephalic, MMM, NGT in place  Thyroid: normal size, no palpable nodules  CV: normal rate, regular rhythm, no peripheral edema  Pulm: CTA in anterior lung fields, no wheezes, rales, rhonchi  Abd: +BS, soft and non-tender to palpation  Ext: warm, dry, 2+ radial pulse  Neuro: A&Ox0  Psych: flat affect, not answering questions    POCT Blood Glucose.: 214 mg/dL (06-27-21 @ 13:01)  POCT Blood Glucose.: 197 mg/dL (06-27-21 @ 08:50)  POCT Blood Glucose.: 168 mg/dL (06-27-21 @ 07:37)  POCT Blood Glucose.: 166 mg/dL (06-27-21 @ 06:39)  POCT Blood Glucose.: 271 mg/dL (06-27-21 @ 05:34)  POCT Blood Glucose.: 46 mg/dL (06-27-21 @ 05:27)  POCT Blood Glucose.: 47 mg/dL (06-27-21 @ 05:24)  POCT Blood Glucose.: 180 mg/dL (06-27-21 @ 02:19)  POCT Blood Glucose.: 122 mg/dL (06-27-21 @ 02:00)  POCT Blood Glucose.: 63 mg/dL (06-27-21 @ 01:27)  POCT Blood Glucose.: 69 mg/dL (06-27-21 @ 01:21)  POCT Blood Glucose.: 97 mg/dL (06-26-21 @ 23:00)  POCT Blood Glucose.: 175 mg/dL (06-26-21 @ 16:55)  POCT Blood Glucose.: 191 mg/dL (06-26-21 @ 11:22)  POCT Blood Glucose.: 208 mg/dL (06-26-21 @ 05:20)  POCT Blood Glucose.: 138 mg/dL (06-25-21 @ 23:14)  POCT Blood Glucose.: 82 mg/dL (06-25-21 @ 17:05)  POCT Blood Glucose.: 106 mg/dL (06-25-21 @ 12:13)  POCT Blood Glucose.: 152 mg/dL (06-25-21 @ 05:03)  POCT Blood Glucose.: 134 mg/dL (06-24-21 @ 23:13)  POCT Blood Glucose.: 153 mg/dL (06-24-21 @ 17:13)                            10.2   7.98  )-----------( 149      ( 27 Jun 2021 06:11 )             32.1       06-27    145  |  95<L>  |  90<H>  ----------------------------<  312<H>  2.6<LL>   |  38<H>  |  2.46<H>    EGFR if : 27<L>  EGFR if non : 23<L>    Ca    9.4      06-27  Mg     2.4     06-27  Phos  3.7     06-27    TPro  6.5  /  Alb  2.8<L>  /  TBili  0.7  /  DBili  x   /  AST  49<H>  /  ALT  215<H>  /  AlkPhos  183<H>  06-26    HbA1c 8.0                       HPI:  85 year old male with PMHx of DM2 and HTN who presented with AMS, found to be in DKA s/p insulin gtt. Patient was also noted to be hypoxic on presentation requiring intubation. Patient subsequently started on tube feeds. Patient is now currently extubated, but remains on tube feeds. Patient noted to have episode of hypoglycemia while on tube feeds. Endocrine Team consulted for further management.     Endocrine/Interval History:  Patient is a poor historian and unable to provide history.   Unable to reach patient's family.    Patient currently on continuous tube feeds and NPH 11 units q6hrs.  Patient was noted to have hypoglycemia last night and early this morning-- per nurse patient had pulled his NGT last night and was not receiving tube feeds for atleast 2 hours.   BG currently ~200s    PAST MEDICAL & SURGICAL HISTORY:  HTN (hypertension)  Diabetes, type I    FAMILY HISTORY:  Unable to obtain    Social History:  Unable to obtain    Outpatient Medications:  atenolol 50 mg oral tablet: 1 tab(s) orally once a day  colesevelam 625 mg oral tablet: 3 tab(s) orally 2 times a day  Folbee oral tablet: 1 tab(s) orally once a day  isosorbide mononitrate 120 mg oral tablet, extended release: 1 tab(s) orally once a day (in the morning)  telmisartan 80 mg oral tablet: 1 tab(s) orally once a day    MEDICATIONS  (STANDING):  aspirin  chewable 81 milliGRAM(s) Oral daily  carvedilol 3.125 milliGRAM(s) Oral every 12 hours  chlorhexidine 2% Cloths 1 Application(s) Topical daily  clopidogrel Tablet 75 milliGRAM(s) Oral daily  dextrose 40% Gel 15 Gram(s) Oral once  dextrose 5%. 1000 milliLiter(s) (50 mL/Hr) IV Continuous <Continuous>  dextrose 5%. 1000 milliLiter(s) (100 mL/Hr) IV Continuous <Continuous>  dextrose 5%. 500 milliLiter(s) (50 mL/Hr) IV Continuous <Continuous>  dextrose 50% Injectable 25 Gram(s) IV Push once  dextrose 50% Injectable 12.5 Gram(s) IV Push once  dextrose 50% Injectable 25 Gram(s) IV Push once  glucagon  Injectable 1 milliGRAM(s) IntraMuscular once  heparin  Infusion. 900 Unit(s)/Hr (9 mL/Hr) IV Continuous <Continuous>  insulin lispro (ADMELOG) corrective regimen sliding scale   SubCutaneous every 6 hours  insulin NPH human recombinant 11 Unit(s) SubCutaneous every 6 hours  sodium chloride 0.9%. 1000 milliLiter(s) (75 mL/Hr) IV Continuous <Continuous>    MEDICATIONS  (PRN):  heparin   Injectable 4500 Unit(s) IV Push every 6 hours PRN For aPTT less than 40  heparin   Injectable 2000 Unit(s) IV Push every 6 hours PRN For aPTT between 40 - 57    Allergies  dyes,iv dyes (Unknown)  iodinated radiocontrast agents (Hives)    Review of Systems:  Unable to obtain    Physical exam  VITALS: T(C): 36.4 (06-27-21 @ 15:20)  T(F): 97.6 (06-27-21 @ 15:20), Max: 98 (06-26-21 @ 16:00)  HR: 155 (06-27-21 @ 15:20) (68 - 165)  BP: 97/61 (06-27-21 @ 15:20) (97/61 - 167/85)  RR:  (15 - 20)  SpO2:  (96% - 100%)  Wt(kg): 56.7  General: NAD, awake  Eyes: no proptosis, anicteric  HEENT: atraumatic, normocephalic, NGT in place  Thyroid: normal size, no palpable nodules  CV: normal rate, regular rhythm, no peripheral edema  Pulm: CTA in anterior lung fields, no wheezes, rales, rhonchi  Abd: +BS, soft and non-tender to palpation  Ext: warm, dry, 2+ radial pulse  Neuro: A&Ox0  Psych: flat affect, not answering questions    POCT Blood Glucose.: 214 mg/dL (06-27-21 @ 13:01)  POCT Blood Glucose.: 197 mg/dL (06-27-21 @ 08:50)  POCT Blood Glucose.: 168 mg/dL (06-27-21 @ 07:37)  POCT Blood Glucose.: 166 mg/dL (06-27-21 @ 06:39)  POCT Blood Glucose.: 271 mg/dL (06-27-21 @ 05:34)  POCT Blood Glucose.: 46 mg/dL (06-27-21 @ 05:27)  POCT Blood Glucose.: 47 mg/dL (06-27-21 @ 05:24)  POCT Blood Glucose.: 180 mg/dL (06-27-21 @ 02:19)  POCT Blood Glucose.: 122 mg/dL (06-27-21 @ 02:00)  POCT Blood Glucose.: 63 mg/dL (06-27-21 @ 01:27)  POCT Blood Glucose.: 69 mg/dL (06-27-21 @ 01:21)  POCT Blood Glucose.: 97 mg/dL (06-26-21 @ 23:00)  POCT Blood Glucose.: 175 mg/dL (06-26-21 @ 16:55)  POCT Blood Glucose.: 191 mg/dL (06-26-21 @ 11:22)  POCT Blood Glucose.: 208 mg/dL (06-26-21 @ 05:20)  POCT Blood Glucose.: 138 mg/dL (06-25-21 @ 23:14)  POCT Blood Glucose.: 82 mg/dL (06-25-21 @ 17:05)  POCT Blood Glucose.: 106 mg/dL (06-25-21 @ 12:13)  POCT Blood Glucose.: 152 mg/dL (06-25-21 @ 05:03)  POCT Blood Glucose.: 134 mg/dL (06-24-21 @ 23:13)  POCT Blood Glucose.: 153 mg/dL (06-24-21 @ 17:13)                            10.2   7.98  )-----------( 149      ( 27 Jun 2021 06:11 )             32.1       06-27    145  |  95<L>  |  90<H>  ----------------------------<  312<H>  2.6<LL>   |  38<H>  |  2.46<H>    EGFR if : 27<L>  EGFR if non : 23<L>    Ca    9.4      06-27  Mg     2.4     06-27  Phos  3.7     06-27    TPro  6.5  /  Alb  2.8<L>  /  TBili  0.7  /  DBili  x   /  AST  49<H>  /  ALT  215<H>  /  AlkPhos  183<H>  06-26    HbA1c 8.0

## 2021-06-27 NOTE — PROVIDER CONTACT NOTE (HYPOGLYCEMIA EVENT) - NS PROVIDER CONTACT BACKGROUND-HYPO
Age: 85y    Gender: Male    POCT Blood Glucose:  166 mg/dL (06-27-21 @ 06:39)  271 mg/dL (06-27-21 @ 05:34)  46 mg/dL (06-27-21 @ 05:27)  47 mg/dL (06-27-21 @ 05:24)  180 mg/dL (06-27-21 @ 02:19)  122 mg/dL (06-27-21 @ 02:00)  63 mg/dL (06-27-21 @ 01:27)  69 mg/dL (06-27-21 @ 01:21)      eMAR:  dextrose 50% Injectable   25 Gram(s) IV Push (06-27-21 @ 01:42)    insulin lispro (ADMELOG) corrective regimen sliding scale   1  SubCutaneous (06-26-21 @ 17:42)   1 Unit(s) SubCutaneous (06-26-21 @ 11:30)    insulin NPH human recombinant   11 Unit(s) SubCutaneous (06-26-21 @ 23:15)   11 Unit(s) SubCutaneous (06-26-21 @ 17:08)   11 Unit(s) SubCutaneous (06-26-21 @ 11:28)    
Age: 85y    Gender: Male    POCT Blood Glucose:  180 mg/dL (06-27-21 @ 02:19)  122 mg/dL (06-27-21 @ 02:00)  63 mg/dL (06-27-21 @ 01:27)  69 mg/dL (06-27-21 @ 01:21)  97 mg/dL (06-26-21 @ 23:00)  175 mg/dL (06-26-21 @ 16:55)  191 mg/dL (06-26-21 @ 11:22)  208 mg/dL (06-26-21 @ 05:20)      eMAR:  dextrose 50% Injectable   25 Gram(s) IV Push (06-27-21 @ 01:42)    insulin lispro (ADMELOG) corrective regimen sliding scale   1  SubCutaneous (06-26-21 @ 17:42)   1 Unit(s) SubCutaneous (06-26-21 @ 11:30)   2 Unit(s) SubCutaneous (06-26-21 @ 05:24)    insulin NPH human recombinant   5 Unit(s) SubCutaneous (06-26-21 @ 05:24)    insulin NPH human recombinant   11 Unit(s) SubCutaneous (06-26-21 @ 23:15)   11 Unit(s) SubCutaneous (06-26-21 @ 17:08)   11 Unit(s) SubCutaneous (06-26-21 @ 11:28)

## 2021-06-27 NOTE — CHART NOTE - NSCHARTNOTEFT_GEN_A_CORE
CT head- negative - will restart hep gtt    pt also developed Rafib with rvr 130-160s - ordered lopressor 5 mg iv push x 2 - but didn't help. Pts blood pressure after lopressor sbp 97. ordered 250cc NS bolus as pt has severe LV dfxn.  cardiology/EP house was recalled - changed coreg to lopressor 12.5mg TID and a dose of Amio 150mg iv x 1 given.  pt now in SR. Will sign off to continue monitoirng over night and give more amio if needed. will repeat LFTs in am.

## 2021-06-27 NOTE — PHYSICAL THERAPY INITIAL EVALUATION ADULT - ACTIVE RANGE OF MOTION EXAMINATION, REHAB EVAL
Patient is unable to move left UE / LE/Right UE Active ROM was WFL (within functional limits)/Right LE Active ROM was WFL (within functional limits)

## 2021-06-27 NOTE — PROGRESS NOTE ADULT - ASSESSMENT
85 year old male with hx of diabetes who comes in for AMS, found to be hypoxic and in DKA with additional oliguric renal failure, intubated and sedated 6/20 in the ED due to worsening mental status and inability to protect airway and now with STEMI on medical management. Now extubated and transferred to the floor.    Neuro  #AMS, likely metabolic encephalopathy iso hypercapnia and renal failure, now improved    Cardiac:   #STEMI in leads V1/V2 w/rising troponin and CKMB  -s/p ASA/plavix load; c/w ASA, Plavix; c/w hep gtt for 48h  -hold off statin d/t transaminitis  -c/f acute systolic chf, given pleural effusions and BNP 50k   -diuresed with bumex, now off d/t contraction alkalosis/lindsey, off bumex since 6/22  -no plans for cardiac cath until renal fxn improves  -f/u cardiology    #L femoral-deep peroneal artery byspass (on prelim [facility] Duplex arterial)  - w/stenosis of bypass tract w/o occlusion; likely chronic given discoloration and skin changes noted on LE; LLE perfused  - Consulted vascular and talked to resident on the phone, advised to continue heparin infusion until stable to get an angiogram of LE. Will follow-up official consult     GI  #LFT elevation, likely shock liver given acute rise in LFTs from WNL to 3000/1600 iso hypoperfusion from MI vs. less likely congestive hepatopathy  - LFTs trending down  - continue to monitor  - OG tube in place; currently on tube feeds    Renal:   #LINDSEY (no previous labs to assess baseline) c/b oliguric renal failure, likely ATN 2/2 hypoperfusion iso MI vs. hypovolemia prior to MI  - Cr uptrending likely iso ATN that is starting to plateau  - contraction alkalosis with hypokalemia  -bumex held, replete K po/iv, repeat BMP this afternoon  -renal ordered NS 75 ml/h x12h, knows her EF is 23%  -monitor fluid status, avoid fluid overload  - trend Lytes, keep K>4 and Mg>2    Endo  #DKA, resolved  - AG present but w/o BHB or acidosis; AG likely iso renal failure  - on NPH 11 Q6H; or 5 Q6H when NPO; requirements based off insulin gtt    ID:  SIRS w/hypothermia, tachycardia, leukocytosis, lactate w/o obvious source of infection; hypothermia resolved, likely was iso hypoperfusion from STEMI  - RLE w/crusted lesions without associated erythema, warmth, or exudates  - BCx pending NGTD, started empirically on vancomycin/zosyn/doxy (6/20/21- 6/21/21)  - off abx, monitor temp curve    Heme/Onc:   -DVT ppx: SQH 85 year old male with hx of diabetes who comes in for AMS, found to be hypoxic and in DKA with additional oliguric renal failure, intubated and sedated 6/20 in the ED due to worsening mental status and inability to protect airway and now with STEMI on medical management. Now extubated and transferred to the floor.    Neuro  #AMS, likely metabolic encephalopathy iso hypercapnia and renal failure, now improved    Cardiac:   #STEMI in leads V1/V2 w/rising troponin and CKMB  -s/p ASA/plavix load; c/w ASA, Plavix; c/w hep gtt for 48h  -hold off statin d/t transaminitis  -c/f acute systolic chf, given pleural effusions and BNP 50k   -echo LVEF 23%, severe global LV dysfxn, stage I diastolic dysfxn  -diuresed with bumex, now off d/t contraction alkalosis/lindsey, off bumex since 6/22  -no plans for cardiac cath until renal fxn improves  -f/u cardiology    #L femoral-deep peroneal artery bypass (on prelim [facility] Duplex arterial)  - w/stenosis of bypass tract w/o occlusion; likely chronic given discoloration and skin changes noted on LE; LLE perfused  -leg arterial doppler: No flow visualized in the right posterior tibial and  peroneal arteries.  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 Left runoff vessels not well visualized.  - Consulted vascular and talked to resident on the phone, advised to continue heparin infusion until stable to get an angiogram of LE. Will follow-up official consult     GI  #LFT elevation, likely shock liver given acute rise in LFTs from WNL to 3000/1600 iso hypoperfusion from MI vs. less likely congestive hepatopathy  - LFTs trending down  - continue to monitor  - OG tube in place; currently on tube feeds    Renal:   #LINDSEY (no previous labs to assess baseline) c/b oliguric renal failure, likely ATN 2/2 hypoperfusion iso MI vs. hypovolemia prior to MI  - Cr uptrending likely iso ATN that is starting to plateau, plateaued at creat 3.0 now trending down to 2.4 today  - contraction alkalosis with hypokalemia  -bumex held, replete K po/iv, repeat BMP this afternoon  -renal ordered NS 75 ml/h x12h, knows her EF is 23%  -monitor fluid status, avoid fluid overload  - trend Lytes, keep K>4 and Mg>2    Endo  #DKA, resolved  - AG present but w/o BHB or acidosis; AG likely iso renal failure  - on NPH 11 Q6H; or 5 Q6H when NPO; requirements based off insulin gtt    ID:  SIRS w/hypothermia, tachycardia, leukocytosis, lactate w/o obvious source of infection; hypothermia resolved, likely was iso hypoperfusion from STEMI  - RLE w/crusted lesions without associated erythema, warmth, or exudates  - BCx pending NGTD, started empirically on vancomycin/zosyn/doxy (6/20/21- 6/21/21)  - off abx, monitor temp curve    Heme/Onc:   -DVT ppx: SQH

## 2021-06-27 NOTE — CHART NOTE - NSCHARTNOTEFT_GEN_A_CORE
Called radiology on call for prelim read of chest xray for NGT placement and as per radiology on call NGT in place, ok to use

## 2021-06-27 NOTE — PROGRESS NOTE ADULT - PROBLEM SELECTOR PLAN 3
K 2.5, in setting of recent diuresis and poor Po intake  Would supplement with KCL 40meq and IV KCL 10meq x 3   Repeat BMP this afternoon, may require additional KCL supplementation

## 2021-06-27 NOTE — CHART NOTE - NSCHARTNOTEFT_GEN_A_CORE
Medicine PA Episodic Note    Notified by RN of pt. having blood glucose of 63. Pt. seen and examined at bedside, in NAD, AO x1-2 BASELINE, FOLLOWING commands, denies lightheadedness, dizziness, weakness, fatigue. VSS otherwise.    # Hypoglycemia   > Hypoglycemia protocol initiated  > Monitor FS closely  - ReStart NGT feeds after xray clearance  >  Monitor for s/s of hypo/hyperglycemia   > Monitor VS  > F/u AM labs   > Consider Endocrine consult per attending discretion     Will endorse to primary team in AM.        Hernando Abreu NP  Department of Medicine

## 2021-06-27 NOTE — PROVIDER CONTACT NOTE (OTHER) - RECOMMENDATIONS
provider to come to bedside and replace NG tube. consider medication for agitation and draw ABG for risk of hypercapnia after patient was taken off BiPAP early on 6/26. provider to come to bedside and replace NG tube. consider medication for agitation and draw ABG for risk of hypercapnia after patient was taken off BiPAP early on 6/26. Alternate methods suggested.

## 2021-06-27 NOTE — PROGRESS NOTE ADULT - SUBJECTIVE AND OBJECTIVE BOX
Dr. Keerthi Coates  Pager [phone]    PROGRESS NOTE:     Patient is a 85y old  Male who presents with a chief complaint of AMS, Hypoxia (27 Jun 2021 08:07)      SUBJECTIVE / OVERNIGHT EVENTS: pt denies chest pain/sob, on RA  ADDITIONAL REVIEW OF SYSTEMS: afebrile, on NGT tube feed    MEDICATIONS  (STANDING):  aspirin  chewable 81 milliGRAM(s) Oral daily  carvedilol 3.125 milliGRAM(s) Oral every 12 hours  chlorhexidine 2% Cloths 1 Application(s) Topical daily  clopidogrel Tablet 75 milliGRAM(s) Oral daily  dextrose 40% Gel 15 Gram(s) Oral once  dextrose 5%. 1000 milliLiter(s) (50 mL/Hr) IV Continuous <Continuous>  dextrose 5%. 1000 milliLiter(s) (100 mL/Hr) IV Continuous <Continuous>  dextrose 5%. 500 milliLiter(s) (50 mL/Hr) IV Continuous <Continuous>  dextrose 50% Injectable 25 Gram(s) IV Push once  dextrose 50% Injectable 12.5 Gram(s) IV Push once  dextrose 50% Injectable 25 Gram(s) IV Push once  glucagon  Injectable 1 milliGRAM(s) IntraMuscular once  heparin  Infusion. 900 Unit(s)/Hr (9 mL/Hr) IV Continuous <Continuous>  insulin lispro (ADMELOG) corrective regimen sliding scale   SubCutaneous every 6 hours  insulin NPH human recombinant 11 Unit(s) SubCutaneous every 6 hours  sodium chloride 0.9%. 1000 milliLiter(s) (75 mL/Hr) IV Continuous <Continuous>    MEDICATIONS  (PRN):  heparin   Injectable 4500 Unit(s) IV Push every 6 hours PRN For aPTT less than 40  heparin   Injectable 2000 Unit(s) IV Push every 6 hours PRN For aPTT between 40 - 57      CAPILLARY BLOOD GLUCOSE      POCT Blood Glucose.: 197 mg/dL (27 Jun 2021 08:50)  POCT Blood Glucose.: 168 mg/dL (27 Jun 2021 07:37)  POCT Blood Glucose.: 166 mg/dL (27 Jun 2021 06:39)  POCT Blood Glucose.: 271 mg/dL (27 Jun 2021 05:34)  POCT Blood Glucose.: 46 mg/dL (27 Jun 2021 05:27)  POCT Blood Glucose.: 47 mg/dL (27 Jun 2021 05:24)  POCT Blood Glucose.: 180 mg/dL (27 Jun 2021 02:19)  POCT Blood Glucose.: 122 mg/dL (27 Jun 2021 02:00)  POCT Blood Glucose.: 63 mg/dL (27 Jun 2021 01:27)  POCT Blood Glucose.: 69 mg/dL (27 Jun 2021 01:21)  POCT Blood Glucose.: 97 mg/dL (26 Jun 2021 23:00)  POCT Blood Glucose.: 175 mg/dL (26 Jun 2021 16:55)    I&O's Summary    26 Jun 2021 07:01  -  27 Jun 2021 07:00  --------------------------------------------------------  IN: 390 mL / OUT: 1450 mL / NET: -1060 mL        PHYSICAL EXAM:  Vital Signs Last 24 Hrs  T(C): 36.5 (27 Jun 2021 09:04), Max: 36.7 (26 Jun 2021 16:00)  T(F): 97.7 (27 Jun 2021 09:04), Max: 98 (26 Jun 2021 16:00)  HR: 75 (27 Jun 2021 09:04) (65 - 78)  BP: 167/85 (27 Jun 2021 09:04) (125/56 - 167/85)  BP(mean): 78 (26 Jun 2021 17:00) (73 - 93)  RR: 18 (27 Jun 2021 09:04) (15 - 20)  SpO2: 100% (27 Jun 2021 09:04) (96% - 100%)  CONSTITUTIONAL: NAD, cachectic with muscle wasting, frail and thin man  Heent: neck supple, NGT in place  RESPIRATORY: Normal respiratory effort; lungs mainly clear  CARDIOVASCULAR: Regular rate and rhythm, normal S1 and S2, no murmur/rub/gallop; No lower extremity edema; Peripheral pulses +pedal pulse  ABDOMEN: Nontender to palpation, normoactive bowel sounds, no rebound/guarding;   MUSCULOSKELETAL: no clubbing or cyanosis of digits; no joint swelling or tenderness to palpation  PSYCH: awake and alert    LABS:                        10.2   7.98  )-----------( 149      ( 27 Jun 2021 06:11 )             32.1     06-27    145  |  95<L>  |  90<H>  ----------------------------<  312<H>  2.6<LL>   |  38<H>  |  2.46<H>    Ca    9.4      27 Jun 2021 06:11  Phos  3.7     06-27  Mg     2.4     06-27    TPro  6.5  /  Alb  2.8<L>  /  TBili  0.7  /  DBili  x   /  AST  49<H>  /  ALT  215<H>  /  AlkPhos  183<H>  06-26    PT/INR - ( 26 Jun 2021 00:54 )   PT: 15.3 sec;   INR: 1.36 ratio         PTT - ( 27 Jun 2021 06:11 )  PTT:68.3 sec    RADIOLOGY & ADDITIONAL TESTS:  Results Reviewed:   Imaging Personally Reviewed:  < from: Xray Chest 1 View- PORTABLE-Urgent (Xray Chest 1 View- PORTABLE-Urgent .) (06.25.21 @ 18:55) >    IMPRESSION:  Enteric tube tip is in the stomach. Left subclavian line has been removed.  partialclearing at the lung bases. No pneumothorax or other interval change.      < from: TTE with Doppler (w/Cont) (06.21.21 @ 10:19) >  CONCLUSIONS:  LVEF 23%  1. Mitral annular calcification, otherwise normal mitral  valve. Minimal mitral regurgitation.  2. Calcified trileaflet aortic valve with normal opening.  Mild aortic regurgitation.  3. Normal left ventricular internal dimensions and wall  thicknesses.  4. Severe global left ventricular systolic dysfunction.  Endocardialvisualization enhanced with intravenous  injection of echo contrast (Definity).  5. Mild diastolic dysfunction (Stage I).  6. The right ventricle is not well visualized; grossly  normal right ventricular systolic function.  7. Estimated right ventricular systolic pressure equals 52  mm Hg, assuming right atrial pressure equals 10 mm Hg,  consistent with moderate pulmonary hypertension.  8. Left pleural effusion.  *** No previous Echo exam.    Electrocardiogram Personally Reviewed:    COORDINATION OF CARE:  Care Discussed with Consultants/Other Providers [Y/N]: Renal fellow Dr. Singh, know his EF is 23%, NS hydration and repeat BMP this afternoon, hypokalemia repleted  Prior or Outpatient Records Reviewed [Y/N]:

## 2021-06-27 NOTE — PROVIDER CONTACT NOTE (HYPOGLYCEMIA EVENT) - NS PROVIDER CONTACT SITUATION-HYPO
patient hypoglycemic to 47, repeat 46 - lethargic and change in mental status patient hypoglycemic to 47, repeat 46 - lethargic and change in mental status - RRT called

## 2021-06-27 NOTE — CONSULT NOTE ADULT - SUBJECTIVE AND OBJECTIVE BOX
CHIEF COMPLAINT: Ra    HISTORY OF PRESENT ILLNESS:  84 yo M with PMH of CAD s/p remote hx of PCI (unclear anatomy), uncontrolled T2DM, and HLD p/w hypoxia/AMS admitted to MICU for DKA on insulin gtt and likely late presentation MI. Intubated for AMS, and MICU course c/b ARF and shock liver- transferred to [facility] now in rapid a fib. Patient has an NGT and able to answer some questions. TTE: Severe global left ventricular systolic dysfunction.  Patient is currently on heparin gtt for late presentation MI.       Allergies    dyes,iv dyes (Unknown)  iodinated radiocontrast agents (Hives)    Intolerances    	    MEDICATIONS:  aspirin  chewable 81 milliGRAM(s) Oral daily  clopidogrel Tablet 75 milliGRAM(s) Oral daily  heparin   Injectable 4500 Unit(s) IV Push every 6 hours PRN  heparin   Injectable 2000 Unit(s) IV Push every 6 hours PRN  heparin  Infusion. 900 Unit(s)/Hr IV Continuous <Continuous>  metoprolol tartrate 12.5 milliGRAM(s) Oral three times a day  insulin lispro (ADMELOG) corrective regimen sliding scale   SubCutaneous every 6 hours  insulin NPH human recombinant 11 Unit(s) SubCutaneous every 6 hours    PAST MEDICAL & SURGICAL HISTORY:  HTN (hypertension)  Diabetes, type I        FAMILY HISTORY:      SOCIAL HISTORY:    [ ] Non-smoker  [ ] Smoker  [ ] Alcohol  [ ] Denies Alcohol      REVIEW OF SYSTEMS:  CONSTITUTIONAL: no fevers or chills  EYES/ENT: No visual changes; No vertigo or throat pain  NECK: No mass  RESPIRATORY:  No cough, wheezing, chills or hemoptysis, SOB  CARDIOVASCULAR: No chest pain, palpitations, passing out, dizziness, or leg swelling  GASTROINTESTINAL: No abdominal. No nausea/vomiting/melena  Genitourinary: No dysuria,   NEUROLOGICAL: No numbness, + weakness  SKIN: No itching, burning, rashes or lesions      PHYSICAL EXAM:  T(C): 36.4 (06-27-21 @ 15:20), Max: 36.7 (06-26-21 @ 20:00)  HR: 155 (06-27-21 @ 15:20) (68 - 165)  BP: 97/61 (06-27-21 @ 15:20) (97/61 - 167/85)  RR: 17 (06-27-21 @ 15:20) (15 - 20)  SpO2: 100% (06-27-21 @ 15:20) (96% - 100%)    Vital Signs Last 24 Hrs  T(C): 36.4 (27 Jun 2021 15:20), Max: 36.7 (26 Jun 2021 20:00)  T(F): 97.6 (27 Jun 2021 15:20), Max: 98 (26 Jun 2021 20:00)  HR: 155 (27 Jun 2021 15:20) (68 - 165)  BP: 97/61 (27 Jun 2021 15:20) (97/61 - 167/85)  BP(mean): 78 (26 Jun 2021 17:00) (78 - 78)  RR: 17 (27 Jun 2021 15:20) (15 - 20)  SpO2: 100% (27 Jun 2021 15:20) (96% - 100%)    I&O's Summary    26 Jun 2021 07:01  -  27 Jun 2021 07:00  --------------------------------------------------------  IN: 390 mL / OUT: 1450 mL / NET: -1060 mL          Appearance: Normal	  HEENT:   dry oral mucosa	  Cardiovascular: IRREG S1 S2, No JVD, No murmurs, No edema  Respiratory: Lungs clear to auscultation	  Psychiatry: A & O x 2,  Gastrointestinal:  Soft, Non-tender, + BS	  Skin: No rashes, No ecchymoses, No cyanosis	  Neurologic: Left arm weakness   Extremities: Warm and well perfused. 2+ pulses bilaterally        LABS:	 	    CBC Full  -  ( 27 Jun 2021 06:11 )  WBC Count : 7.98 K/uL  Hemoglobin : 10.2 g/dL  Hematocrit : 32.1 %  Platelet Count - Automated : 149 K/uL  Mean Cell Volume : 92.0 fL  Mean Cell Hemoglobin : 29.2 pg  Mean Cell Hemoglobin Concentration : 31.8 gm/dL  Auto Neutrophil # : 5.90 K/uL  Auto Lymphocyte # : 0.69 K/uL  Auto Monocyte # : 1.14 K/uL  Auto Eosinophil # : 0.16 K/uL  Auto Basophil # : 0.02 K/uL  Auto Neutrophil % : 73.9 %  Auto Lymphocyte % : 8.6 %  Auto Monocyte % : 14.3 %  Auto Eosinophil % : 2.0 %  Auto Basophil % : 0.3 %    06-27    145  |  95<L>  |  90<H>  ----------------------------<  312<H>  2.6<LL>   |  38<H>  |  2.46<H>  06-26    143  |  95<L>  |  78<H>  ----------------------------<  133<H>  3.6   |  32<H>  |  2.91<H>    Ca    9.4      27 Jun 2021 06:11  Ca    9.5      26 Jun 2021 00:54  Phos  3.7     06-27  Phos  5.2     06-26  Mg     2.4     06-27  Mg     2.1     06-26    TPro  6.5  /  Alb  2.8<L>  /  TBili  0.7  /  DBili  x   /  AST  49<H>  /  ALT  215<H>  /  AlkPhos  183<H>  06-26      proBNP:   Lipid Profile:   HgA1c:   TSH:     CARDIAC MARKERS:  HsT: peaked 20359-->5170     TELEMETRY: Rapid A fib 	    ECG:  Rapid Afib 	    PREVIOUS DIAGNOSTIC TESTING:    TTE with Doppler (w/Cont) (06.21.21 @ 10:19)  DIMENSIONS:  Dimensions:     Normal Values:  LA:     2.6 cm    2.0 - 4.0 cm  Ao:     3.4 cm    2.0 - 3.8 cm  SEPTUM: 0.6 cm    0.6 - 1.2 cm  PWT:    0.7 cm    0.6 - 1.1 cm  LVIDd:  5.6 cm    3.0 - 5.6 cm  LVIDs:  5.0 cm    1.8 - 4.0 cm  Derived Variables:  LVMI: 78 g/m2  RWT: 0.25  Fractional short: 11 %  Ejection Fraction (Itzicholtz): 23 %  ------------------------------------------------------------------------  OBSERVATIONS:  Mitral Valve: Mitral annular calcification, otherwise  normal mitral valve. Minimal mitral regurgitation.  Aortic Root: Normal aortic root.  Aortic Valve: Calcified trileaflet aortic valve with normal  opening. Mild aortic regurgitation.  Left Atrium: Normal left atrium.  Left Ventricle: Severe global left ventricular systolic  dysfunction.  Endocardial visualization enhanced with  intravenous injection of echo contrast (Definity). Normal  left ventricular internal dimensions and wall thicknesses.  Mild diastolic dysfunction (Stage I).  Right Heart: Normal right atrium. The right ventricle is  not well visualized; grossly normal right ventricular  systolic function. Normal tricuspid valve. Minimal  tricuspid regurgitation. Normal pulmonic valve.  Pericardium/PleuraNormal pericardium with no pericardial  effusion. Left pleural effusion.  Hemodynamic: Estimated right ventricular systolic pressure  equals 52 mm Hg, assuming right atrial pressure equals 10  mm Hg, consistent with moderate pulmonary hypertension.  ------------------------------------------------------------------------  CONCLUSIONS:  1. Mitral annular calcification, otherwise normal mitral  valve. Minimal mitral regurgitation.  2. Calcified trileaflet aortic valve with normal opening.  Mild aortic regurgitation.  3. Normal left ventricular internal dimensions and wall  thicknesses.  4. Severe global left ventricular systolic dysfunction.  Endocardialvisualization enhanced with intravenous  injection of echo contrast (Definity).  5. Mild diastolic dysfunction (Stage I).  6. The right ventricle is not well visualized; grossly  normal right ventricular systolic function.  7. Estimated right ventricular systolic pressure equals 52  mm Hg, assuming right atrial pressure equals 10 mm Hg,  consistent with moderate pulmonary hypertension.  8. Left pleural effusion.    CT Head No Cont (06.27.21 @ 12:26)   IMPRESSION: Age-appropriate involutional and ischemic gliotic changes. No hemorrhage.

## 2021-06-27 NOTE — CHART NOTE - NSCHARTNOTEFT_GEN_A_CORE
Patient is 85 year old male with hx of diabetes who comes in for AMS, found to be hypoxic and in DKA with additional oliguric renal failure, intubated and sedated 6/20 in the ED due to worsening mental status and inability to protect airway ,intubated and extubated on 6/25, on Bipap prrn , MICU downgrade to 422 around midnight, restarted on NPH insulin , received 11 units before reaching the unit. Patient pulled out NGT , which was replaced and confirmed by chest xray. Started on right sided mittens after assessment. During assessment pt LUE found to be weak , see previous note, ordered urgent CTH, which was expedited by calling ED nurse in charge and as per them , can bring the patient down. Patient was found unresponsive by primary RN when she attempted to get the patient ready for CT scan. S/p RRT- patient was hypoglycemic to 46. 1 amp D50w was given . Patient became more alert and responding to commands, but still looks tired. ABG was sent. Patient was on he[paz drip for occlusion of previous fem-pop as per vascular. Heparin drip placed on hold until CTH is done and resulted due to LUE weakness. Will recheck FS in an hour and if stable, plan is to take the patient down for CTH. Notified and d/w/ Hospitalist on call  regarding s/p RRT. Endorse to primary day team to f/u Patient is 85 year old male with hx of diabetes who comes in for AMS, found to be hypoxic and in DKA with additional oliguric renal failure, intubated and sedated 6/20 in the ED due to worsening mental status and inability to protect airway ,intubated and extubated on 6/25, on Bipap prrn , MICU downgrade to 422 around midnight, restarted on NPH insulin , received 11 units before reaching the unit. Patient pulled out NGT , which was replaced and confirmed by chest xray. Started on right sided mittens after assessment. During assessment pt LUE found to be weak , see previous note, ordered urgent CTH, which was expedited by calling ED nurse in charge and as per them , can bring the patient down. Patient was found unresponsive by primary RN when she attempted to get the patient ready for CT scan. S/p RRT- patient was hypoglycemic to 46. 1 amp D50w was given . Patient became more alert and responding to commands, but still looks tired. ABG was sent. Patient was on he[paz drip for occlusion of previous fem-pop as per vascular. Heparin drip placed on hold until CTH is done and resulted due to LUE weakness. Will recheck FS in an hour and if stable, plan is to take the patient down for CTH. Notified and d/w/ Hospitalist on call  regarding  RRT. Endorsed to primary day team to f/u Patient is 85 year old male with hx of diabetes who comes in for AMS, found to be hypoxic and in DKA with additional oliguric renal failure, intubated and sedated 6/20 in the ED due to worsening mental status and inability to protect airway ,intubated and extubated on 6/25, on Bipap prrn , MICU downgrade to 422 around midnight, restarted on NPH insulin , received 11 units before reaching the unit. Patient pulled out NGT , which was replaced and confirmed by chest xray. Started on right sided mittens after assessment. During assessment pt LUE found to be weak , see previous note, ordered urgent CTH, which was expedited by calling ED nurse in charge and as per them , can bring the patient down. Patient was found unresponsive by primary RN when she attempted to get the patient ready for CT scan. S/p RRT- patient was hypoglycemic to 46. 1 amp D50w was given . Patient became more alert and responding to commands, but still looks tired. ABG was sent. Patient was on he[paz drip for occlusion of previous fem-pop as per vascular. Heparin drip placed on hold until CTH is done   due to  LUE weakness. Will recheck FS in an hour and if stable, plan is to take the patient down for CTH. Notified and d/w/ Hospitalist on call  regarding  RRT and about new LUE weakness noted after transferring from MICU and  greed with the plan of CTH. Endorsed to primary day team to f/u

## 2021-06-27 NOTE — PROVIDER CONTACT NOTE (CHANGE IN STATUS NOTIFICATION) - ASSESSMENT
pt resting in bed, with tube feedings, and IVF running. No complaints of distress, VSS, HR noted to be 170-180s

## 2021-06-27 NOTE — PROVIDER CONTACT NOTE (OTHER) - ACTION/TREATMENT ORDERED:
Anesthesia ROS/Med Hx        Pulmonary Review:  The patient is a former smoker.     Neuro/Psych Review:    Negative for all neuro/psych ROS    Cardiovascular Review:    Exercise tolerance: good  Pt. positive for hypertension - well controlled    GI/HEPATIC/RENAL Review:    Pt. negative for GI/Hepatic/Renal ROS    End/Other Review:    Pt. negative for endo/other ROS  Overall Review of Systems Comments:  Past Medical History    High blood pressure                                           LABS (Metabolic Panel)  No results found for: SODIUM  No results found for: POTASSIUM  No results found for: CHLORIDE  No results found for: GLUCOSE  No results found for: CALCIUM  No results found for: CO2  No results found for: BUN  No results found for: CREATININE    LABS (CBC)  No results found for: WBC  No results found for: RBC  No results found for: HCT  No results found for: HGB  No results found for: PLT    No results found for: INR      Anesthesia Plan  ASA Status: 2  Anesthesia Type: Regional  Reviewed: Lab Results, NPO Status, Medications, Past Med History, Problem List, Allergies, Patient Summary, EKG and Beta Blocker Status  The proposed anesthetic plan, including its risks and benefits, have been discussed with the Patient - along with the risks and benefits of alternatives.  Questions were encouraged and answered and the patient and/or representative agrees to proceed.  Blood Products: Not Anticipated      Physical Exam  Mallampati: II  TM Distance: >3 FB  Neck ROM: Full  Cardio Rhythm: Regular  Cardio Rate: Normal  cardiovascular exam normal  Breath sounds clear to auscultation:  Yes  pulmonary exam normal  abdominal exam normal  dental exam normal       no need to do ABG at this time, QTC too long to give any medication for agitation per Hernando Abreu NP. Ordered R hand unsecured mitten for pulling at lines/tubes. no need to do ABG at this time, QTC too long to give any medication for agitation per Hernando Abreu NP.Ordered R hand unsecured mitten for pulling at lines/tubes. -RN escalated to ADN restraint order

## 2021-06-27 NOTE — CHART NOTE - NSCHARTNOTEFT_GEN_A_CORE
Patient is a MICU down grade, igned out by LISA vilchis midnight. Immediately after sign out, patient pulled out NGT, which was replaced and while assessment at bedside, noticed patient has LUE weakness- with no strength, flaccid, Patient is alert able to follow rest of the commands with right Upper extremity, but not with LUE, unable to squeeze, and move the fingers. As per primary RN, patient was received from MICU around shift change and patient was received like that. None of the MICU notes mentioning about the LUE. Called MAR back who talked to MICU who confirmed , this is new change- may be due to anoxic brain injury which is less likely as pt was intubated in MICU until 6/25. CTH urgent ordered to r/o stroke. Will f/u Patient is a MICU down grade, signed out by MAR around midnight. Immediately after sign out, patient pulled out NGT. Assessed patient at bedside, noticed patient has LUE weakness- with no strength, flaccid, Patient is alert able to follow rest of the commands with right Upper extremity, but not with LUE, unable to squeeze, and move the fingers. As per primary RN, patient was received from MICU around shift change and patient was received like that. None of the MICU notes mentioning about the LUE weakness. Called MAR back who communicated with MICU who confirmed , this is new change- may be due to anoxic brain injury which is less likely as pt was intubated in MICU until 6/25.or due to ?new stroke, but not sure about the time of onset of weakness. CTH urgent ordered to r/o stroke. Will f/u Patient is a MICU down grade, signed out by MAR around midnight. Immediately after sign out, patient pulled out NGT. Assessed patient at bedside, noticed patient has LUE weakness- with no strength, flaccid, with motor strength 0/5, Patient is alert able to follow rest of the commands with right Upper extremity, but not with LUE, unable to squeeze, and move the fingers. As per primary RN, patient was received from MICU around shift change and patient was received with LUE weakness. None of the MICU notes mentioning about the LUE weakness. Called MAR back who communicated with MICU who confirmed , this is new change- may be due to anoxic brain injury which is less likely as pt was intubated in MICU until 6/25.or due to ?new stroke, but not sure about the time of onset of weakness. CTH urgent ordered to r/o stroke. Will f/u Patient is a MICU down grade, signed out by MAR around midnight. Immediately after sign out, patient pulled out NGT. Assessed patient at bedside, noticed patient has LUE weakness- with motor strength of  1/5, Patient is alert oriented 1-2, able to follow rest of the commands with right Upper extremity, but not with LUE, unable to squeeze, but move  the fingers very mildly when asked to do so. Patient is non-verbal, but following commands . There is no asymmetric facial droop and the rest of the neuro exam is non-focal.  As per primary RN, patient was received from MICU around shift change and patient was received with LUE weakness. None of the MICU notes mentioning about the LUE weakness. Called MAR back who communicated with MICU who confirmed , this is new change- may be due to anoxic brain injury which is less likely as pt was intubated in MICU until 6/25.or due to ?new stroke, but not sure about the time of onset of weakness. CTH urgent ordered to r/o stroke. Will f/u

## 2021-06-27 NOTE — PROVIDER CONTACT NOTE (HYPOGLYCEMIA EVENT) - NS PROVIDER CONTACT RECOMMEND-HYPO
RRT called for hypoglycemia and change in mental status
25mg IV push of dextrose 50% until enteral access is re-established - continue to monitor closely

## 2021-06-27 NOTE — PROVIDER CONTACT NOTE (HYPOGLYCEMIA EVENT) - NS PROVIDER CONTACT SITUATION-HYPO
patient pulled out NG tube and had been not receiving enteral nutrition for a few minutes- checked fingerstick 30 minutes following patient removal of NG tube and was hypoglycemic to 69, repeat 63

## 2021-06-27 NOTE — PROGRESS NOTE ADULT - SUBJECTIVE AND OBJECTIVE BOX
[facility] DIVISION OF KIDNEY DISEASES AND HYPERTENSION -- FOLLOW UP NOTE  --------------------------------------------------------------------------------  Marcus Singh   Nephrology Fellow  Pager NS: [phone]/ [facility]: [phone]  (After 5 pm or on weekends please page the on-call fellow, can view the schedule on [employer]. Login is stanley larson, schedule under [facility] medicine, psych, derm.      Patient is a 85y old  Male who presents with a chief complaint of AMS, Hypoxia (26 Jun 2021 08:14)      24 hour events/subjective: Patient seen and examined at the bedside. Vital signs, labs, medications reviewed. Transferred out of the MICU overnight. Cr down to 2.45mg/dl, K 2.5, pH 7.52 with pco2 51. Had RRT early this AM for AMS, FSG 46 given 1 amp of D50 with improvement of FS to 271.        PAST HISTORY  --------------------------------------------------------------------------------  No significant changes to PMH, PSH, FHx, SHx, unless otherwise noted    ALLERGIES & MEDICATIONS  --------------------------------------------------------------------------------  Allergies    dyes,iv dyes (Unknown)  iodinated radiocontrast agents (Hives)    Intolerances      Standing Inpatient Medications  aspirin  chewable 81 milliGRAM(s) Oral daily  carvedilol 3.125 milliGRAM(s) Oral every 12 hours  chlorhexidine 2% Cloths 1 Application(s) Topical daily  clopidogrel Tablet 75 milliGRAM(s) Oral daily  dextrose 40% Gel 15 Gram(s) Oral once  dextrose 5%. 1000 milliLiter(s) IV Continuous <Continuous>  dextrose 5%. 1000 milliLiter(s) IV Continuous <Continuous>  dextrose 5%. 500 milliLiter(s) IV Continuous <Continuous>  dextrose 50% Injectable 25 Gram(s) IV Push once  dextrose 50% Injectable 12.5 Gram(s) IV Push once  dextrose 50% Injectable 25 Gram(s) IV Push once  glucagon  Injectable 1 milliGRAM(s) IntraMuscular once  heparin  Infusion. 900 Unit(s)/Hr IV Continuous <Continuous>  insulin lispro (ADMELOG) corrective regimen sliding scale   SubCutaneous every 6 hours  insulin NPH human recombinant 11 Unit(s) SubCutaneous every 6 hours  potassium chloride  10 mEq/100 mL IVPB 10 milliEquivalent(s) IV Intermittent every 1 hour    PRN Inpatient Medications  heparin   Injectable 4500 Unit(s) IV Push every 6 hours PRN  heparin   Injectable 2000 Unit(s) IV Push every 6 hours PRN      REVIEW OF SYSTEMS  --------------------------------------------------------------------------------  Unable to obtain 2/2 clinical condition  >>> <<<    VITALS/PHYSICAL EXAM  --------------------------------------------------------------------------------  T(C): 36.7 (06-27-21 @ 06:50), Max: 36.7 (06-26-21 @ 16:00)  HR: 69 (06-27-21 @ 06:50) (65 - 78)  BP: 145/64 (06-27-21 @ 06:50) (125/56 - 160/70)  RR: 17 (06-27-21 @ 06:50) (13 - 20)  SpO2: 96% (06-27-21 @ 06:50) (96% - 100%)  Wt(kg): --        06-26-21 @ 07:01  -  06-27-21 @ 07:00  --------------------------------------------------------  IN: 390 mL / OUT: 1450 mL / NET: -1060 mL      Physical Exam:    	Gen: NAD  	HEENT: Anicteric  	Pulm: CTA B/L  	CV: S1S2  	Abd: Soft, +BS   	MSK: No LE edema B/L  	Neuro: Awake  	Skin: Warm and dry  	Vascular access:      LABS/STUDIES  --------------------------------------------------------------------------------              10.2   7.98  >-----------<  149      [06-27-21 @ 06:11]              32.1     145  |  95  |  90  ----------------------------<  312      [06-27-21 @ 06:11]  2.6   |  38  |  2.46        Ca     9.4     [06-27-21 @ 06:11]      Mg     2.4     [06-27-21 @ 06:11]      Phos  3.7     [06-27-21 @ 06:11]    TPro  6.5  /  Alb  2.8  /  TBili  0.7  /  DBili  x   /  AST  49  /  ALT  215  /  AlkPhos  183  [06-26-21 @ 00:54]    PT/INR: PT 15.3 , INR 1.36       [06-26-21 @ 00:54]  PTT: 68.3       [06-27-21 @ 06:11]      Creatinine Trend:  SCr 2.46 [06-27 @ 06:11]  SCr 2.91 [06-26 @ 00:54]  SCr 3.04 [06-25 @ 00:34]  SCr 2.97 [06-24 @ 01:11]  SCr 2.81 [06-23 @ 02:21]    Urinalysis - [06-20-21 @ 16:48]      Color Yellow / Appearance Clear / SG 1.032 / pH 6.5      Gluc >= 1000 mg/dL / Ketone Trace  / Bili Negative / Urobili <2 mg/dL       Blood Moderate / Protein >600 mg/dL / Leuk Est Negative / Nitrite Negative      RBC 52 / WBC 9 / Hyaline 1 / Gran  / Sq Epi  / Non Sq Epi 8 / Bacteria Few    Urine Creatinine 7      [06-21-21 @ 14:43]  Urine Protein 32      [06-21-21 @ 14:43]         [facility] DIVISION OF KIDNEY DISEASES AND HYPERTENSION -- FOLLOW UP NOTE  --------------------------------------------------------------------------------  Marcus Singh   Nephrology Fellow  Pager NS: [phone]/ [facility]: [phone]  (After 5 pm or on weekends please page the on-call fellow, can view the schedule on [employer]. Login is stanley larson, schedule under [facility] medicine, psych, derm.      Patient is a 85y old  Male who presents with a chief complaint of AMS, Hypoxia (26 Jun 2021 08:14)      24 hour events/subjective: Patient seen and examined at the bedside. Vital signs, labs, medications reviewed. Transferred out of the MICU overnight. Cr down to 2.45mg/dl, K 2.5, pH 7.52 with pco2 51. Had RRT early this AM for AMS, FSG 46 given 1 amp of D50 with improvement of FS to 271.        PAST HISTORY  --------------------------------------------------------------------------------  No significant changes to PMH, PSH, FHx, SHx, unless otherwise noted    ALLERGIES & MEDICATIONS  --------------------------------------------------------------------------------  Allergies    dyes,iv dyes (Unknown)  iodinated radiocontrast agents (Hives)    Intolerances      Standing Inpatient Medications  aspirin  chewable 81 milliGRAM(s) Oral daily  carvedilol 3.125 milliGRAM(s) Oral every 12 hours  chlorhexidine 2% Cloths 1 Application(s) Topical daily  clopidogrel Tablet 75 milliGRAM(s) Oral daily  dextrose 40% Gel 15 Gram(s) Oral once  dextrose 5%. 1000 milliLiter(s) IV Continuous <Continuous>  dextrose 5%. 1000 milliLiter(s) IV Continuous <Continuous>  dextrose 5%. 500 milliLiter(s) IV Continuous <Continuous>  dextrose 50% Injectable 25 Gram(s) IV Push once  dextrose 50% Injectable 12.5 Gram(s) IV Push once  dextrose 50% Injectable 25 Gram(s) IV Push once  glucagon  Injectable 1 milliGRAM(s) IntraMuscular once  heparin  Infusion. 900 Unit(s)/Hr IV Continuous <Continuous>  insulin lispro (ADMELOG) corrective regimen sliding scale   SubCutaneous every 6 hours  insulin NPH human recombinant 11 Unit(s) SubCutaneous every 6 hours  potassium chloride  10 mEq/100 mL IVPB 10 milliEquivalent(s) IV Intermittent every 1 hour    PRN Inpatient Medications  heparin   Injectable 4500 Unit(s) IV Push every 6 hours PRN  heparin   Injectable 2000 Unit(s) IV Push every 6 hours PRN      REVIEW OF SYSTEMS  --------------------------------------------------------------------------------  Unable to obtain 2/2 clinical condition  >>> <<<    VITALS/PHYSICAL EXAM  --------------------------------------------------------------------------------  T(C): 36.7 (06-27-21 @ 06:50), Max: 36.7 (06-26-21 @ 16:00)  HR: 69 (06-27-21 @ 06:50) (65 - 78)  BP: 145/64 (06-27-21 @ 06:50) (125/56 - 160/70)  RR: 17 (06-27-21 @ 06:50) (13 - 20)  SpO2: 96% (06-27-21 @ 06:50) (96% - 100%)  Wt(kg): --        06-26-21 @ 07:01  -  06-27-21 @ 07:00  --------------------------------------------------------  IN: 390 mL / OUT: 1450 mL / NET: -1060 mL      Physical Exam:    	Gen: NAD  	HEENT: Anicteric, + NGT  	Pulm: CTA B/L  	CV: S1S2  	Abd: Soft, +BS   	MSK: No LE edema B/L  	Neuro: Awake  	Skin: Warm and dry        LABS/STUDIES  --------------------------------------------------------------------------------              10.2   7.98  >-----------<  149      [06-27-21 @ 06:11]              32.1     145  |  95  |  90  ----------------------------<  312      [06-27-21 @ 06:11]  2.6   |  38  |  2.46        Ca     9.4     [06-27-21 @ 06:11]      Mg     2.4     [06-27-21 @ 06:11]      Phos  3.7     [06-27-21 @ 06:11]    TPro  6.5  /  Alb  2.8  /  TBili  0.7  /  DBili  x   /  AST  49  /  ALT  215  /  AlkPhos  183  [06-26-21 @ 00:54]    PT/INR: PT 15.3 , INR 1.36       [06-26-21 @ 00:54]  PTT: 68.3       [06-27-21 @ 06:11]      Creatinine Trend:  SCr 2.46 [06-27 @ 06:11]  SCr 2.91 [06-26 @ 00:54]  SCr 3.04 [06-25 @ 00:34]  SCr 2.97 [06-24 @ 01:11]  SCr 2.81 [06-23 @ 02:21]    Urinalysis - [06-20-21 @ 16:48]      Color Yellow / Appearance Clear / SG 1.032 / pH 6.5      Gluc >= 1000 mg/dL / Ketone Trace  / Bili Negative / Urobili <2 mg/dL       Blood Moderate / Protein >600 mg/dL / Leuk Est Negative / Nitrite Negative      RBC 52 / WBC 9 / Hyaline 1 / Gran  / Sq Epi  / Non Sq Epi 8 / Bacteria Few    Urine Creatinine 7      [06-21-21 @ 14:43]  Urine Protein 32      [06-21-21 @ 14:43]

## 2021-06-27 NOTE — RAPID RESPONSE TEAM SUMMARY - NSSITUATIONBACKGROUNDRRT_GEN_ALL_CORE
85M transferred out of MICU s/p extubation and resolution of DKA. Pt had received full dose of insulin NPH 11u as he was on tube feeds. Pt self-discontinued [place] tube, which was replaced but pt became lethargic so RRT called. FS 46, given 1 amp of D50 with improvement of FS to 271. No need to continue D5 at this time, but if FS continues to drop then can start D10. Despite  pt was not at baseline mental status; ABG drawn and if hypercapnic will resume BiPAP PRN. Put on NRB but will wean as tolerated.

## 2021-06-27 NOTE — PROVIDER CONTACT NOTE (HYPOGLYCEMIA EVENT) - NS PROVIDER CONTACT ASSESS-HYPO
patient restless, continuing to pull at lines and tubes in the bed
patient very lethargic, change in mental status - RRT called

## 2021-06-27 NOTE — SWALLOW BEDSIDE ASSESSMENT ADULT - SWALLOW EVAL: DIAGNOSIS
Patient presents with oropharyngeal Dysphagia marked by adequate stripping of bolus from utensil, reduced oral containment with labial spillage for thin liquids, extended mastication for mechanical soft solids, reduced bolus manipulation, and suspected posterior loss for thin liquids. There was laryngeal elevation upon digital palpation with suspected delayed initiation of pharyngeal swallow. Weak throat clear subsequent deglutition of thin liquids suggestive of laryngeal penetration/aspiration. No overt s/s of laryngeal penetration/aspiration for puree consistency, mechanical soft solids, honey thick liquids and nectar thick liquids.

## 2021-06-27 NOTE — SWALLOW BEDSIDE ASSESSMENT ADULT - ADDITIONAL RECOMMENDATIONS
1. Monitor for PO tolerance/intake   2. Reconsult this service as medical status improves to assess for possible diet advancement

## 2021-06-28 LAB
ALBUMIN SERPL ELPH-MCNC: 3.1 G/DL — LOW (ref 3.3–5)
ALP SERPL-CCNC: 256 U/L — HIGH (ref 40–120)
ALT FLD-CCNC: 116 U/L — HIGH (ref 4–41)
ANION GAP SERPL CALC-SCNC: 17 MMOL/L — HIGH (ref 7–14)
APTT BLD: 80.7 SEC — HIGH (ref 27–36.3)
AST SERPL-CCNC: 76 U/L — HIGH (ref 4–40)
BILIRUB SERPL-MCNC: 0.6 MG/DL — SIGNIFICANT CHANGE UP (ref 0.2–1.2)
BUN SERPL-MCNC: 87 MG/DL — HIGH (ref 7–23)
CALCIUM SERPL-MCNC: 9.8 MG/DL — SIGNIFICANT CHANGE UP (ref 8.4–10.5)
CHLORIDE SERPL-SCNC: 101 MMOL/L — SIGNIFICANT CHANGE UP (ref 98–107)
CO2 SERPL-SCNC: 31 MMOL/L — SIGNIFICANT CHANGE UP (ref 22–31)
CREAT SERPL-MCNC: 2.24 MG/DL — HIGH (ref 0.5–1.3)
GLUCOSE BLDC GLUCOMTR-MCNC: 78 MG/DL — SIGNIFICANT CHANGE UP (ref 70–99)
GLUCOSE BLDC GLUCOMTR-MCNC: 88 MG/DL — SIGNIFICANT CHANGE UP (ref 70–99)
GLUCOSE SERPL-MCNC: 70 MG/DL — SIGNIFICANT CHANGE UP (ref 70–99)
HCT VFR BLD CALC: 36 % — LOW (ref 39–50)
HGB BLD-MCNC: 11.1 G/DL — LOW (ref 13–17)
MAGNESIUM SERPL-MCNC: 2.6 MG/DL — SIGNIFICANT CHANGE UP (ref 1.6–2.6)
MCHC RBC-ENTMCNC: 29.1 PG — SIGNIFICANT CHANGE UP (ref 27–34)
MCHC RBC-ENTMCNC: 30.8 GM/DL — LOW (ref 32–36)
MCV RBC AUTO: 94.5 FL — SIGNIFICANT CHANGE UP (ref 80–100)
NRBC # BLD: 0 /100 WBCS — SIGNIFICANT CHANGE UP
NRBC # FLD: 0 K/UL — SIGNIFICANT CHANGE UP
PLATELET # BLD AUTO: 169 K/UL — SIGNIFICANT CHANGE UP (ref 150–400)
POTASSIUM SERPL-MCNC: 3.3 MMOL/L — LOW (ref 3.5–5.3)
POTASSIUM SERPL-SCNC: 3.3 MMOL/L — LOW (ref 3.5–5.3)
PROT SERPL-MCNC: 7.2 G/DL — SIGNIFICANT CHANGE UP (ref 6–8.3)
RBC # BLD: 3.81 M/UL — LOW (ref 4.2–5.8)
RBC # FLD: 14.1 % — SIGNIFICANT CHANGE UP (ref 10.3–14.5)
SODIUM SERPL-SCNC: 149 MMOL/L — HIGH (ref 135–145)
WBC # BLD: 9.11 K/UL — SIGNIFICANT CHANGE UP (ref 3.8–10.5)
WBC # FLD AUTO: 9.11 K/UL — SIGNIFICANT CHANGE UP (ref 3.8–10.5)

## 2021-06-28 PROCEDURE — 99232 SBSQ HOSP IP/OBS MODERATE 35: CPT

## 2021-06-28 PROCEDURE — 99232 SBSQ HOSP IP/OBS MODERATE 35: CPT | Mod: GC

## 2021-06-28 PROCEDURE — 99233 SBSQ HOSP IP/OBS HIGH 50: CPT

## 2021-06-28 RX ORDER — METOPROLOL TARTRATE 50 MG
12.5 TABLET ORAL ONCE
Refills: 0 | Status: COMPLETED | OUTPATIENT
Start: 2021-06-28 | End: 2021-06-28

## 2021-06-28 RX ORDER — METOPROLOL TARTRATE 50 MG
25 TABLET ORAL THREE TIMES A DAY
Refills: 0 | Status: DISCONTINUED | OUTPATIENT
Start: 2021-06-28 | End: 2021-06-30

## 2021-06-28 RX ORDER — SODIUM CHLORIDE 9 MG/ML
500 INJECTION, SOLUTION INTRAVENOUS
Refills: 0 | Status: DISCONTINUED | OUTPATIENT
Start: 2021-06-28 | End: 2021-06-28

## 2021-06-28 RX ORDER — POTASSIUM CHLORIDE 20 MEQ
40 PACKET (EA) ORAL ONCE
Refills: 0 | Status: DISCONTINUED | OUTPATIENT
Start: 2021-06-28 | End: 2021-06-28

## 2021-06-28 RX ORDER — POTASSIUM CHLORIDE 20 MEQ
40 PACKET (EA) ORAL ONCE
Refills: 0 | Status: DISCONTINUED | OUTPATIENT
Start: 2021-06-28 | End: 2021-06-30

## 2021-06-28 RX ADMIN — Medication 81 MILLIGRAM(S): at 11:37

## 2021-06-28 RX ADMIN — CHLORHEXIDINE GLUCONATE 1 APPLICATION(S): 213 SOLUTION TOPICAL at 11:40

## 2021-06-28 RX ADMIN — SODIUM CHLORIDE 50 MILLILITER(S): 9 INJECTION, SOLUTION INTRAVENOUS at 15:52

## 2021-06-28 RX ADMIN — Medication 25 MILLIGRAM(S): at 21:40

## 2021-06-28 RX ADMIN — Medication 12.5 MILLIGRAM(S): at 05:55

## 2021-06-28 RX ADMIN — Medication 25 MILLIGRAM(S): at 13:59

## 2021-06-28 RX ADMIN — HEPARIN SODIUM 900 UNIT(S)/HR: 5000 INJECTION INTRAVENOUS; SUBCUTANEOUS at 08:40

## 2021-06-28 RX ADMIN — CLOPIDOGREL BISULFATE 75 MILLIGRAM(S): 75 TABLET, FILM COATED ORAL at 11:37

## 2021-06-28 RX ADMIN — Medication 1: at 17:52

## 2021-06-28 RX ADMIN — Medication 12.5 MILLIGRAM(S): at 11:36

## 2021-06-28 RX ADMIN — SODIUM CHLORIDE 75 MILLILITER(S): 9 INJECTION INTRAMUSCULAR; INTRAVENOUS; SUBCUTANEOUS at 08:40

## 2021-06-28 NOTE — PROGRESS NOTE ADULT - SUBJECTIVE AND OBJECTIVE BOX
Patient seen and examined at bedside.    Overnight Events:   Transferred out of the unit. Extubated. Weekend course c/b encephelopathy. pt is cp free      REVIEW OF SYSTEMS:  [x] Unable to assess ROS due to MS    Current Meds:  aspirin  chewable 81 milliGRAM(s) Oral daily  chlorhexidine 2% Cloths 1 Application(s) Topical daily  clopidogrel Tablet 75 milliGRAM(s) Oral daily  dextrose 40% Gel 15 Gram(s) Oral once  dextrose 5%. 1000 milliLiter(s) IV Continuous <Continuous>  dextrose 5%. 1000 milliLiter(s) IV Continuous <Continuous>  dextrose 5%. 500 milliLiter(s) IV Continuous <Continuous>  dextrose 50% Injectable 25 Gram(s) IV Push once  dextrose 50% Injectable 12.5 Gram(s) IV Push once  dextrose 50% Injectable 25 Gram(s) IV Push once  glucagon  Injectable 1 milliGRAM(s) IntraMuscular once  heparin   Injectable 4500 Unit(s) IV Push every 6 hours PRN  heparin   Injectable 2000 Unit(s) IV Push every 6 hours PRN  heparin  Infusion. 900 Unit(s)/Hr IV Continuous <Continuous>  insulin lispro (ADMELOG) corrective regimen sliding scale   SubCutaneous every 6 hours  insulin NPH human recombinant 10 Unit(s) SubCutaneous every 6 hours  metoprolol tartrate 12.5 milliGRAM(s) Oral three times a day  sodium chloride 0.9%. 1000 milliLiter(s) IV Continuous <Continuous>      PAST MEDICAL & SURGICAL HISTORY:  HTN (hypertension)    Diabetes, type I        Vitals:  T(F): 98 (06-28), Max: 98 (06-28)  HR: 75 (06-28) (75 - 165)  BP: 140/84 (06-28) (97/61 - 167/85)  RR: 17 (06-28)  SpO2: 100% (06-28)  I&O's Summary    26 Jun 2021 07:01  -  27 Jun 2021 07:00  --------------------------------------------------------  IN: 390 mL / OUT: 1450 mL / NET: -1060 mL        Physical Exam:    NAD, +NGT   No JVD   CTABL anteriorly   RRR, no MRG   Soft NTND   No edema   +mittens                       10.2   7.98  )-----------( 149      ( 27 Jun 2021 06:11 )             32.1     06-27    147<H>  |  97<L>  |  90<H>  ----------------------------<  234<H>  3.8   |  34<H>  |  2.37<H>    Ca    10.1      27 Jun 2021 16:38  Phos  3.7     06-27  Mg     2.4     06-27      PTT - ( 27 Jun 2021 06:11 )  PTT:68.3 sec              Cardiovascular Testings:       Interpretation of Telemetry: SR no events over night

## 2021-06-28 NOTE — PROGRESS NOTE ADULT - ASSESSMENT
84 yo M with PMH of CAD s/p remote hx of PCI (unclear anatomy), uncontrolled T2DM, and HLD p/w hypoxia/AMS admitted to MICU for DKA on insulin gtt and likely late presentation MI. Intubated for AMS, and MICU course c/b ARF and shock liver. Pt was weaned off pressor and extubated in the MICU and transferred to the telemetry floor over the weekend. HC c/b encephalopathy.     TTE 6/2021  CONCLUSIONS:  1. Mitral annular calcification, otherwise normal mitral  valve. Minimal mitral regurgitation.  2. Calcified trileaflet aortic valve with normal opening.  Mild aortic regurgitation.  3. Normal left ventricular internal dimensions and wall  thicknesses.  4. Severe global left ventricular systolic dysfunction.  Endocardialvisualization enhanced with intravenous  injection of echo contrast (Definity).  5. Mild diastolic dysfunction (Stage I).  6. The right ventricle is not well visualized; grossly  normal right ventricular systolic function.  7. Estimated right ventricular systolic pressure equals 52  mm Hg, assuming right atrial pressure equals 10 mm Hg,  consistent with moderate pulmonary hypertension.  8. Left pleural effusion.    -would increase to metoprolol tartrate 25 mg TID   -cont DAPT  with ASA and Plavix  -pt should be on atorvastatin 80 mg QHS   -previously on bumex gtt, monitor UO, may need to be have maintenance diuretic if UO subsides   -not a candidate for invasive intervention i.e LINDSEY, multiple electrolyte derangements, contrast allergy at this time, recommend viability study as initial ischemic work up     Thank you for allowing us to participate in the care of your patient. If you have any questions or concerns please do not hesitate to contact us 24/7.     Reese Sam MD x [phone]  Cardiology Fellow, [facility]  All Cardiology service information can be found 24/7 on [email or web address], password: [email or web address]

## 2021-06-28 NOTE — PROGRESS NOTE ADULT - SUBJECTIVE AND OBJECTIVE BOX
VASCULAR SURGERY PROGRESS NOTE    INTERVAL EVENTS: Extubated, discharged from MICU, RR called for hypoglycemia. Not yet a candidate for cardiac cath due to LINDSEY and electrolyte derangements. No change in small lateral malleolus wound, no leg pain      OBJECTIVE:    Vital Signs Last 24 Hrs  T(C): 36.7 (28 Jun 2021 06:30), Max: 36.7 (28 Jun 2021 06:30)  T(F): 98 (28 Jun 2021 06:30), Max: 98 (28 Jun 2021 06:30)  HR: 75 (28 Jun 2021 06:30) (75 - 165)  BP: 140/84 (28 Jun 2021 06:30) (97/61 - 145/84)  BP(mean): --  RR: 17 (28 Jun 2021 06:30) (17 - 18)  SpO2: 100% (28 Jun 2021 06:30) (96% - 100%)    General Appearance: Resting comfortably, no acute distress  Chest: non-labored breathing, no respiratory distress  CV: Pulse regular presently  Abdomen: Soft, non-tender, non-distended  Extremities: skin changes bilaterally, wounds on lateral legs shallow, no drainage, no evidence of infection  Vascular: L Palpable femoral pulse, palpable bypass pulse, dopplerable DP R palpable femoral, dopplerable DP and PT    I&O's Summary    I&O's Detail        LABS:                        11.1   9.11  )-----------( 169      ( 28 Jun 2021 08:03 )             36.0     06-27    147<H>  |  97<L>  |  90<H>  ----------------------------<  234<H>  3.8   |  34<H>  |  2.37<H>    Ca    10.1      27 Jun 2021 16:38  Phos  3.7     06-27  Mg     2.4     06-27      PTT - ( 28 Jun 2021 08:03 )  PTT:80.7 sec      RADIOLOGY & ADDITIONAL STUDIES:

## 2021-06-28 NOTE — PROGRESS NOTE ADULT - SUBJECTIVE AND OBJECTIVE BOX
Chief Complaint: DM2    History: tolerating enteral feeds Glucerna 1.5 @ 30 cc/hour  Glucose borderline low today 70s-80s, no overt hypoglycemia  NPH held  to be initiated on D5w per primary team    MEDICATIONS  (STANDING):  aspirin  chewable 81 milliGRAM(s) Oral daily  chlorhexidine 2% Cloths 1 Application(s) Topical daily  clopidogrel Tablet 75 milliGRAM(s) Oral daily  dextrose 40% Gel 15 Gram(s) Oral once  dextrose 5%. 1000 milliLiter(s) (50 mL/Hr) IV Continuous <Continuous>  dextrose 5%. 1000 milliLiter(s) (100 mL/Hr) IV Continuous <Continuous>  dextrose 5%. 500 milliLiter(s) (50 mL/Hr) IV Continuous <Continuous>  dextrose 5%. 500 milliLiter(s) (50 mL/Hr) IV Continuous <Continuous>  dextrose 50% Injectable 25 Gram(s) IV Push once  dextrose 50% Injectable 12.5 Gram(s) IV Push once  dextrose 50% Injectable 25 Gram(s) IV Push once  glucagon  Injectable 1 milliGRAM(s) IntraMuscular once  heparin  Infusion. 900 Unit(s)/Hr (9 mL/Hr) IV Continuous <Continuous>  insulin lispro (ADMELOG) corrective regimen sliding scale   SubCutaneous every 6 hours  metoprolol tartrate 25 milliGRAM(s) Oral three times a day  potassium chloride   Powder 40 milliEquivalent(s) Oral once    MEDICATIONS  (PRN):  heparin   Injectable 4500 Unit(s) IV Push every 6 hours PRN For aPTT less than 40  heparin   Injectable 2000 Unit(s) IV Push every 6 hours PRN For aPTT between 40 - 57      Allergies    dyes,iv dyes (Unknown)  iodinated radiocontrast agents (Hives)    Intolerances      Review of Systems:  UNABLE TO OBTAIN    PHYSICAL EXAM:  VITALS: T(C): 36.7 (06-28-21 @ 13:45)  T(F): 98.1 (06-28-21 @ 13:45), Max: 98.2 (06-28-21 @ 11:35)  HR: 87 (06-28-21 @ 13:45) (75 - 155)  BP: 138/88 (06-28-21 @ 13:45) (97/61 - 145/84)  RR:  (17 - 18)  SpO2:  (100% - 100%)  Wt(kg): --  GENERAL: NAD, appears comfortable  EYES: No proptosis, no lid lag, anicteric  HEENT:  Atraumatic, Normocephalic, moist mucous membranes  RESPIRATORY: nonlabored respirations, no wheezing  GI: NGT with enteral feeds infusing  PSYCH: unable to assess    CAPILLARY BLOOD GLUCOSE      POCT Blood Glucose.: 87 mg/dL (28 Jun 2021 12:51)  POCT Blood Glucose.: 88 mg/dL (28 Jun 2021 08:38)  POCT Blood Glucose.: 78 mg/dL (28 Jun 2021 05:05)  POCT Blood Glucose.: 200 mg/dL (27 Jun 2021 23:39)  POCT Blood Glucose.: 239 mg/dL (27 Jun 2021 18:03)      06-28    149<H>  |  101  |  87<H>  ----------------------------<  70  3.3<L>   |  31  |  2.24<H>    EGFR if : 30<L>  EGFR if non : 26<L>    Ca    9.8      06-28  Mg     2.6     06-28  Phos  3.7     06-27    TPro  7.2  /  Alb  3.1<L>  /  TBili  0.6  /  DBili  x   /  AST  76<H>  /  ALT  116<H>  /  AlkPhos  256<H>  06-28      A1C with Estimated Average Glucose Result: 8.0 % (06-24-21 @ 01:11)      Thyroid Function Tests:

## 2021-06-28 NOTE — PROGRESS NOTE ADULT - SUBJECTIVE AND OBJECTIVE BOX
Overnight Events: NAEON    Telemetry: NSR 70s    Review Of Systems: No chest pain, shortness of breath, or palpitations  CONSTITUTIONAL: no fevers or chills  EYES/ENT: No visual changes;  No vertigo or throat pain   NECK: No pain or stiffness  RESPIRATORY: No cough, wheezing. No shortness of breath  CARDIOVASCULAR: No chest pain or palpitations  GASTROINTESTINAL: No abdominal or epigastric pain. No nausea, vomiting. No diarrhea. No melena.  GENITOURINARY: No dysuria, frequency or hematuria  NEUROLOGICAL: No numbness or weakness  SKIN: No itching, burning, rashes, or lesions   All other review of systems is negative unless indicated above.     Current Meds:  aspirin  chewable 81 milliGRAM(s) Oral daily  chlorhexidine 2% Cloths 1 Application(s) Topical daily  clopidogrel Tablet 75 milliGRAM(s) Oral daily  dextrose 40% Gel 15 Gram(s) Oral once  dextrose 5%. 1000 milliLiter(s) IV Continuous <Continuous>  dextrose 5%. 1000 milliLiter(s) IV Continuous <Continuous>  dextrose 5%. 500 milliLiter(s) IV Continuous <Continuous>  dextrose 50% Injectable 25 Gram(s) IV Push once  dextrose 50% Injectable 12.5 Gram(s) IV Push once  dextrose 50% Injectable 25 Gram(s) IV Push once  glucagon  Injectable 1 milliGRAM(s) IntraMuscular once  heparin   Injectable 4500 Unit(s) IV Push every 6 hours PRN  heparin   Injectable 2000 Unit(s) IV Push every 6 hours PRN  heparin  Infusion. 900 Unit(s)/Hr IV Continuous <Continuous>  insulin lispro (ADMELOG) corrective regimen sliding scale   SubCutaneous every 6 hours  insulin NPH human recombinant 10 Unit(s) SubCutaneous every 6 hours  metoprolol tartrate 25 milliGRAM(s) Oral three times a day  metoprolol tartrate 12.5 milliGRAM(s) Oral once      Vitals:  T(F): 98 (06-28), Max: 98 (06-28)  HR: 75 (06-28) (75 - 165)  BP: 140/84 (06-28) (97/61 - 145/84)  RR: 17 (06-28)  SpO2: 100% (06-28)  I&O's Summary      Physical Exam:  Appearance: No acute distress; frail appearing man, NGT in place  Cardiovascular: RRR, S1, S2, no murmurs, rubs, or gallops; no JVD  Respiratory: Clear to auscultation bilaterally  Gastrointestinal: soft, non-tender, non-distended with normal bowel sounds  Extremities: No edema  Musculoskeletal: No clubbing; no joint deformity   Neurologic: Non-focal  Lymphatic: No lymphadenopathy  Psychiatry: AAOx3, mood & affect appropriate  Skin: No rashes, ecchymoses, or cyanosis                          11.1   9.11  )-----------( 169      ( 28 Jun 2021 08:03 )             36.0     06-28    149<H>  |  101  |  87<H>  ----------------------------<  70  3.3<L>   |  31  |  2.24<H>    Ca    9.8      28 Jun 2021 08:03  Phos  3.7     06-27  Mg     2.6     06-28    TPro  7.2  /  Alb  3.1<L>  /  TBili  0.6  /  DBili  x   /  AST  76<H>  /  ALT  116<H>  /  AlkPhos  256<H>  06-28    PTT - ( 28 Jun 2021 08:03 )  PTT:80.7 sec  CARDIAC MARKERS ( 22 Jun 2021 04:36 )  5170 ng/L / x     / x     / x     / x     / x

## 2021-06-28 NOTE — PROGRESS NOTE ADULT - ASSESSMENT
Assessment and Recommendations:  86 yo M with PMH of CAD s/p remote hx of PCI (unclear anatomy), uncontrolled T2DM, and HLD p/w hypoxia/AMS admitted to MICU for DKA on insulin gtt and likely late presentation MI. Intubated for AMS, and MICU course c/b ARF and shock liver. Hospital course further c/b afib with RVR, now in sinus rhythm.    #atrial fibrillation  - s/p amiodarone 150mg IV x 1  - TTE with severe global LV systolic dysfunction  - telemetry currently sinus rhythm  - agree with increasing metoprolol tartrate to 25 q8h  - on heparin gtt, eventual transition to DOAC (will need to discontinue ASA 81 if this happens)  - rest of management per general cardiology team      Bruce Rodriguez MD  Cardiology Fellow  All Cardiology service information can be found 24/7 on [email or web address], password: [email or web address]    Patient seen and examined at bedside. Note is preliminary until signed by attending.

## 2021-06-28 NOTE — PROGRESS NOTE ADULT - ASSESSMENT
85 year old male with extensive PMH presenting with AMS and MICU course complicated by poor mental status and not waking up, duplex of bypass showing severe stenosis    Plan:  - Patient will need LLE angiogram for stenosis of previous bypass  - Pending cardiac work up (likely viability test for ischemia work up)  - Continue local wound care of LE wounds    Vascular Surgery [id number]

## 2021-06-28 NOTE — PROGRESS NOTE ADULT - ASSESSMENT
85 year old male with hx of diabetes who comes in for AMS, found to be hypoxic and in DKA with additional oliguric renal failure, intubated and sedated 6/20 in the ED due to worsening mental status and inability to protect airway and now with STEMI on medical management. Now extubated and transferred to the floor.      #AMS: likely metabolic encephalopathy iso hypercapnia and renal failure, now improved      #STEMI in leads V1/V2 w/rising troponin and CKMB  -s/p ASA/plavix load; c/w ASA, Plavix; c/w hep gtt for 48h  -hold off statin d/t transaminitis  -c/f acute systolic chf, given pleural effusions and BNP 50k   -echo LVEF 23%, severe global LV dysfxn, stage I diastolic dysfxn  -diuresed with bumex, now off d/t contraction alkalosis/lindsey, off bumex since 6/22  -no plans for cardiac cath until renal fxn improves  -f/u cardiology    #L femoral-deep peroneal artery bypass (on prelim [facility] Duplex arterial)  - w/stenosis of bypass tract w/o occlusion; likely chronic given discoloration and skin changes noted on LE; LLE perfused  -leg arterial doppler: No flow visualized in the right posterior tibial and  peroneal arteries.  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 Left runoff vessels not well visualized.  - Consulted vascular and talked to resident on the phone, advised to continue heparin infusion until stable to get an angiogram of LE. Cards clearance      Transaminitis , likely shock liver given acute rise in LFTs from WNL to 3000/1600 iso hypoperfusion from MI vs. less likely congestive hepatopathy  - LFTs trending down  - continue to monitor      Renal:     #LINDSEY (no previous labs to assess baseline) c/b oliguric renal failure, likely ATN 2/2 hypoperfusion iso MI vs. hypovolemia prior to MI  - Cr improving likely iso ATN   - contraction alkalosis with hypokalemia  -bumex held, replete K po/iv, repeat BMP this afternoon  -renal ordered NS 75 ml/h x12h, knows her EF is 23%  -monitor fluid status, avoid fluid overload  - trend Lytes, keep K>4 and Mg>2      #DKA, resolved  - AG present but w/o BHB or acidosis; AG likely iso renal failure  - pt  Hypoglycemia overnight, will dc NPH, trend FS, ISS and dysphagia diet. c/w D5 gtt    ID:  SIRS w/hypothermia, tachycardia, leukocytosis, lactate w/o obvious source of infection; hypothermia resolved, likely was iso hypoperfusion from STEMI  - RLE w/crusted lesions without associated erythema, warmth, or exudates  - BCx pending NGTD, started empirically on vancomycin/zosyn/doxy (6/20/21- 6/21/21)  - off abx, monitor temp curve    LE lesions:  + LE vasculature stenosis: Vasc planning possible angio

## 2021-06-28 NOTE — PROGRESS NOTE ADULT - SUBJECTIVE AND OBJECTIVE BOX
[facility] Division of Hospital Medicine  Lucien Pena MD  Pager [phone]      Patient is a 85y old  Male who presents with a chief complaint of AMS, Hypoxia (28 Jun 2021 10:45)      SUBJECTIVE / OVERNIGHT EVENTS: Denies SOB or CP    MEDICATIONS  (STANDING):  aspirin  chewable 81 milliGRAM(s) Oral daily  chlorhexidine 2% Cloths 1 Application(s) Topical daily  clopidogrel Tablet 75 milliGRAM(s) Oral daily  dextrose 40% Gel 15 Gram(s) Oral once  dextrose 5%. 1000 milliLiter(s) (50 mL/Hr) IV Continuous <Continuous>  dextrose 5%. 1000 milliLiter(s) (100 mL/Hr) IV Continuous <Continuous>  dextrose 5%. 500 milliLiter(s) (50 mL/Hr) IV Continuous <Continuous>  dextrose 50% Injectable 25 Gram(s) IV Push once  dextrose 50% Injectable 12.5 Gram(s) IV Push once  dextrose 50% Injectable 25 Gram(s) IV Push once  glucagon  Injectable 1 milliGRAM(s) IntraMuscular once  heparin  Infusion. 900 Unit(s)/Hr (9 mL/Hr) IV Continuous <Continuous>  insulin lispro (ADMELOG) corrective regimen sliding scale   SubCutaneous every 6 hours  insulin NPH human recombinant 10 Unit(s) SubCutaneous every 6 hours  metoprolol tartrate 25 milliGRAM(s) Oral three times a day  potassium chloride   Powder 40 milliEquivalent(s) Oral once    MEDICATIONS  (PRN):  heparin   Injectable 4500 Unit(s) IV Push every 6 hours PRN For aPTT less than 40  heparin   Injectable 2000 Unit(s) IV Push every 6 hours PRN For aPTT between 40 - 57      CAPILLARY BLOOD GLUCOSE      POCT Blood Glucose.: 87 mg/dL (28 Jun 2021 12:51)  POCT Blood Glucose.: 88 mg/dL (28 Jun 2021 08:38)  POCT Blood Glucose.: 78 mg/dL (28 Jun 2021 05:05)  POCT Blood Glucose.: 200 mg/dL (27 Jun 2021 23:39)  POCT Blood Glucose.: 239 mg/dL (27 Jun 2021 18:03)    I&O's Summary      PHYSICAL EXAM:  Vital Signs Last 24 Hrs  T(C): 36.8 (28 Jun 2021 11:35), Max: 36.8 (28 Jun 2021 11:35)  T(F): 98.2 (28 Jun 2021 11:35), Max: 98.2 (28 Jun 2021 11:35)  HR: 76 (28 Jun 2021 11:35) (75 - 155)  BP: 136/89 (28 Jun 2021 11:35) (97/61 - 145/84)  BP(mean): --  RR: 18 (28 Jun 2021 11:35) (17 - 18)  SpO2: 100% (28 Jun 2021 11:35) (100% - 100%)  CONSTITUTIONAL: NAD  EYES: conjunctiva and sclera clear  ENMT: mmm  NECK: Supple,  RESPIRATORY: Normal respiratory effort; lungs are clear to auscultation bilaterally  CARDIOVASCULAR: Regular rate and rhythm, + S1 and S2  ABDOMEN: Nontender to palpation, normoactive bowel sounds, no rebound/guarding  MUSCULOSKELETAL:  no clubbing or cyanosis of digits;   PSYCH: A+O x 1-2  NEUROLOGY: no gross deficits   SKIN: No rashes;     LABS:                        11.1   9.11  )-----------( 169      ( 28 Jun 2021 08:03 )             36.0     06-28    149<H>  |  101  |  87<H>  ----------------------------<  70  3.3<L>   |  31  |  2.24<H>    Ca    9.8      28 Jun 2021 08:03  Phos  3.7     06-27  Mg     2.6     06-28    TPro  7.2  /  Alb  3.1<L>  /  TBili  0.6  /  DBili  x   /  AST  76<H>  /  ALT  116<H>  /  AlkPhos  256<H>  06-28    PTT - ( 28 Jun 2021 08:03 )  PTT:80.7 sec

## 2021-06-28 NOTE — PROGRESS NOTE ADULT - ASSESSMENT
85 year old male with PMHx of DM2 and HTN who presented with AMS, found to be in DKA s/p insulin gtt. Patient was also noted to be hypoxic on presentation requiring intubation. Patient subsequently started on tube feeds. Patient is now currently extubated, but remains on tube feeds via NGT. Patient noted to have episode of hypoglycemia while on tube feeds. Endocrine Team consulted for further management.     Problem 1: DM2 c/b hypoglycemia  This AM borderline low glucose x 2  NPH was given yesterday but held today  Patient to receive D5w and on continuous feeds  Monitor glucose trend - if glucose increases over 150 mg/dl please stop D5w.  May consider resuming lower dose NPH tomorrow based on trend.  - Continue Low dose correctional scale q6hrs  - Monitor FS q6hrs (Inpatient Goal 100-180 mg/dl)    Discharge Plan:   - Discharge recommendations pending inpatient course. Patient currently on tube feeds.   - Will need to verify with family if patient follows with an outpatient Endocrinologist.    Problem 2: Protein calorie malnutrition  - Patient currently on continuous tube feeds    Problem 3: HTN  - Currently at goal (Goal <130/80)  - Management per primary team  - Continue Coreg 3.1245 mg BID    Clement Klein MD  Division of Endocrinology  Pager: [phone]    If after 6PM or before 9AM, or on weekends/holidays, please call endocrine answering service for assistance ([phone]).  For nonurgent matters email [email or web address] for assistance.

## 2021-06-28 NOTE — PROGRESS NOTE ADULT - PROBLEM SELECTOR PLAN 1
Pt with hemodynamically mediated LINDSEY/ ATN in the setting of SIRS, STEMI, DKA and medication use (Telmisartan). No previous labs for review. SCr Peaked to 3.04 on 6/25. Today Cr improved to 2.24mg/dl.  Initial UA with proteinuria, hematuria, pyuria. Spot urine TP/CR 4.6.  Continues to appear hypovolemic on exam.  C/w Holding Telmisartan. Agree with continuing fluids for now.  Please notify nephrology if patient is planned for iodanted contrast - based studies. Monitor labs and urine output. Avoid NSAIDs, ACEI/ARBS for now. Dose medications as per eGFR

## 2021-06-28 NOTE — PROGRESS NOTE ADULT - SUBJECTIVE AND OBJECTIVE BOX
[facility] Division of Kidney Diseases & Hypertension  FOLLOW UP NOTE  --------------------------------------------------------------------------------  HPI: 85 year old male with PMHx HTN, DM  was brought in by EMS for lethargic and respiratory failure. Per ED note and collateral info from son, patient not feeling well since yesterday and was  Pt presented to the ED and was reportedly hypoxic to 60s and lethargic. Mental status waxing and waning, patient was placed on BIPAP then subsequently intubated for airway protection.  On admission, labs notable for metabolic acidosis, elevated lactate (pH: 7.02, serum CO2 low at 8 with lactate 11.7) and hyperglycemia.  No previous labs for review on [facility]. On admission, pt with elevated SCr of 2.09 (6/10). Pt was found to be hypoxic and in DKA with additional oliguric renal failure, intubated and sedated 6/20 then extubated pff pressors and transferred to general medical floor.    Pt seen & examined at bedside this morning.  He is extubated in restraints with NGT.  Able to nod yes / no.  No chest pain no shorntess of breath nods "yes to prior kidney problem."    PAST HISTORY  --------------------------------------------------------------------------------  No significant changes to PMH, PSH, FHx, SHx, unless otherwise noted    ALLERGIES & MEDICATIONS  --------------------------------------------------------------------------------  Allergies    dyes,iv dyes (Unknown)  iodinated radiocontrast agents (Hives)    Intolerances      Standing Inpatient Medications  aspirin  chewable 81 milliGRAM(s) Oral daily  chlorhexidine 2% Cloths 1 Application(s) Topical daily  clopidogrel Tablet 75 milliGRAM(s) Oral daily  dextrose 40% Gel 15 Gram(s) Oral once  dextrose 5%. 1000 milliLiter(s) IV Continuous <Continuous>  dextrose 5%. 1000 milliLiter(s) IV Continuous <Continuous>  dextrose 5%. 500 milliLiter(s) IV Continuous <Continuous>  dextrose 50% Injectable 25 Gram(s) IV Push once  dextrose 50% Injectable 12.5 Gram(s) IV Push once  dextrose 50% Injectable 25 Gram(s) IV Push once  glucagon  Injectable 1 milliGRAM(s) IntraMuscular once  heparin  Infusion. 900 Unit(s)/Hr IV Continuous <Continuous>  insulin lispro (ADMELOG) corrective regimen sliding scale   SubCutaneous every 6 hours  insulin NPH human recombinant 10 Unit(s) SubCutaneous every 6 hours  metoprolol tartrate 25 milliGRAM(s) Oral three times a day  metoprolol tartrate 12.5 milliGRAM(s) Oral once    PRN Inpatient Medications  heparin   Injectable 4500 Unit(s) IV Push every 6 hours PRN  heparin   Injectable 2000 Unit(s) IV Push every 6 hours PRN      REVIEW OF SYSTEMS  --------------------------------------------------------------------------------  Gen: no fever  Respiratory: no sob  CV: no cp  GI: no abdominal pain    VITALS/PHYSICAL EXAM  --------------------------------------------------------------------------------  T(C): 36.7 (06-28-21 @ 10:30), Max: 36.7 (06-28-21 @ 06:30)  HR: 86 (06-28-21 @ 10:30) (75 - 165)  BP: 138/86 (06-28-21 @ 10:30) (97/61 - 145/84)  ABP: --  ABP(mean): --  RR: 18 (06-28-21 @ 10:30) (17 - 18)  SpO2: 100% (06-28-21 @ 10:30) (96% - 100%)  CVP(mm Hg): --    Physical Exam:    	Gen: NAD  	HEENT: Anicteric, + NGT dry mm  	Pulm: CTA B/L  	CV: S1S2  	Abd: Soft, +BS   	MSK: No LE edema B/L  	Neuro: Awake  	Skin: Warm and dry    LABS/STUDIES  --------------------------------------------------------------------------------              11.1   9.11  >-----------<  169      [06-28-21 @ 08:03]              36.0     149  |  101  |  87  ----------------------------<  70      [06-28-21 @ 08:03]  3.3   |  31  |  2.24        Ca     9.8     [06-28-21 @ 08:03]      Mg     2.6     [06-28-21 @ 08:03]      Phos  3.7     [06-27-21 @ 06:11]    TPro  7.2  /  Alb  3.1  /  TBili  0.6  /  DBili  x   /  AST  76  /  ALT  116  /  AlkPhos  256  [06-28-21 @ 08:03]      PTT: 80.7       [06-28-21 @ 08:03]      Creatinine Trend:  SCr 2.24 [06-28 @ 08:03]  SCr 2.37 [06-27 @ 16:38]  SCr 2.46 [06-27 @ 06:11]  SCr 2.91 [06-26 @ 00:54]  SCr 3.04 [06-25 @ 00:34]      Urine Creatinine 7      [06-21-21 @ 14:43]  Urine Protein 32      [06-21-21 @ 14:43]

## 2021-06-29 DIAGNOSIS — E87.0 HYPEROSMOLALITY AND HYPERNATREMIA: ICD-10-CM

## 2021-06-29 LAB
ALBUMIN SERPL ELPH-MCNC: 2.9 G/DL — LOW (ref 3.3–5)
ALP SERPL-CCNC: 203 U/L — HIGH (ref 40–120)
ALT FLD-CCNC: 92 U/L — HIGH (ref 4–41)
ANION GAP SERPL CALC-SCNC: 16 MMOL/L — HIGH (ref 7–14)
APTT BLD: 123.1 SEC — CRITICAL HIGH (ref 27–36.3)
APTT BLD: 131.6 SEC — CRITICAL HIGH (ref 27–36.3)
APTT BLD: 82.9 SEC — HIGH (ref 27–36.3)
AST SERPL-CCNC: 58 U/L — HIGH (ref 4–40)
BILIRUB SERPL-MCNC: 0.7 MG/DL — SIGNIFICANT CHANGE UP (ref 0.2–1.2)
BUN SERPL-MCNC: 69 MG/DL — HIGH (ref 7–23)
CALCIUM SERPL-MCNC: 9.1 MG/DL — SIGNIFICANT CHANGE UP (ref 8.4–10.5)
CHLORIDE SERPL-SCNC: 106 MMOL/L — SIGNIFICANT CHANGE UP (ref 98–107)
CO2 SERPL-SCNC: 28 MMOL/L — SIGNIFICANT CHANGE UP (ref 22–31)
CREAT SERPL-MCNC: 1.94 MG/DL — HIGH (ref 0.5–1.3)
GLUCOSE SERPL-MCNC: 255 MG/DL — HIGH (ref 70–99)
HCT VFR BLD CALC: 38.9 % — LOW (ref 39–50)
HGB BLD-MCNC: 11.8 G/DL — LOW (ref 13–17)
MAGNESIUM SERPL-MCNC: 2.4 MG/DL — SIGNIFICANT CHANGE UP (ref 1.6–2.6)
MCHC RBC-ENTMCNC: 29.3 PG — SIGNIFICANT CHANGE UP (ref 27–34)
MCHC RBC-ENTMCNC: 30.3 GM/DL — LOW (ref 32–36)
MCV RBC AUTO: 96.5 FL — SIGNIFICANT CHANGE UP (ref 80–100)
NRBC # BLD: 0 /100 WBCS — SIGNIFICANT CHANGE UP
NRBC # FLD: 0 K/UL — SIGNIFICANT CHANGE UP
PHOSPHATE SERPL-MCNC: 2.6 MG/DL — SIGNIFICANT CHANGE UP (ref 2.5–4.5)
PLATELET # BLD AUTO: 167 K/UL — SIGNIFICANT CHANGE UP (ref 150–400)
POTASSIUM SERPL-MCNC: 3.5 MMOL/L — SIGNIFICANT CHANGE UP (ref 3.5–5.3)
POTASSIUM SERPL-SCNC: 3.5 MMOL/L — SIGNIFICANT CHANGE UP (ref 3.5–5.3)
PROT SERPL-MCNC: 6.6 G/DL — SIGNIFICANT CHANGE UP (ref 6–8.3)
RBC # BLD: 4.03 M/UL — LOW (ref 4.2–5.8)
RBC # FLD: 14 % — SIGNIFICANT CHANGE UP (ref 10.3–14.5)
SODIUM SERPL-SCNC: 150 MMOL/L — HIGH (ref 135–145)
WBC # BLD: 9.28 K/UL — SIGNIFICANT CHANGE UP (ref 3.8–10.5)
WBC # FLD AUTO: 9.28 K/UL — SIGNIFICANT CHANGE UP (ref 3.8–10.5)

## 2021-06-29 PROCEDURE — 99233 SBSQ HOSP IP/OBS HIGH 50: CPT

## 2021-06-29 PROCEDURE — 99232 SBSQ HOSP IP/OBS MODERATE 35: CPT | Mod: GC

## 2021-06-29 PROCEDURE — 99232 SBSQ HOSP IP/OBS MODERATE 35: CPT

## 2021-06-29 RX ORDER — ACETAMINOPHEN 500 MG
650 TABLET ORAL ONCE
Refills: 0 | Status: COMPLETED | OUTPATIENT
Start: 2021-06-29 | End: 2021-06-29

## 2021-06-29 RX ORDER — INSULIN GLARGINE 100 [IU]/ML
10 INJECTION, SOLUTION SUBCUTANEOUS ONCE
Refills: 0 | Status: COMPLETED | OUTPATIENT
Start: 2021-06-29 | End: 2021-06-29

## 2021-06-29 RX ORDER — INSULIN GLARGINE 100 [IU]/ML
10 INJECTION, SOLUTION SUBCUTANEOUS
Refills: 0 | Status: DISCONTINUED | OUTPATIENT
Start: 2021-06-30 | End: 2021-06-30

## 2021-06-29 RX ORDER — INSULIN LISPRO 100/ML
VIAL (ML) SUBCUTANEOUS AT BEDTIME
Refills: 0 | Status: DISCONTINUED | OUTPATIENT
Start: 2021-06-29 | End: 2021-06-30

## 2021-06-29 RX ORDER — ISOSORBIDE MONONITRATE 60 MG/1
60 TABLET, EXTENDED RELEASE ORAL DAILY
Refills: 0 | Status: DISCONTINUED | OUTPATIENT
Start: 2021-06-29 | End: 2021-06-30

## 2021-06-29 RX ORDER — INSULIN LISPRO 100/ML
VIAL (ML) SUBCUTANEOUS
Refills: 0 | Status: DISCONTINUED | OUTPATIENT
Start: 2021-06-29 | End: 2021-06-30

## 2021-06-29 RX ADMIN — ISOSORBIDE MONONITRATE 60 MILLIGRAM(S): 60 TABLET, EXTENDED RELEASE ORAL at 13:39

## 2021-06-29 RX ADMIN — HEPARIN SODIUM 0 UNIT(S)/HR: 5000 INJECTION INTRAVENOUS; SUBCUTANEOUS at 22:34

## 2021-06-29 RX ADMIN — Medication 81 MILLIGRAM(S): at 11:03

## 2021-06-29 RX ADMIN — Medication 1: at 18:21

## 2021-06-29 RX ADMIN — Medication 2: at 08:57

## 2021-06-29 RX ADMIN — CHLORHEXIDINE GLUCONATE 1 APPLICATION(S): 213 SOLUTION TOPICAL at 11:25

## 2021-06-29 RX ADMIN — HEPARIN SODIUM 700 UNIT(S)/HR: 5000 INJECTION INTRAVENOUS; SUBCUTANEOUS at 23:41

## 2021-06-29 RX ADMIN — Medication 25 MILLIGRAM(S): at 13:39

## 2021-06-29 RX ADMIN — INSULIN GLARGINE 10 UNIT(S): 100 INJECTION, SOLUTION SUBCUTANEOUS at 13:21

## 2021-06-29 RX ADMIN — Medication 25 MILLIGRAM(S): at 05:31

## 2021-06-29 RX ADMIN — Medication 650 MILLIGRAM(S): at 22:20

## 2021-06-29 RX ADMIN — Medication 25 MILLIGRAM(S): at 21:20

## 2021-06-29 RX ADMIN — Medication 4: at 12:39

## 2021-06-29 RX ADMIN — HEPARIN SODIUM 900 UNIT(S)/HR: 5000 INJECTION INTRAVENOUS; SUBCUTANEOUS at 11:52

## 2021-06-29 RX ADMIN — Medication 650 MILLIGRAM(S): at 21:20

## 2021-06-29 RX ADMIN — CLOPIDOGREL BISULFATE 75 MILLIGRAM(S): 75 TABLET, FILM COATED ORAL at 11:03

## 2021-06-29 NOTE — PROGRESS NOTE ADULT - PROBLEM SELECTOR PLAN 1
Pt with hemodynamically mediated LINDSEY/ ATN in the setting of SIRS, STEMI, DKA and medication use (Telmisartan). No previous labs for review. SCr Peaked to 3.04 on 6/25. Today Cr improved to 1.9 mg/dl.  Initial UA with proteinuria, hematuria, pyuria. Spot urine TP/CR 4.6.  Continues to appear hypovolemic on exam.  C/w Holding Telmisartan.  No kidney objection to C discussed with cardiology. Monitor labs and urine output. Avoid NSAIDs, ACEI/ARBS for now. Dose medications as per eGFR

## 2021-06-29 NOTE — PROGRESS NOTE ADULT - ASSESSMENT
Assessment and Recommendations:  86 yo M with PMH of CAD s/p remote hx of PCI (unclear anatomy), uncontrolled T2DM, and HLD p/w hypoxia/AMS admitted to MICU for DKA on insulin gtt and likely late presentation MI. Intubated for AMS, and MICU course c/b ARF and shock liver. Hospital course further c/b afib with RVR, now in sinus rhythm.    #atrial fibrillation  - s/p amiodarone 150mg IV x 1  - TTE with severe global LV systolic dysfunction  - telemetry currently sinus rhythm  - continue metoprolol tartrate 25 q8h  - on heparin gtt, eventual transition to DOAC (will need to discontinue ASA 81 if this happens)  - rest of management regarding late presentation MI per general cardiology team    Bruce Rodriguez MD  Cardiology Fellow  All Cardiology service information can be found 24/7 on [email or web address], password: [email or web address]    Patient seen and examined at bedside. Note is preliminary until signed by attending.

## 2021-06-29 NOTE — PROGRESS NOTE ADULT - SUBJECTIVE AND OBJECTIVE BOX
[facility] Division of Kidney Diseases & Hypertension  FOLLOW UP NOTE  --------------------------------------------------------------------------------  HPI: 85 year old male with PMHx HTN, DM  was brought in by EMS for lethargic and respiratory failure. Per ED note and collateral info from son, patient not feeling well since yesterday and was  Pt presented to the ED and was reportedly hypoxic to 60s and lethargic. Mental status waxing and waning, patient was placed on BIPAP then subsequently intubated for airway protection.  On admission, labs notable for metabolic acidosis, elevated lactate (pH: 7.02, serum CO2 low at 8 with lactate 11.7) and hyperglycemia.  No previous labs for review on [facility]. On admission, pt with elevated SCr of 2.09 (6/10). Pt was found to be hypoxic and in DKA with additional oliguric renal failure, intubated and sedated 6/20 then extubated off pressors and transferred to general medical floor.    Pt seen & examined at bedside this morning.  He is extubated.  Able to nod yes / no.  No chest pain no shortness of breath nods "yes to prior kidney problem."    PAST HISTORY  --------------------------------------------------------------------------------  No significant changes to PMH, PSH, FHx, SHx, unless otherwise noted    ALLERGIES & MEDICATIONS  --------------------------------------------------------------------------------  Allergies    dyes,iv dyes (Unknown)  iodinated radiocontrast agents (Hives)    Intolerances      Standing Inpatient Medications  aspirin  chewable 81 milliGRAM(s) Oral daily  chlorhexidine 2% Cloths 1 Application(s) Topical daily  clopidogrel Tablet 75 milliGRAM(s) Oral daily  dextrose 40% Gel 15 Gram(s) Oral once  dextrose 5%. 1000 milliLiter(s) IV Continuous <Continuous>  dextrose 5%. 1000 milliLiter(s) IV Continuous <Continuous>  dextrose 50% Injectable 25 Gram(s) IV Push once  dextrose 50% Injectable 12.5 Gram(s) IV Push once  dextrose 50% Injectable 25 Gram(s) IV Push once  glucagon  Injectable 1 milliGRAM(s) IntraMuscular once  heparin  Infusion. 900 Unit(s)/Hr IV Continuous <Continuous>  insulin lispro (ADMELOG) corrective regimen sliding scale   SubCutaneous three times a day before meals  insulin lispro (ADMELOG) corrective regimen sliding scale   SubCutaneous at bedtime  metoprolol tartrate 25 milliGRAM(s) Oral three times a day  potassium chloride   Powder 40 milliEquivalent(s) Oral once    PRN Inpatient Medications  heparin   Injectable 4500 Unit(s) IV Push every 6 hours PRN  heparin   Injectable 2000 Unit(s) IV Push every 6 hours PRN      REVIEW OF SYSTEMS  --------------------------------------------------------------------------------  Gen: no fever  Respiratory: no sob  CV: no cp   GI: no ab pain  : urinating  MSK:  no pain    VITALS/PHYSICAL EXAM  --------------------------------------------------------------------------------  T(C): 36.7 (06-29-21 @ 05:27), Max: 37.2 (06-28-21 @ 21:33)  HR: 90 (06-29-21 @ 05:27) (75 - 90)  BP: 167/87 (06-29-21 @ 05:27) (130/90 - 167/87)  ABP: --  ABP(mean): --  RR: 17 (06-29-21 @ 05:27) (17 - 18)  SpO2: 100% (06-29-21 @ 05:27) (100% - 100%)  CVP(mm Hg): --        06-28-21 @ 07:01  -  06-29-21 @ 07:00  --------------------------------------------------------  IN: 200 mL / OUT: 0 mL / NET: 200 mL    Physical Exam:    	Gen: NAD  	HEENT: Anicteric  	Pulm: CTA B/L  	CV: S1S2  	Abd: Soft, +BS   	MSK: No LE edema B/L  	Neuro: Awake  	Skin: Warm and dry    LABS/STUDIES  --------------------------------------------------------------------------------              11.8   9.28  >-----------<  167      [06-29-21 @ 06:31]              38.9     150  |  106  |  69  ----------------------------<  255      [06-29-21 @ 06:31]  3.5   |  28  |  1.94        Ca     9.1     [06-29-21 @ 06:31]      Mg     2.40     [06-29-21 @ 06:31]      Phos  2.6     [06-29-21 @ 06:31]    TPro  6.6  /  Alb  2.9  /  TBili  0.7  /  DBili  x   /  AST  58  /  ALT  92  /  AlkPhos  203  [06-29-21 @ 06:31]      PTT: 82.9       [06-29-21 @ 10:25]      Creatinine Trend:  SCr 1.94 [06-29 @ 06:31]  SCr 2.24 [06-28 @ 08:03]  SCr 2.37 [06-27 @ 16:38]  SCr 2.46 [06-27 @ 06:11]  SCr 2.91 [06-26 @ 00:54]

## 2021-06-29 NOTE — PROGRESS NOTE ADULT - SUBJECTIVE AND OBJECTIVE BOX
Patient seen and examined at bedside.    Overnight Events:   Calm. no events. denies any cardiac symptoms.     REVIEW OF SYSTEMS:  Constitutional:     [x ] negative [ ] fevers [ ] chills [ ] weight loss [ ] weight gain  HEENT:                  [x ] negative [ ] dry eyes [ ] eye irritation [ ] postnasal drip [ ] nasal congestion  CV:                         [ x] negative  [ ] chest pain [ ] orthopnea [ ] palpitations [ ] murmur  Resp:                     [ x] negative [ ] cough [ ] shortness of breath [ ] dyspnea [ ] wheezing [ ] sputum [ ]hemoptysis  GI:                          [ x] negative [ ] nausea [ ] vomiting [ ] diarrhea [ ] constipation [ ] abd pain [ ] dysphagia   :                        [ x] negative [ ] dysuria [ ] nocturia [ ] hematuria [ ] increased urinary frequency  Musculoskeletal: [ x] negative [ ] back pain [ ] myalgias [ ] arthralgias [ ] fracture  Skin:                       [ x] negative [ ] rash [ ] itch  Neurological:        [x ] negative [ ] headache [ ] dizziness [ ] syncope [ ] weakness [ ] numbness  Psychiatric:           [ x] negative [ ] anxiety [ ] depression  Endocrine:            [ x] negative [ ] diabetes [ ] thyroid problem  Heme/Lymph:      [ x] negative [ ] anemia [ ] bleeding problem  Allergic/Immune: [ x] negative [ ] itchy eyes [ ] nasal discharge [ ] hives [ ] angioedema    [ x] All other systems negative  [ ] Unable to assess ROS due to    Current Meds:  aspirin  chewable 81 milliGRAM(s) Oral daily  chlorhexidine 2% Cloths 1 Application(s) Topical daily  clopidogrel Tablet 75 milliGRAM(s) Oral daily  dextrose 40% Gel 15 Gram(s) Oral once  dextrose 5%. 1000 milliLiter(s) IV Continuous <Continuous>  dextrose 5%. 1000 milliLiter(s) IV Continuous <Continuous>  dextrose 50% Injectable 25 Gram(s) IV Push once  dextrose 50% Injectable 25 Gram(s) IV Push once  dextrose 50% Injectable 12.5 Gram(s) IV Push once  glucagon  Injectable 1 milliGRAM(s) IntraMuscular once  heparin   Injectable 4500 Unit(s) IV Push every 6 hours PRN  heparin   Injectable 2000 Unit(s) IV Push every 6 hours PRN  heparin  Infusion. 900 Unit(s)/Hr IV Continuous <Continuous>  insulin lispro (ADMELOG) corrective regimen sliding scale   SubCutaneous three times a day before meals  insulin lispro (ADMELOG) corrective regimen sliding scale   SubCutaneous at bedtime  metoprolol tartrate 25 milliGRAM(s) Oral three times a day  potassium chloride   Powder 40 milliEquivalent(s) Oral once      PAST MEDICAL & SURGICAL HISTORY:  HTN (hypertension)    Diabetes, type I        Vitals:  T(F): 98.1 (06-29), Max: 99 (06-28)  HR: 90 (06-29) (75 - 90)  BP: 167/87 (06-29) (130/90 - 167/87)  RR: 17 (06-29)  SpO2: 100% (06-29)  I&O's Summary      Physical Exam:  Gen-NAD  HENNT-No JVD   Pulm-CTABL anteriorly   CV-RRR, no MRG   Abd-Soft NTND   Ext-No edema                                     11.8   9.28  )-----------( 167      ( 29 Jun 2021 06:31 )             38.9     06-29    150<H>  |  106  |  x   ----------------------------<  x   3.5   |  x   |  x     Ca    9.8      28 Jun 2021 08:03  Mg     2.40     06-29    TPro  7.2  /  Alb  3.1<L>  /  TBili  0.6  /  DBili  x   /  AST  76<H>  /  ALT  116<H>  /  AlkPhos  256<H>  06-28    PTT - ( 29 Jun 2021 06:44 )  PTT:123.1 sec              Cardiovascular Testings:       Interpretation of Telemetry: no events

## 2021-06-29 NOTE — PROGRESS NOTE ADULT - ASSESSMENT
85 year old male with PMHx of DM2 and HTN who presented with AMS, found to be in DKA s/p insulin gtt. Patient was also noted to be hypoxic on presentation requiring intubation. Patient subsequently started on tube feeds. Patient is now currently extubated, but remains on tube feeds via NGT. Patient noted to have episode of hypoglycemia while on tube feeds. Endocrine Team consulted for further management.     1. DM2 c/b hypoglycemia  BG target 100-200 mg/dl given age and comorbidities  Patient admitted with DKA, however all basal insulin was held yesterday in setting of tight glucose control  Patient is now off tube feeding, off dextrose IVF  Ordered for Pureed consistent carbohydrate diet  Most recent  mg/dl  Recommend Lantus 10 units STAT now (1300), will order daily for noontime  Continue Admelog LOW dose correctional scale before meals, low dose at bedtime  Check BG before meals and bedtime    Discharge Plan:   Patient poor historian, will need family assistance. Likely basal/bolus regimen given admission with DKA, however may consider basal/oral plan based on insulin requirements and cpeptide level. Will check cpep for tomorrow AM  To follow, final recommendations TBD     2. Protein calorie malnutrition  Now on PO diet, encourage nutrition     3. HTN  BP target less than 130/80  Above target, management per primary team    Lore Obregon  Nurse Practitioner  Division of Endocrinology & Diabetes  In house pager #[phone]/long range pager #[phone]    If before 9AM or after 6PM, or on weekends/holidays, please call endocrine answering service for assistance ([phone]).  For nonurgent matters email [email or web address] for assistance.  85 year old male with PMHx of DM2 and HTN who presented with AMS, found to be in DKA s/p insulin gtt. Patient was also noted to be hypoxic on presentation requiring intubation. Patient subsequently started on tube feeds. Patient is now currently extubated, but remains on tube feeds via NGT. Patient noted to have episode of hypoglycemia while on tube feeds. Endocrine Team consulted for further management.     1. DM2 c/b hypoglycemia  BG target 100-200 mg/dl given age and comorbidities  Patient admitted with DKA, however all basal insulin (NPH) was held yesterday in setting of tight glucose control while on dextrose IVF, additionally patient was stopping tube feedings  Patient is now off tube feeding, off dextrose IVF  Ordered for pureed consistent carbohydrate diet  Most recent  mg/dl  Recommend Lantus 10 units STAT now (1300), will order daily for noontime  Continue Admelog LOW dose correctional scale before meals, low dose at bedtime  Check BG before meals and bedtime    Discharge Plan:   Patient poor historian, will need family assistance. Likely basal/bolus regimen given admission with DKA, however may consider basal/oral plan based on insulin requirements and cpeptide level. Will check cpep for tomorrow AM  To follow, final recommendations TBD     2. Protein calorie malnutrition  Now on PO diet, encourage nutrition     3. HTN  BP target less than 130/80  Above target, management per primary team    Lroe Obregon  Nurse Practitioner  Division of Endocrinology & Diabetes  In house pager #[phone]/long range pager #[phone]    If before 9AM or after 6PM, or on weekends/holidays, please call endocrine answering service for assistance ([phone]).  For nonurgent matters email [email or web address] for assistance.

## 2021-06-29 NOTE — PROGRESS NOTE ADULT - SUBJECTIVE AND OBJECTIVE BOX
Chief Complaint: DM 2    History: Patient seen at bedside. Unable to participate in ROS. Now OFF tube feeding and D5, on PO diet, Admelog correctional scales only. Most recent BG trending up at 315 mg/dl, no basal insulin administered since NPH 10 units 6/27 at 2358    MEDICATIONS  (STANDING):  aspirin  chewable 81 milliGRAM(s) Oral daily  chlorhexidine 2% Cloths 1 Application(s) Topical daily  clopidogrel Tablet 75 milliGRAM(s) Oral daily  dextrose 40% Gel 15 Gram(s) Oral once  dextrose 5%. 1000 milliLiter(s) (50 mL/Hr) IV Continuous <Continuous>  dextrose 5%. 1000 milliLiter(s) (100 mL/Hr) IV Continuous <Continuous>  dextrose 5%. 500 milliLiter(s) (50 mL/Hr) IV Continuous <Continuous>  dextrose 5%. 500 milliLiter(s) (50 mL/Hr) IV Continuous <Continuous>  dextrose 50% Injectable 25 Gram(s) IV Push once  dextrose 50% Injectable 12.5 Gram(s) IV Push once  dextrose 50% Injectable 25 Gram(s) IV Push once  glucagon  Injectable 1 milliGRAM(s) IntraMuscular once  heparin  Infusion. 900 Unit(s)/Hr (9 mL/Hr) IV Continuous <Continuous>  insulin lispro (ADMELOG) corrective regimen sliding scale   SubCutaneous every 6 hours  metoprolol tartrate 25 milliGRAM(s) Oral three times a day  potassium chloride   Powder 40 milliEquivalent(s) Oral once    MEDICATIONS  (PRN):  heparin   Injectable 4500 Unit(s) IV Push every 6 hours PRN For aPTT less than 40  heparin   Injectable 2000 Unit(s) IV Push every 6 hours PRN For aPTT between 40 - 57      Allergies  dyes,iv dyes (Unknown)  iodinated radiocontrast agents (Hives)    Review of Systems:  UNABLE TO OBTAIN    PHYSICAL EXAM:  VITALS: T(C): 36.7 (06-28-21 @ 13:45)  T(F): 98.1 (06-28-21 @ 13:45), Max: 98.2 (06-28-21 @ 11:35)  HR: 87 (06-28-21 @ 13:45) (75 - 155)  BP: 138/88 (06-28-21 @ 13:45) (97/61 - 145/84)  RR:  (17 - 18)  SpO2:  (100% - 100%)  Wt(kg): --  GENERAL: NAD, appears comfortable  HEENT:  Atraumatic, Normocephalic, moist mucous membranes  RESPIRATORY: nonlabored respirations, no wheezing  PSYCH: unable to assess    CAPILLARY BLOOD GLUCOSE    POCT Blood Glucose.: 315 mg/dL (29 Jun 2021 12:23)  POCT Blood Glucose.: 234 mg/dL (29 Jun 2021 08:41)  POCT Blood Glucose.: 204 mg/dL (29 Jun 2021 00:46)  POCT Blood Glucose.: 200 mg/dL (28 Jun 2021 17:34)  POCT Blood Glucose.: 87 mg/dL (28 Jun 2021 12:51)  POCT Blood Glucose.: 88 mg/dL (28 Jun 2021 08:38)  POCT Blood Glucose.: 78 mg/dL (28 Jun 2021 05:05)  POCT Blood Glucose.: 200 mg/dL (27 Jun 2021 23:39)  POCT Blood Glucose.: 239 mg/dL (27 Jun 2021 18:03)    06-29    150<H>  |  106  |  69<H>  ----------------------------<  255<H>  3.5   |  28  |  1.94<H>    Ca    9.1      29 Jun 2021 06:31  Phos  2.6     06-29  Mg     2.40     06-29    TPro  6.6  /  Alb  2.9<L>  /  TBili  0.7  /  DBili  x   /  AST  58<H>  /  ALT  92<H>  /  AlkPhos  203<H>  06-29        A1C with Estimated Average Glucose Result: 8.0 % (06-24-21 @ 01:11)    Diet, Pureed:   Consistent Carbohydrate No Snacks (CSTCHO)  DASH/TLC Sodium & Cholesterol Restricted (DASH)  [place] Consistency (NECCON)  Vegan Accepts Vegetable Products Only (06-28-21 @ 19:35)

## 2021-06-29 NOTE — PROGRESS NOTE ADULT - ASSESSMENT
85 year old male with hx of diabetes who comes in for AMS, found to be hypoxic and in DKA with additional oliguric renal failure, intubated and sedated 6/20 in the ED due to worsening mental status and inability to protect airway and now with STEMI on medical management. Now extubated and transferred to the floor.      #AMS: likely metabolic encephalopathy iso hypercapnia and renal failure, now improved      #STEMI in leads V1/V2 w/rising troponin and CKMB  -s/p ASA/plavix load; c/w ASA, Plavix;   -hold off statin d/t transaminitis  -c/f acute systolic chf, given pleural effusions and BNP 50k   -echo LVEF 23%, severe global LV dysfxn, stage I diastolic dysfxn  -diuresed with bumex, now off d/t contraction alkalosis/lindsey, off bumex since 6/22  -no plans for cardiac cath until renal fxn improves  -f/u cardiology    #L femoral-deep peroneal artery bypass (on prelim [facility] Duplex arterial)  - w/stenosis of bypass tract w/o occlusion; likely chronic given discoloration and skin changes noted on LE; LLE perfused  -leg arterial doppler: No flow visualized in the right posterior tibial and  peroneal arteries.  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 Left runoff vessels not well visualized.  - Consulted vascular, advised to continue heparin infusion until stable to get an angiogram of LE. Cards clearance pending viability study      Transaminitis , likely shock liver given acute rise in LFTs from WNL to 3000/1600 iso hypoperfusion from MI vs. less likely congestive hepatopathy  - LFTs trending down  - continue to monitor      Renal:     #LINDSEY (no previous labs to assess baseline) c/b oliguric renal failure, likely ATN 2/2 hypoperfusion iso MI vs. hypovolemia prior to MI  - Cr improving likely iso ATN   - contraction alkalosis with hypokalemia  -bumex held, replete K po/iv, repeat BMP this afternoon  -renal ordered NS 75 ml/h x12h, knows her EF is 23%  -monitor fluid status, avoid fluid overload  - trend Lytes, keep K>4 and Mg>2      #DKA, resolved  - AG present but w/o BHB or acidosis; AG likely iso renal failure  - Improved hypoglycemia, FS improving with po diet, will f/u endo regarding insulin dosing    ID:  SIRS w/hypothermia, tachycardia, leukocytosis, lactate w/o obvious source of infection; hypothermia resolved, likely was iso hypoperfusion from STEMI  - RLE w/crusted lesions without associated erythema, warmth, or exudates  - BCx pending NGTD, started empirically on vancomycin/zosyn/doxy (6/20/21- 6/21/21)  - off abx, monitor temp curve    LE lesions:  + LE vasculature stenosis: Vasc planning possible angio pending Cards clearance    HTN  BP elevated, will resume imdur at a lower dose., Trend BP for now 85 year old male with hx of diabetes who comes in for AMS, found to be hypoxic and in DKA with additional oliguric renal failure, intubated and sedated 6/20 in the ED due to worsening mental status and inability to protect airway and now with STEMI on medical management. Now extubated and transferred to the floor.      #AMS: likely metabolic encephalopathy iso hypercapnia and renal failure, now improved      #STEMI in leads V1/V2 w/rising troponin and CKMB  -s/p ASA/plavix load; c/w ASA, Plavix;   -hold off statin d/t transaminitis  -c/f acute systolic chf, given pleural effusions and BNP 50k   -echo LVEF 23%, severe global LV dysfxn, stage I diastolic dysfxn  -diuresed with bumex, now off d/t contraction alkalosis/lindsey, off bumex since 6/22  -no plans for cardiac cath until renal fxn improves  -f/u cardiology    New Onset Afib: likely related to new MI, c/w rate control and Hep gtt for AC pending all studies. Eventual transition to DOAC    #L femoral-deep peroneal artery bypass (on prelim [facility] Duplex arterial)  - w/stenosis of bypass tract w/o occlusion; likely chronic given discoloration and skin changes noted on LE; LLE perfused  -leg arterial doppler: No flow visualized in the right posterior tibial and  peroneal arteries.  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 Left runoff vessels not well visualized.  - Consulted vascular, advised to continue heparin infusion until stable to get an angiogram of LE. Cards clearance pending viability study      Transaminitis , likely shock liver given acute rise in LFTs from WNL to 3000/1600 iso hypoperfusion from MI vs. less likely congestive hepatopathy  - LFTs trending down  - continue to monitor      Renal:     #LINDSEY (no previous labs to assess baseline) c/b oliguric renal failure, likely ATN 2/2 hypoperfusion iso MI vs. hypovolemia prior to MI  - Cr improving likely iso ATN   - contraction alkalosis with hypokalemia  -bumex held, replete K po/iv, repeat BMP this afternoon  -renal ordered NS 75 ml/h x12h, knows her EF is 23%  -monitor fluid status, avoid fluid overload  - trend Lytes, keep K>4 and Mg>2      #DKA, resolved  - AG present but w/o BHB or acidosis; AG likely iso renal failure  - Improved hypoglycemia, FS improving with po diet, will f/u endo regarding insulin dosing    ID:  SIRS w/hypothermia, tachycardia, leukocytosis, lactate w/o obvious source of infection; hypothermia resolved, likely was iso hypoperfusion from STEMI  - RLE w/crusted lesions without associated erythema, warmth, or exudates  - BCx pending NGTD, started empirically on vancomycin/zosyn/doxy (6/20/21- 6/21/21)  - off abx, monitor temp curve    LE lesions:  + LE vasculature stenosis: Vasc planning possible angio pending Cards clearance    HTN  BP elevated, will resume imdur at a lower dose., Trend BP for now

## 2021-06-29 NOTE — PROGRESS NOTE ADULT - SUBJECTIVE AND OBJECTIVE BOX
Overnight Events:   raphael OG was eating breakfast    Telemetry:  NSR 80s    Review Of Systems: No chest pain, shortness of breath, or palpitations  CONSTITUTIONAL: no fevers or chills  EYES/ENT: No visual changes;  No vertigo or throat pain   NECK: No pain or stiffness  RESPIRATORY: No cough, wheezing. No shortness of breath  CARDIOVASCULAR: No chest pain or palpitations  GASTROINTESTINAL: No abdominal or epigastric pain. No nausea, vomiting. No diarrhea. No melena.  GENITOURINARY: No dysuria, frequency or hematuria  NEUROLOGICAL: No numbness or weakness  SKIN: No itching, burning, rashes, or lesions   All other review of systems is negative unless indicated above.     Current Meds:  aspirin  chewable 81 milliGRAM(s) Oral daily  chlorhexidine 2% Cloths 1 Application(s) Topical daily  clopidogrel Tablet 75 milliGRAM(s) Oral daily  dextrose 40% Gel 15 Gram(s) Oral once  dextrose 5%. 1000 milliLiter(s) IV Continuous <Continuous>  dextrose 5%. 1000 milliLiter(s) IV Continuous <Continuous>  dextrose 50% Injectable 25 Gram(s) IV Push once  dextrose 50% Injectable 12.5 Gram(s) IV Push once  dextrose 50% Injectable 25 Gram(s) IV Push once  glucagon  Injectable 1 milliGRAM(s) IntraMuscular once  heparin   Injectable 4500 Unit(s) IV Push every 6 hours PRN  heparin   Injectable 2000 Unit(s) IV Push every 6 hours PRN  heparin  Infusion. 900 Unit(s)/Hr IV Continuous <Continuous>  insulin lispro (ADMELOG) corrective regimen sliding scale   SubCutaneous three times a day before meals  insulin lispro (ADMELOG) corrective regimen sliding scale   SubCutaneous at bedtime  metoprolol tartrate 25 milliGRAM(s) Oral three times a day  potassium chloride   Powder 40 milliEquivalent(s) Oral once    Vitals:  T(F): 98.1 (06-29), Max: 99 (06-28)  HR: 90 (06-29) (75 - 90)  BP: 167/87 (06-29) (130/90 - 167/87)  RR: 17 (06-29)  SpO2: 100% (06-29)  I&O's Summary    28 Jun 2021 07:01  -  29 Jun 2021 07:00  --------------------------------------------------------  IN: 200 mL / OUT: 0 mL / NET: 200 mL    Physical Exam:  Appearance: No acute distress; frail appearing man  Cardiovascular: RRR, S1, S2, no murmurs, rubs, or gallops; no JVD  Respiratory: Clear to auscultation bilaterally  Gastrointestinal: soft, non-tender, non-distended with normal bowel sounds  Extremities: No edema  Musculoskeletal: No clubbing; no joint deformity   Neurologic: Non-focal  Lymphatic: No lymphadenopathy  Psychiatry: AAOx3, mood & affect appropriate  Skin: No rashes, ecchymoses, or cyanosis                          11.8   9.28  )-----------( 167      ( 29 Jun 2021 06:31 )             38.9     06-29    150<H>  |  106  |  69<H>  ----------------------------<  255<H>  3.5   |  28  |  1.94<H>    Ca    9.1      29 Jun 2021 06:31  Phos  2.6     06-29  Mg     2.40     06-29    TPro  6.6  /  Alb  2.9<L>  /  TBili  0.7  /  DBili  x   /  AST  58<H>  /  ALT  92<H>  /  AlkPhos  203<H>  06-29    PTT - ( 29 Jun 2021 06:44 )  PTT:123.1 sec

## 2021-06-29 NOTE — PROGRESS NOTE ADULT - SUBJECTIVE AND OBJECTIVE BOX
[facility] Division of Hospital Medicine  Lucien Pena MD  Pager [phone]      Patient is a 85y old  Male who presents with a chief complaint of AMS, Hypoxia (29 Jun 2021 11:17)      SUBJECTIVE / OVERNIGHT EVENTS: poor historian, denies SOB    MEDICATIONS  (STANDING):  aspirin  chewable 81 milliGRAM(s) Oral daily  chlorhexidine 2% Cloths 1 Application(s) Topical daily  clopidogrel Tablet 75 milliGRAM(s) Oral daily  dextrose 40% Gel 15 Gram(s) Oral once  dextrose 5%. 1000 milliLiter(s) (50 mL/Hr) IV Continuous <Continuous>  dextrose 5%. 1000 milliLiter(s) (100 mL/Hr) IV Continuous <Continuous>  dextrose 50% Injectable 25 Gram(s) IV Push once  dextrose 50% Injectable 12.5 Gram(s) IV Push once  dextrose 50% Injectable 25 Gram(s) IV Push once  glucagon  Injectable 1 milliGRAM(s) IntraMuscular once  heparin  Infusion. 900 Unit(s)/Hr (9 mL/Hr) IV Continuous <Continuous>  insulin lispro (ADMELOG) corrective regimen sliding scale   SubCutaneous three times a day before meals  insulin lispro (ADMELOG) corrective regimen sliding scale   SubCutaneous at bedtime  metoprolol tartrate 25 milliGRAM(s) Oral three times a day  potassium chloride   Powder 40 milliEquivalent(s) Oral once    MEDICATIONS  (PRN):  heparin   Injectable 4500 Unit(s) IV Push every 6 hours PRN For aPTT less than 40  heparin   Injectable 2000 Unit(s) IV Push every 6 hours PRN For aPTT between 40 - 57      CAPILLARY BLOOD GLUCOSE      POCT Blood Glucose.: 315 mg/dL (29 Jun 2021 12:23)  POCT Blood Glucose.: 234 mg/dL (29 Jun 2021 08:41)  POCT Blood Glucose.: 204 mg/dL (29 Jun 2021 00:46)  POCT Blood Glucose.: 200 mg/dL (28 Jun 2021 17:34)  POCT Blood Glucose.: 87 mg/dL (28 Jun 2021 12:51)    I&O's Summary    28 Jun 2021 07:01  -  29 Jun 2021 07:00  --------------------------------------------------------  IN: 200 mL / OUT: 0 mL / NET: 200 mL        PHYSICAL EXAM:  Vital Signs Last 24 Hrs  T(C): 36.7 (29 Jun 2021 05:27), Max: 37.2 (28 Jun 2021 21:33)  T(F): 98.1 (29 Jun 2021 05:27), Max: 99 (28 Jun 2021 21:33)  HR: 90 (29 Jun 2021 05:27) (75 - 90)  BP: 167/87 (29 Jun 2021 05:27) (130/90 - 167/87)  BP(mean): --  RR: 17 (29 Jun 2021 05:27) (17 - 18)  SpO2: 100% (29 Jun 2021 05:27) (100% - 100%)  CONSTITUTIONAL: NAD, lethargy  EYES: conjunctiva and sclera clear  ENMT: mmm  NECK: Supple,  RESPIRATORY: Normal respiratory effort;   CARDIOVASCULAR: Regular rate and rhythm, + S1 and S2  ABDOMEN: Nontender to palpation, normoactive bowel sounds, no rebound/guarding  MUSCULOSKELETAL:  no clubbing or cyanosis of digits;   PSYCH: A+O x 1  NEUROLOGY: no gross deficits   SKIN: No rashes;     LABS:                        11.8   9.28  )-----------( 167      ( 29 Jun 2021 06:31 )             38.9     06-29    150<H>  |  106  |  69<H>  ----------------------------<  255<H>  3.5   |  28  |  1.94<H>    Ca    9.1      29 Jun 2021 06:31  Phos  2.6     06-29  Mg     2.40     06-29    TPro  6.6  /  Alb  2.9<L>  /  TBili  0.7  /  DBili  x   /  AST  58<H>  /  ALT  92<H>  /  AlkPhos  203<H>  06-29    PTT - ( 29 Jun 2021 10:25 )  PTT:82.9 sec

## 2021-06-29 NOTE — PROGRESS NOTE ADULT - ASSESSMENT
86 yo M with PMH of CAD s/p remote hx of PCI (unclear anatomy), uncontrolled T2DM, and HLD p/w hypoxia/AMS admitted to MICU for DKA on insulin gtt and likely late presentation MI. Intubated for AMS, and MICU course c/b ARF and shock liver. Pt was weaned off pressor and extubated in the MICU and transferred to the telemetry floor over the weekend. HC c/b encephalopathy and intermittent agitation, improving.     TTE 6/2021  CONCLUSIONS:  1. Mitral annular calcification, otherwise normal mitral  valve. Minimal mitral regurgitation.  2. Calcified trileaflet aortic valve with normal opening.  Mild aortic regurgitation.  3. Normal left ventricular internal dimensions and wall  thicknesses.  4. Severe global left ventricular systolic dysfunction.  Endocardialvisualization enhanced with intravenous  injection of echo contrast (Definity).  5. Mild diastolic dysfunction (Stage I).  6. The right ventricle is not well visualized; grossly  normal right ventricular systolic function.  7. Estimated right ventricular systolic pressure equals 52  mm Hg, assuming right atrial pressure equals 10 mm Hg,  consistent with moderate pulmonary hypertension.  8. Left pleural effusion.    -cont metoprolol tartrate 25 mg TID, in SR   -cont DAPT  with ASA and Plavix  -atorvastatin 80 mg QHS when liver function recovers   -previously on bumex gtt, monitor UO, may need to be have maintenance diuretic if UO subsides   -ischemic work up with viability study first when cleared by the floor management team     Thank you for allowing us to participate in the care of your patient. If you have any questions or concerns please do not hesitate to contact us 24/7.     Reese Sam MD x [phone]  Cardiology Fellow, [facility]  All Cardiology service information can be found 24/7 on [email or web address], password: [email or web address]

## 2021-06-29 NOTE — PROVIDER CONTACT NOTE (OTHER) - SITUATION
patient pulled out NG tube and ripped off tele monitor and continuous pulse ox
pt very hard stick will need astick for ptt-

## 2021-06-30 ENCOUNTER — TRANSCRIPTION ENCOUNTER (OUTPATIENT)
Age: 86
End: 2021-06-30

## 2021-06-30 VITALS
TEMPERATURE: 98 F | RESPIRATION RATE: 17 BRPM | DIASTOLIC BLOOD PRESSURE: 70 MMHG | OXYGEN SATURATION: 100 % | SYSTOLIC BLOOD PRESSURE: 136 MMHG | HEART RATE: 78 BPM

## 2021-06-30 LAB
APTT BLD: 103.7 SEC — HIGH (ref 27–36.3)
APTT BLD: 54.1 SEC — HIGH (ref 27–36.3)
BASOPHILS # BLD AUTO: 0.07 K/UL — SIGNIFICANT CHANGE UP (ref 0–0.2)
BASOPHILS NFR BLD AUTO: 0.9 % — SIGNIFICANT CHANGE UP (ref 0–2)
EOSINOPHIL # BLD AUTO: 0.31 K/UL — SIGNIFICANT CHANGE UP (ref 0–0.5)
EOSINOPHIL NFR BLD AUTO: 4 % — SIGNIFICANT CHANGE UP (ref 0–6)
GLUCOSE P FAST SERPL-MCNC: 234 MG/DL — HIGH (ref 70–99)
HCT VFR BLD CALC: 36.5 % — LOW (ref 39–50)
HGB BLD-MCNC: 10.5 G/DL — LOW (ref 13–17)
IANC: 5.61 K/UL — SIGNIFICANT CHANGE UP (ref 1.5–8.5)
IMM GRANULOCYTES NFR BLD AUTO: 1.4 % — SIGNIFICANT CHANGE UP (ref 0–1.5)
LYMPHOCYTES # BLD AUTO: 1.07 K/UL — SIGNIFICANT CHANGE UP (ref 1–3.3)
LYMPHOCYTES # BLD AUTO: 13.8 % — SIGNIFICANT CHANGE UP (ref 13–44)
MCHC RBC-ENTMCNC: 28.8 GM/DL — LOW (ref 32–36)
MCHC RBC-ENTMCNC: 29.2 PG — SIGNIFICANT CHANGE UP (ref 27–34)
MCV RBC AUTO: 101.4 FL — HIGH (ref 80–100)
MONOCYTES # BLD AUTO: 0.59 K/UL — SIGNIFICANT CHANGE UP (ref 0–0.9)
MONOCYTES NFR BLD AUTO: 7.6 % — SIGNIFICANT CHANGE UP (ref 2–14)
NEUTROPHILS # BLD AUTO: 5.61 K/UL — SIGNIFICANT CHANGE UP (ref 1.8–7.4)
NEUTROPHILS NFR BLD AUTO: 72.3 % — SIGNIFICANT CHANGE UP (ref 43–77)
NRBC # BLD: 0 /100 WBCS — SIGNIFICANT CHANGE UP
NRBC # FLD: 0 K/UL — SIGNIFICANT CHANGE UP
PLATELET # BLD AUTO: 172 K/UL — SIGNIFICANT CHANGE UP (ref 150–400)
RBC # BLD: 3.6 M/UL — LOW (ref 4.2–5.8)
RBC # FLD: 14 % — SIGNIFICANT CHANGE UP (ref 10.3–14.5)
WBC # BLD: 7.76 K/UL — SIGNIFICANT CHANGE UP (ref 3.8–10.5)
WBC # FLD AUTO: 7.76 K/UL — SIGNIFICANT CHANGE UP (ref 3.8–10.5)

## 2021-06-30 PROCEDURE — 99233 SBSQ HOSP IP/OBS HIGH 50: CPT

## 2021-06-30 PROCEDURE — 99232 SBSQ HOSP IP/OBS MODERATE 35: CPT

## 2021-06-30 PROCEDURE — 99232 SBSQ HOSP IP/OBS MODERATE 35: CPT | Mod: GC

## 2021-06-30 RX ORDER — INSULIN LISPRO 100/ML
2 VIAL (ML) SUBCUTANEOUS
Refills: 0 | Status: DISCONTINUED | OUTPATIENT
Start: 2021-06-30 | End: 2021-06-30

## 2021-06-30 RX ORDER — ATENOLOL 25 MG/1
1 TABLET ORAL
Qty: 0 | Refills: 0 | DISCHARGE

## 2021-06-30 RX ORDER — TELMISARTAN 20 MG/1
1 TABLET ORAL
Qty: 0 | Refills: 0 | DISCHARGE

## 2021-06-30 RX ORDER — APIXABAN 2.5 MG/1
1 TABLET, FILM COATED ORAL
Qty: 60 | Refills: 0
Start: 2021-06-30 | End: 2021-07-29

## 2021-06-30 RX ORDER — METOPROLOL TARTRATE 50 MG
1 TABLET ORAL
Qty: 90 | Refills: 0
Start: 2021-06-30 | End: 2021-07-29

## 2021-06-30 RX ORDER — INSULIN NPH HUM/REG INSULIN HM 70-30/ML
14 VIAL (ML) SUBCUTANEOUS
Qty: 1 | Refills: 0
Start: 2021-06-30 | End: 2021-07-29

## 2021-06-30 RX ORDER — ASPIRIN/CALCIUM CARB/MAGNESIUM 324 MG
1 TABLET ORAL
Qty: 30 | Refills: 0
Start: 2021-06-30 | End: 2021-07-29

## 2021-06-30 RX ORDER — ISOPROPYL ALCOHOL, BENZOCAINE .7; .06 ML/ML; ML/ML
1 SWAB TOPICAL
Qty: 100 | Refills: 1
Start: 2021-06-30 | End: 2021-08-18

## 2021-06-30 RX ORDER — ISOSORBIDE MONONITRATE 60 MG/1
1 TABLET, EXTENDED RELEASE ORAL
Qty: 30 | Refills: 0
Start: 2021-06-30 | End: 2021-07-29

## 2021-06-30 RX ORDER — CLOPIDOGREL BISULFATE 75 MG/1
1 TABLET, FILM COATED ORAL
Qty: 30 | Refills: 0
Start: 2021-06-30 | End: 2021-07-29

## 2021-06-30 RX ORDER — COLESEVELAM HYDROCHLORIDE 625 MG/1
3 TABLET, FILM COATED ORAL
Qty: 0 | Refills: 0 | DISCHARGE

## 2021-06-30 RX ORDER — ISOSORBIDE MONONITRATE 60 MG/1
1 TABLET, EXTENDED RELEASE ORAL
Qty: 0 | Refills: 0 | DISCHARGE

## 2021-06-30 RX ADMIN — CLOPIDOGREL BISULFATE 75 MILLIGRAM(S): 75 TABLET, FILM COATED ORAL at 13:08

## 2021-06-30 RX ADMIN — HEPARIN SODIUM 700 UNIT(S)/HR: 5000 INJECTION INTRAVENOUS; SUBCUTANEOUS at 13:52

## 2021-06-30 RX ADMIN — Medication 2 UNIT(S): at 18:17

## 2021-06-30 RX ADMIN — Medication 25 MILLIGRAM(S): at 13:08

## 2021-06-30 RX ADMIN — ISOSORBIDE MONONITRATE 60 MILLIGRAM(S): 60 TABLET, EXTENDED RELEASE ORAL at 13:08

## 2021-06-30 RX ADMIN — HEPARIN SODIUM 800 UNIT(S)/HR: 5000 INJECTION INTRAVENOUS; SUBCUTANEOUS at 07:18

## 2021-06-30 RX ADMIN — Medication 3: at 12:57

## 2021-06-30 RX ADMIN — INSULIN GLARGINE 10 UNIT(S): 100 INJECTION, SOLUTION SUBCUTANEOUS at 13:07

## 2021-06-30 RX ADMIN — Medication 2: at 08:52

## 2021-06-30 RX ADMIN — CHLORHEXIDINE GLUCONATE 1 APPLICATION(S): 213 SOLUTION TOPICAL at 11:18

## 2021-06-30 RX ADMIN — Medication 2 UNIT(S): at 12:58

## 2021-06-30 RX ADMIN — Medication 81 MILLIGRAM(S): at 13:07

## 2021-06-30 RX ADMIN — HEPARIN SODIUM 2000 UNIT(S): 5000 INJECTION INTRAVENOUS; SUBCUTANEOUS at 07:20

## 2021-06-30 RX ADMIN — Medication 25 MILLIGRAM(S): at 05:42

## 2021-06-30 RX ADMIN — Medication 1: at 18:17

## 2021-06-30 NOTE — DISCHARGE NOTE PROVIDER - NSDCMRMEDTOKEN_GEN_ALL_CORE_FT
aspirin 81 mg oral tablet, chewable: 1 tab(s) orally once a day  clopidogrel 75 mg oral tablet: 1 tab(s) orally once a day   Eliquis 2.5 mg oral tablet: 1 tab(s) orally 2 times a day   isosorbide mononitrate 60 mg oral tablet, extended release: 1 tab(s) orally once a day  metoprolol tartrate 25 mg oral tablet: 1 tab(s) orally 3 times a day  NovoLIN 70/30 FlexPen subcutaneous suspension: 14 unit(s) subcutaneous once a day in the morning, 7 unit(s) subcutaneous once a day in the afternoon    aspirin 81 mg oral tablet, chewable: 1 tab(s) orally once a day  Eliquis 2.5 mg oral tablet: 1 tab(s) orally 2 times a day   isosorbide mononitrate 60 mg oral tablet, extended release: 1 tab(s) orally once a day  metoprolol tartrate 25 mg oral tablet: 1 tab(s) orally 3 times a day  NovoLIN 70/30 FlexPen subcutaneous suspension: 14 unit(s) subcutaneous once a day in the morning, 7 unit(s) subcutaneous once a day in the afternoon

## 2021-06-30 NOTE — CHART NOTE - NSCHARTNOTEFT_GEN_A_CORE
Patient has chronic bilateral lower extremity wounds with previous LLE bypass and stenotic SFA   Patient with multiple medical problems and awaiting cardiac cath vs viability study. Currently too unstable to undergo LLE angiogram.    LLE angiogram is not urgent  When patient medically and cardiac optimized, or improving significantly please call back vascular surgery for further planning    Vascular Surgery [phone]

## 2021-06-30 NOTE — DISCHARGE NOTE PROVIDER - PROVIDER TOKENS
PROVIDER:[TOKEN:[[id number]]],PROVIDER:[TOKEN:[[id number]]],PROVIDER:[TOKEN:[[id number]]],PROVIDER:[TOKEN:[[id number]]],PROVIDER:[TOKEN:[[id number]]]

## 2021-06-30 NOTE — PROGRESS NOTE ADULT - ATTENDING COMMENTS
-following simple commands today  -significant diuresis overnight with episode of hypotension; suspect relative hypovolemia/intravascular depletion as opposed to betablocker effect.  -when more stable will need ischemic evaluation; given renal function we may consider a viability study first.  -continue bblocker as tolerated
84 yo M with PMH of CAD s/p remote hx of PCI (unclear anatomy), uncontrolled T2DM, and HLD p/w hypoxia/AMS admitted to MICU for DKA on insulin gtt and likely late presentation MI. Intubated for AMS, and MICU course c/b ARF and shock liver. Hospital course further c/b afib with RVR, now in sinus rhythm.    #atrial fibrillation  - s/p amiodarone 150mg IV x 1  - TTE with severe global LV systolic dysfunction  - telemetry currently sinus rhythm  - agree with increasing metoprolol tartrate to 25 q8h  - on heparin gtt, eventual transition to DOAC (will need to discontinue ASA 81 if this happens)  - rest of management per general cardiology team
Patient making urine likely from osmotic diuresis w/wo component of post ATN diuresis.  monitor IO no dialysis indication.
Patient seen and examined with Dr. Sam. Agree with his assessment and plan as above  84 yo M with CAD s/p remote hx of PCI (unclear anatomy), DM2, HL, now p/w hypoxia/AMS admitted to MICU for DKA on insulin gtt and likely late presentation MI. Intubated for AMS, and MICU course c/b LINDSEY and shock liver. Pt was weaned off pressor and extubated in the MICU and transferred to the telemetry floor over the weekend, course encephalopathy, AFIB.  -TTE with severe Global LV dysfunction, moderate pHTN, Mild AI.  -Hold diuretics at this time as patient appears euvolemic to dry on exam.   -Cont ASA 81 mg po Qd  -Cont Heparin GTT for AFIB with plan for eventual transition to DOAC. s/p Amio 150 mg IV x 1.  -Plan for ischemic workup with viability study once mental status is stable.    Thank you for this interesting consult. [facility] cardiology team will continue to follow along with you.  -Call [phone] with questions/concerns.    Luis Moss MD  Cardiology Attending  [facility] / [facility],  [facility]   Cell: [phone]
cont DAPT  supportive care and medical management for now as above  hypertensive - start GDMT with carvedilol 3.125mg BID
resolving DKA stable creatinine.  plan discussed with MICU
86 yo M with PMH of CAD s/p remote hx of PCI (unclear anatomy), uncontrolled T2DM, and HLD p/w hypoxia/AMS admitted to MICU for DKA on insulin gtt and likely late presentation MI. Intubated for AMS, and MICU course c/b ARF and shock liver. Hospital course further c/b afib with RVR, now in sinus rhythm.    #atrial fibrillation  - s/p amiodarone 150mg IV x 1  - TTE with severe global LV systolic dysfunction  - telemetry currently sinus rhythm  - continue metoprolol tartrate 25 q8h  - on heparin gtt, eventual transition to DOAC (will need to discontinue ASA 81 if this happens)  - rest of management regarding late presentation MI per general cardiology team
Patient seen and examined with Dr. Sam. Agree with his assessment and plan as above  84 yo M with CAD s/p remote hx of PCI (unclear anatomy), DM2, HL, now p/w hypoxia/AMS admitted to MICU for DKA on insulin gtt and likely late presentation MI. Intubated for AMS, and MICU course c/b LINDSEY and shock liver. Pt was weaned off pressor and extubated in the MICU and transferred to the telemetry floor over the weekend, course encephalopathy, AFIB.  -TTE with severe Global LV dysfunction, moderate pHTN, Mild AI.  -Hold diuretics at this time as patient appears euvolemic to dry on exam.   -Cont ASA 81 mg po QD  -Cont Plavix 75 mg po QD  -Cont Metoprolol 25 mg po BID  -Cont Heparin GTT for AFIB with plan for eventual transition to DOAC (DC aspirin once transitioned to avoid triple therapy) s/p Amio 150 mg IV x 1.  -Plan for ischemic workup with viability study once mental status is stable.    Thank you for this interesting consult. [facility] cardiology team will continue to follow along with you.  -Call [phone] with questions/concerns.    Luis Moss MD  Cardiology Attending  [facility] / [facility],  [facility]   Cell: [phone] .
Patient seen and examined with Dr. Sam. Agree with his assessment and plan as above  86 yo M with CAD s/p remote hx of PCI (unclear anatomy), DM2, HL, now p/w hypoxia/AMS admitted to MICU for DKA on insulin gtt and likely late presentation MI. Intubated for AMS, and MICU course c/b LINDSEY and shock liver. Pt was weaned off pressor and extubated in the MICU and transferred to the telemetry floor over the weekend, course encephalopathy, AFIB.  -TTE with severe Global LV dysfunction, moderate pHTN, Mild AI.  -Hold diuretics at this time as patient appears euvolemic to dry on exam.   -Cont ASA 81 mg po QD  -Cont Plavix 75 mg po QD  -Cont Metoprolol 25 mg po TID  -Cont Heparin GTT for AFIB with plan for eventual transition to DOAC (DC aspirin once transitioned to avoid triple therapy) s/p Amio 150 mg IV x 1.  -Plan for ischemic workup with viability study today.    Thank you for this interesting consult. [facility] cardiology team will continue to follow along with you.  -Call [phone] with questions/concerns.    Luis Moss MD  Cardiology Attending  [facility] / [facility],  [facility]   Cell: [phone] .
cont DAPT  supportive care and medical management for now as above. when stable will plan for ischemic eval  GDMT - increase carvedilol to 6.25mg BID
discussed with primary team above.
troponins trending down  antiplt as above  supportive care and medical management for now
85 M with DM, here with AMS, found to have acute hypoxemic and hypercapnic respiratory failure, DKA/HAGMA, LINDSEY all requiring intubation c/b STEMI.    Patient underwent medical mgmt of STEMI as per cards. c/w heparin gtt, DAPT as per cards.  DKA resolving, will try to lighten sedation today and start PS trials.  If unable to wake up, will start TF and switch to NPH.  Monitor creatinine, UOP given LINDSEY.  Monitor off abx.
85 year old man with diabetes and possible CAD admitted with alteration of mental status few days of diarrhea at home intubated with shock found to have NSTEMI with LINDSEY and elevated liver enzymes    - off sedation   - continue SBT  - continues to have good urine output    - monitor and replete 'lytes  - remains off vasopressors   - follow up cardiology recommendations    prognosis guarded
85 year old man with diabetes and possible CAD admitted with alteration of mental status few days of diarrhea at home intubated with shock found to have NSTEMI with LINDSEY and elevated liver enzymes    - off sedation to assess for possible extubation  - SBT  - oliguria much improved off diuretics with good urine output   - monitor and replete 'lytes  - empiric ABx discontinued cultures all negative  - cardiogenic shock now off vasopressors     prognosis guarded
discussed with team.  LINDSEY stable non oliguric.  can consider giving volume back given met alk.
85 year old man with diabetes and possible CAD admitted with alteration of mental status few days of diarrhea at home intubated with shock found to have NSTEMI with LINDSEY and elevated liver enzymes    - off sedation more awake today  - SBT for possible extubation  - monitor and replete 'lytes, will give back ivf  - follow up cardiology recommendations    prognosis guarded
85 year old man with diabetes and possible CAD admitted with alteration of mental status few days of diarrhea at home intubated with shock found to have NSTEMI with LINDSEY and elevated liver enzymes    - sedated for comfort and vent coordination  - oliguria improving with bumex drip, monitor and replete 'lytes  - on empiric ABx  - septic and cardiogenic shock continue vasopressor support  - follow up cardiology recommendations      prognosis guarded
1. LINDSEY - hemodynamically mediated.  Creatinine stable.  Monitor with holding of diuretics.  2. Metabolic alkalosis - hold diuretics.  See above recommendations.
85 M with DM, here with AMS, found to have acute hypoxemic and hypercapnic respiratory failure, DKA/HAGMA, LINDSEY all requiring intubation c/b STEMI.    Patient extubated yesterday, on bipap prn at this time, taken off this AM and patient doing okay - mentating, speaking, on NC.  LINDSEY slowly improving.  Will hold off on diuretics or further fluids at this time, monitor uop.  c/w heparin gtt for occlusive thrombus (case was discussed with vascular surgery team yesterday who wanted to continue heparin for now).  Can c/w bipap prn at this time  He is alkalotic, if he needs diuretics, would consider diamox (would need blood gas first to truly assess for alkalemia)  sugars controlled, adjust nph as needed
1. LINDSEY - hemodynamically mediated with improving creatinine.   See above recommendations.  2. Metabolic Alkalosis - IVF and KCL  3. Hypokalemia - needs aggressive replacement.  Magnesium levels normal.  Monitor with recommended treatment.

## 2021-06-30 NOTE — PROVIDER CONTACT NOTE (CRITICAL VALUE NOTIFICATION) - TEST AND RESULT REPORTED:
aPTT 123.1
potassium 2.6
Not a candidate for further disease modifying Rx (DMT) due to poor performance status and multi-organ failure (liver/kidneys)
Arterial potassium 2.5
aPTT 131.6

## 2021-06-30 NOTE — PROGRESS NOTE ADULT - ASSESSMENT
84 yo M with PMH of CAD s/p remote hx of PCI (unclear anatomy), uncontrolled T2DM, and HLD p/w hypoxia/AMS admitted to MICU for DKA on insulin gtt and likely late presentation MI. Intubated for AMS, and MICU course c/b ARF and shock liver. Pt was weaned off pressor and extubated in the MICU and transferred to the telemetry floor over the weekend. HC c/b encephalopathy and intermittent agitation, improving now calm.     TTE 6/2021  CONCLUSIONS:  1. Mitral annular calcification, otherwise normal mitral  valve. Minimal mitral regurgitation.  2. Calcified trileaflet aortic valve with normal opening.  Mild aortic regurgitation.  3. Normal left ventricular internal dimensions and wall  thicknesses.  4. Severe global left ventricular systolic dysfunction.  Endocardialvisualization enhanced with intravenous  injection of echo contrast (Definity).  5. Mild diastolic dysfunction (Stage I).  6. The right ventricle is not well visualized; grossly  normal right ventricular systolic function.  7. Estimated right ventricular systolic pressure equals 52  mm Hg, assuming right atrial pressure equals 10 mm Hg,  consistent with moderate pulmonary hypertension.  8. Left pleural effusion.    -cont metoprolol tartrate 25 mg TID, in SR   -cont DAPT  with ASA and Plavix  -eventual plan to transition heparin gtt to DOAC, d/c ASA then to avoid triple therapy and bleeding risk   -atorvastatin 80 mg QHS when liver function recovers   -monitor fluid status for PRN diuretic   -viability study today     Discussed with pt's son Spencer over the phone     Thank you for allowing us to participate in the care of your patient. If you have any questions or concerns please do not hesitate to contact us 24/7.     Reese Sam MD x [phone]  Cardiology Fellow, [facility]  All Cardiology service information can be found 24/7 on [email or web address], password: [email or web address]

## 2021-06-30 NOTE — PROVIDER CONTACT NOTE (CRITICAL VALUE NOTIFICATION) - BACKGROUND
Pt admitted with type 2 DM with ketoacidosis
patient admitted with AMS, metabolic encephalopathy, and hyperglycemia
patient admitted with AMS, metabolic encephalopathy, and hyperglycemia
Pt on Heparin gtt

## 2021-06-30 NOTE — DISCHARGE NOTE PROVIDER - NSDCCPCAREPLAN_GEN_ALL_CORE_FT
PRINCIPAL DISCHARGE DIAGNOSIS  Diagnosis: DKA (diabetic ketoacidoses)  Assessment and Plan of Treatment: You presented with elevated sugars which has since resolved. You are being discharged on Novolin 70/30 mixed insulin pen. You are to take 14 units in the morning and 7 units in afternoon.   Monitor finger sticks pre-meal and bedtime, low salt, fat and carbohydrate diet, minimize glucose intake.  Exercise daily for at least 30 minutes and weight loss.  Follow up with primary care physician and endocrinologist for routine Hemoglobin A1C checks and management.  Follow up with your ophthalmologist for routine yearly vision exams.      SECONDARY DISCHARGE DIAGNOSES  Diagnosis: STEMI (ST elevation myocardial infarction)  Assessment and Plan of Treatment: Your hospital course was complicated by a heart attack. You underwent a partial viability study. You did not complete this study because you decided to sign out against medical advice. You will need to take aspirin daily as well as your Eliquis. Follow up with cardiology within 1-2 weeks.    Diagnosis: Atrial fibrillation, new onset  Assessment and Plan of Treatment: Your hospital course was complicated by new onset atrial fibrillation, an abnormal heart rhythm. You are transitioned to an oral anticoagulant (blood thinner) to help prevent strokes. Please take your medications as prescribed.  Continue to take your blood thinner as prescribed and follow with your physician to monitor your levels.  Low fat diet, reduce caffeine intake, and exercise at least 30 minutes daily.    Diagnosis: Bypass graft stenosis  Assessment and Plan of Treatment: You have narrowing or stenosis of the arteries in your lower legs. You were started on a blood thinner during your admission. You needed intervention of this leg but you required workup of your cardiac issues prior to getting any leg intervention. You left the hospital prior to your workup being completed. Continue to take aspirin and newly started Apixaban.   You were treated with antibiotics but are no longer requiring any more antibiotic therapy.

## 2021-06-30 NOTE — PROVIDER CONTACT NOTE (CRITICAL VALUE NOTIFICATION) - SITUATION
patient has critical result of Arterial potassium 2.5
aPTT 131.6
patient has critical result of potassium 2.5
aPTT 123.1

## 2021-06-30 NOTE — DISCHARGE NOTE NURSING/CASE MANAGEMENT/SOCIAL WORK - PATIENT PORTAL LINK FT
You can access the FollowMyHealth Patient Portal offered by [facility] by registering at the following website: [email or web address]. By joining [employer]’s FollowMyHealth portal, you will also be able to view your health information using other applications (apps) compatible with our system.

## 2021-06-30 NOTE — PROGRESS NOTE ADULT - SUBJECTIVE AND OBJECTIVE BOX
Patient seen and examined at bedside.    Overnight Events:   Much more awake and follows command. Pt feels well, no chest pain, pressure, shortness of breath or palpitation.    REVIEW OF SYSTEMS:  Constitutional:     [x ] negative [ ] fevers [ ] chills [ ] weight loss [ ] weight gain  HEENT:                  [x ] negative [ ] dry eyes [ ] eye irritation [ ] postnasal drip [ ] nasal congestion  CV:                         [ x] negative  [ ] chest pain [ ] orthopnea [ ] palpitations [ ] murmur  Resp:                     [ x] negative [ ] cough [ ] shortness of breath [ ] dyspnea [ ] wheezing [ ] sputum [ ]hemoptysis  GI:                          [ x] negative [ ] nausea [ ] vomiting [ ] diarrhea [ ] constipation [ ] abd pain [ ] dysphagia   :                        [ x] negative [ ] dysuria [ ] nocturia [ ] hematuria [ ] increased urinary frequency  Musculoskeletal: [ x] negative [ ] back pain [ ] myalgias [ ] arthralgias [ ] fracture  Skin:                       [ x] negative [ ] rash [ ] itch  Neurological:        [x ] negative [ ] headache [ ] dizziness [ ] syncope [ ] weakness [ ] numbness  Psychiatric:           [ x] negative [ ] anxiety [ ] depression  Endocrine:            [ x] negative [ ] diabetes [ ] thyroid problem  Heme/Lymph:      [ x] negative [ ] anemia [ ] bleeding problem  Allergic/Immune: [ x] negative [ ] itchy eyes [ ] nasal discharge [ ] hives [ ] angioedema    [ x] All other systems negative  [ ] Unable to assess ROS due to    Current Meds:  aspirin  chewable 81 milliGRAM(s) Oral daily  chlorhexidine 2% Cloths 1 Application(s) Topical daily  clopidogrel Tablet 75 milliGRAM(s) Oral daily  dextrose 40% Gel 15 Gram(s) Oral once  dextrose 5%. 1000 milliLiter(s) IV Continuous <Continuous>  dextrose 5%. 1000 milliLiter(s) IV Continuous <Continuous>  dextrose 50% Injectable 25 Gram(s) IV Push once  dextrose 50% Injectable 12.5 Gram(s) IV Push once  dextrose 50% Injectable 25 Gram(s) IV Push once  glucagon  Injectable 1 milliGRAM(s) IntraMuscular once  heparin   Injectable 4500 Unit(s) IV Push every 6 hours PRN  heparin   Injectable 2000 Unit(s) IV Push every 6 hours PRN  heparin  Infusion. 900 Unit(s)/Hr IV Continuous <Continuous>  insulin glargine Injectable (LANTUS) 10 Unit(s) SubCutaneous <User Schedule>  insulin lispro (ADMELOG) corrective regimen sliding scale   SubCutaneous three times a day before meals  insulin lispro (ADMELOG) corrective regimen sliding scale   SubCutaneous at bedtime  isosorbide   mononitrate ER Tablet (IMDUR) 60 milliGRAM(s) Oral daily  metoprolol tartrate 25 milliGRAM(s) Oral three times a day  potassium chloride   Powder 40 milliEquivalent(s) Oral once      PAST MEDICAL & SURGICAL HISTORY:  HTN (hypertension)    Diabetes, type I        Vitals:  T(F): 97.7 (06-30), Max: 98.2 (06-29)  HR: 84 (06-30) (67 - 92)  BP: 146/70 (06-30) (142/87 - 159/77)  RR: 18 (06-30)  SpO2: 100% (06-30)  I&O's Summary    29 Jun 2021 07:01  -  30 Jun 2021 07:00  --------------------------------------------------------  IN: 250 mL / OUT: 0 mL / NET: 250 mL        Physical Exam:  Gen-NAD  HENNT-No JVD   Pulm-CTABL anteriorly   CV-RRR, no MRG   Abd-Soft NTND   Ext-No edema                                     11.8   9.28  )-----------( 167      ( 29 Jun 2021 06:31 )             38.9     06-29    150<H>  |  106  |  69<H>  ----------------------------<  255<H>  3.5   |  28  |  1.94<H>    Ca    9.1      29 Jun 2021 06:31  Phos  2.6     06-29  Mg     2.40     06-29    TPro  6.6  /  Alb  2.9<L>  /  TBili  0.7  /  DBili  x   /  AST  58<H>  /  ALT  92<H>  /  AlkPhos  203<H>  06-29    PTT - ( 29 Jun 2021 20:53 )  PTT:131.6 sec              Cardiovascular Testings:       Interpretation of Telemetry:

## 2021-06-30 NOTE — DISCHARGE NOTE NURSING/CASE MANAGEMENT/SOCIAL WORK - NSDCFUADDAPPT_GEN_ALL_CORE_FT
Follow up with PCP within 1-2 weeks of discharge.   Follow up with cardiology within 1-2 weeks of discharge.   Follow up with electrophysiology team within 1-2 weeks of discharge.   Follow up with endocrinology within 1-2 weeks of discharge. Please call for an appointment: [address] [place] [address] - [address]  Phone: [phone].   Follow up with nephrology within 1-2 weeks of discharge.   Follow up with vascular within 1-2 weeks of discharge.

## 2021-06-30 NOTE — PROGRESS NOTE ADULT - SUBJECTIVE AND OBJECTIVE BOX
Overnight Events: LENKA, pt awaiting viability study.    Telemetry: NSR 80s    Review Of Systems: No chest pain, shortness of breath, or palpitations  CONSTITUTIONAL: no fevers or chills  EYES/ENT: No visual changes;  No vertigo or throat pain   NECK: No pain or stiffness  RESPIRATORY: No cough, wheezing. No shortness of breath  CARDIOVASCULAR: No chest pain or palpitations  GASTROINTESTINAL: No abdominal or epigastric pain. No nausea, vomiting. No diarrhea. No melena.  GENITOURINARY: No dysuria, frequency or hematuria  NEUROLOGICAL: No numbness or weakness  SKIN: No itching, burning, rashes, or lesions   All other review of systems is negative unless indicated above.     Current Meds:  aspirin  chewable 81 milliGRAM(s) Oral daily  chlorhexidine 2% Cloths 1 Application(s) Topical daily  clopidogrel Tablet 75 milliGRAM(s) Oral daily  dextrose 40% Gel 15 Gram(s) Oral once  dextrose 5%. 1000 milliLiter(s) IV Continuous <Continuous>  dextrose 5%. 1000 milliLiter(s) IV Continuous <Continuous>  dextrose 50% Injectable 25 Gram(s) IV Push once  dextrose 50% Injectable 12.5 Gram(s) IV Push once  dextrose 50% Injectable 25 Gram(s) IV Push once  glucagon  Injectable 1 milliGRAM(s) IntraMuscular once  heparin   Injectable 2000 Unit(s) IV Push every 6 hours PRN  heparin   Injectable 4500 Unit(s) IV Push every 6 hours PRN  heparin  Infusion. 900 Unit(s)/Hr IV Continuous <Continuous>  insulin glargine Injectable (LANTUS) 10 Unit(s) SubCutaneous <User Schedule>  insulin lispro (ADMELOG) corrective regimen sliding scale   SubCutaneous three times a day before meals  insulin lispro (ADMELOG) corrective regimen sliding scale   SubCutaneous at bedtime  isosorbide   mononitrate ER Tablet (IMDUR) 60 milliGRAM(s) Oral daily  metoprolol tartrate 25 milliGRAM(s) Oral three times a day  potassium chloride   Powder 40 milliEquivalent(s) Oral once    Vitals:  T(F): 97.7 (06-30), Max: 98.2 (06-29)  HR: 84 (06-30) (67 - 92)  BP: 146/70 (06-30) (142/87 - 159/77)  RR: 18 (06-30)  SpO2: 100% (06-30)  I&O's Summary    29 Jun 2021 07:01  -  30 Jun 2021 07:00  --------------------------------------------------------  IN: 250 mL / OUT: 0 mL / NET: 250 mL    Physical Exam:  Appearance: No acute distress; frail appearing man  Cardiovascular: RRR, S1, S2, no murmurs, rubs, or gallops; no JVD  Respiratory: Clear to auscultation bilaterally  Gastrointestinal: soft, non-tender, non-distended with normal bowel sounds  Extremities: No edema  Musculoskeletal: No clubbing; no joint deformity   Neurologic: Non-focal  Lymphatic: No lymphadenopathy  Psychiatry: AAOx3, mood & affect appropriate  Skin: No rashes, ecchymoses, or cyanosis                          10.5   7.76  )-----------( 172      ( 30 Jun 2021 06:54 )             36.5     06-29    150<H>  |  106  |  69<H>  ----------------------------<  255<H>  3.5   |  28  |  1.94<H>    Ca    9.1      29 Jun 2021 06:31  Phos  2.6     06-29  Mg     2.40     06-29    TPro  6.6  /  Alb  2.9<L>  /  TBili  0.7  /  DBili  x   /  AST  58<H>  /  ALT  92<H>  /  AlkPhos  203<H>  06-29    PTT - ( 30 Jun 2021 06:54 )  PTT:54.1 sec

## 2021-06-30 NOTE — PROGRESS NOTE ADULT - PROBLEM SELECTOR PROBLEM 2
Protein calorie malnutrition
Acidosis
Protein calorie malnutrition
Protein calorie malnutrition
Hypernatremia
Metabolic alkalosis
Metabolic alkalosis
Hypernatremia

## 2021-06-30 NOTE — PROGRESS NOTE ADULT - NSPROGADDITIONALINFOA_GEN_ALL_CORE
I had an extensive discussion with Pt's son Juancarlos. He accused a Cardiologist of having "rude discussions" with him few days ago. He accused the nursing staff of leaving his father unattended in a room with a pt coughing. He also accused me of telling him that vascular will amputate his father's leg before doing angiogram during our discussion 6/28. I re-explained to him that, I said the decision to amputate or not depends on the outcome of angiogram. He states he is unhappy about the thickened fluid that is being given to his father. I told him it's based on speech and swallow's recs and we cannot deviate from it if his father is full code. He said he going to come in and give him water despite explaining risk of aspiration and/or death. He then proceeded to say he wants to take his father out of the hospital before clearance for discharge today. I explained that would be against medical advice as the risk of heart attack, kidney failure or possible death. He verbalized understanding. Pt to come into the hospital to speak with his father,  if pt agrees to leaving, He will sign his father out AMA and assume the risk. Pt's son has capacity and right to sign his father out AMA.

## 2021-06-30 NOTE — PROGRESS NOTE ADULT - PROBLEM SELECTOR PROBLEM 1
Type 2 diabetes mellitus with hypoglycemia without coma, unspecified whether long term insulin use
LINDSEY (acute kidney injury)
Type 2 diabetes mellitus with hypoglycemia without coma, unspecified whether long term insulin use
LINDSEY (acute kidney injury)
Type 2 diabetes mellitus with hypoglycemia without coma, unspecified whether long term insulin use

## 2021-06-30 NOTE — PROGRESS NOTE ADULT - SUBJECTIVE AND OBJECTIVE BOX
[facility] Division of Kidney Diseases & Hypertension  FOLLOW UP NOTE  --------------------------------------------------------------------------------  HPI: 85 year old male with PMHx HTN, DM  was brought in by EMS for lethargic and respiratory failure. Per ED note and collateral info from son, patient not feeling well since yesterday and was  Pt presented to the ED and was reportedly hypoxic to 60s and lethargic. Mental status waxing and waning, patient was placed on BIPAP then subsequently intubated for airway protection.  On admission, labs notable for metabolic acidosis, elevated lactate (pH: 7.02, serum CO2 low at 8 with lactate 11.7) and hyperglycemia.  No previous labs for review on [facility]. On admission, pt with elevated SCr of 2.09 (6/10). Pt was found to be hypoxic and in DKA with additional oliguric renal failure, intubated and sedated 6/20 then extubated off pressors and transferred to general medical floor.    Pt seen & examined at bedside this morning.  He seems to be more and more awake alert and responsive each day.  This morning he reports no issues with chest pain shortness of breath nausea or vomiting.  Feels comfortable.          PAST HISTORY  --------------------------------------------------------------------------------  No significant changes to PMH, PSH, FHx, SHx, unless otherwise noted    ALLERGIES & MEDICATIONS  --------------------------------------------------------------------------------  Allergies    dyes,iv dyes (Unknown)  iodinated radiocontrast agents (Hives)    Intolerances      Standing Inpatient Medications  aspirin  chewable 81 milliGRAM(s) Oral daily  chlorhexidine 2% Cloths 1 Application(s) Topical daily  clopidogrel Tablet 75 milliGRAM(s) Oral daily  dextrose 40% Gel 15 Gram(s) Oral once  dextrose 5%. 1000 milliLiter(s) IV Continuous <Continuous>  dextrose 5%. 1000 milliLiter(s) IV Continuous <Continuous>  dextrose 50% Injectable 25 Gram(s) IV Push once  dextrose 50% Injectable 12.5 Gram(s) IV Push once  dextrose 50% Injectable 25 Gram(s) IV Push once  glucagon  Injectable 1 milliGRAM(s) IntraMuscular once  heparin  Infusion. 900 Unit(s)/Hr IV Continuous <Continuous>  insulin glargine Injectable (LANTUS) 10 Unit(s) SubCutaneous <User Schedule>  insulin lispro (ADMELOG) corrective regimen sliding scale   SubCutaneous three times a day before meals  insulin lispro (ADMELOG) corrective regimen sliding scale   SubCutaneous at bedtime  isosorbide   mononitrate ER Tablet (IMDUR) 60 milliGRAM(s) Oral daily  metoprolol tartrate 25 milliGRAM(s) Oral three times a day  potassium chloride   Powder 40 milliEquivalent(s) Oral once    PRN Inpatient Medications  heparin   Injectable 2000 Unit(s) IV Push every 6 hours PRN  heparin   Injectable 4500 Unit(s) IV Push every 6 hours PRN      REVIEW OF SYSTEMS  --------------------------------------------------------------------------------  Gen: no fever  Respiratory: no sob  CV: no cp  GI: no ab pain  : no issues with urination  MSK: no pain    VITALS/PHYSICAL EXAM  --------------------------------------------------------------------------------  T(C): 36.5 (06-30-21 @ 05:20), Max: 36.8 (06-29-21 @ 13:30)  HR: 84 (06-30-21 @ 05:20) (67 - 92)  BP: 146/70 (06-30-21 @ 05:20) (142/87 - 159/77)  ABP: --  ABP(mean): --  RR: 18 (06-30-21 @ 05:20) (17 - 18)  SpO2: 100% (06-30-21 @ 05:20) (100% - 100%)  CVP(mm Hg): --        06-29-21 @ 07:01  -  06-30-21 @ 07:00  --------------------------------------------------------  IN: 250 mL / OUT: 0 mL / NET: 250 mL      Physical Exam:    	Gen: NAD  	HEENT: Anicteric  	Pulm: CTA B/L  	CV: S1S2  	Abd: Soft, +BS   	MSK: No LE edema B/L  	Neuro: Awake  	Skin: Warm and dry    LABS/STUDIES  --------------------------------------------------------------------------------              10.5   7.76  >-----------<  172      [06-30-21 @ 06:54]              36.5     150  |  106  |  69  ----------------------------<  255      [06-29-21 @ 06:31]  3.5   |  28  |  1.94        Ca     9.1     [06-29-21 @ 06:31]      Mg     2.40     [06-29-21 @ 06:31]      Phos  2.6     [06-29-21 @ 06:31]    TPro  6.6  /  Alb  2.9  /  TBili  0.7  /  DBili  x   /  AST  58  /  ALT  92  /  AlkPhos  203  [06-29-21 @ 06:31]      PTT: 54.1       [06-30-21 @ 06:54]      Creatinine Trend:  SCr 1.94 [06-29 @ 06:31]  SCr 2.24 [06-28 @ 08:03]  SCr 2.37 [06-27 @ 16:38]  SCr 2.46 [06-27 @ 06:11]  SCr 2.91 [06-26 @ 00:54]

## 2021-06-30 NOTE — PROGRESS NOTE ADULT - ASSESSMENT
Assessment and Recommendations:  84 yo M with PMH of CAD s/p remote hx of PCI (unclear anatomy), uncontrolled T2DM, and HLD p/w hypoxia/AMS admitted to MICU for DKA on insulin gtt and likely late presentation MI. Intubated for AMS, and MICU course c/b ARF and shock liver. Hospital course further c/b afib with RVR, now in sinus rhythm.    #atrial fibrillation  - s/p amiodarone 150mg IV x 1  - TTE with severe global LV systolic dysfunction  - telemetrysinus rhythm 80s  - continue metoprolol tartrate 25 q8h  - on heparin gtt, eventual transition to DOAC (will need to discontinue ASA 81 if this happens)  - rest of management regarding late presentation MI and HFrEF per general cardiology team    Bruce Rodriguez MD  Cardiology Fellow  All Cardiology service information can be found 24/7 on [email or web address], password: [email or web address]    Patient seen and examined at bedside. Note is preliminary until signed by attending.

## 2021-06-30 NOTE — PROGRESS NOTE ADULT - PROVIDER SPECIALTY LIST ADULT
Cardiology
Cardiology
Electrophysiology
Cardiology
Cardiology
Hospitalist
Hospitalist
MICU
Nephrology
Cardiology
Endocrinology
Hospitalist
MICU
MICU
Nephrology
Electrophysiology
Electrophysiology
Nephrology
Vascular Surgery
Cardiology
Cardiology
Hospitalist
MICU
Endocrinology
Nephrology
Nephrology
Endocrinology
Nephrology
Nephrology

## 2021-06-30 NOTE — PROGRESS NOTE ADULT - ATTENDING SUPERVISION STATEMENT
Fellow
Resident
Fellow
Fellow

## 2021-06-30 NOTE — CHART NOTE - NSCHARTNOTEFT_GEN_A_CORE
Pt seen for nutrition follow up.    Medical Course:  Per chart, pt is 85 year old male PMHx DM presenting with AMS found to be hypoxic and in DKA with additional oliguric renal failure, intubated (6/20) in ED due to worsening mental status, inability to protect airway, extubated (6/26) and transferred to floor for medical management.    Nutrition Course:  Pt visibly resting in bed at time of visit, spoke with pt's son at bedside.  Diet progression: NPO (6/20-6/22), EN via NGT (6/22-6/27), PO (6/28-present).  Pt s/p bedside swallow assessment (6/27) recommending pureed solids with nectar consistency liquids- in line with pt's current diet order, which he is tolerating well. Good PO intake per chart review. Pt requires total assistance with feeding at meal times.  Pt lives with son who is primary care taker. Discussed pureed diet and recommendation of nectar consistency liquids. Pt's son providing pt with thin liquids, visible at bedside, aware of risk. Confirms vegan dietary preference.  Pt with history of type 1 DM, on Novolog 70/30 30U TID PTA, HbA1c (6/24) 8.0%. Endocrinology following and adjusting insulin regimen as appropriate. Pt follows with PCP for DM management.   No noted GI distress per chart, no bowel regimen ordered at this time.    Diet Prescription:   Pureed, Consistent Carbohydrate {No Snacks}, DASH/TLC, Nectar Consistency, Vegan     Pertinent Medications:   LANTUS 10 Unit(s), ADMELOG corrective regimen sliding scale, ADMELOG 2 Unit(s) TID, potassium chloride Powder     Pertinent Labs:   (6/29) Na 150 mmol/L<H> Glu 255 mg/dL<H> K+ 3.5 mmol/L Cr 1.94 mg/dL<H> BUN 69 mg/dL<H> Phos 2.6 mg/dL Alb 2.9 g/dL<L>  (6/24) HbA1c 8.0%<H>  POCT: (6/30) 242-271, (6/29) 171-315    Weight: (6/26) 128.7 lbs / 58.4 kg, (6/20 dosing) 125 lbs / 56.7 kg  Height: 66 in / 167.6 cm  IBW: 142 lbs / 64.4 kg +/-10%  BMI: 20.8 kg/m^2 (at most recent weight)    Physical Assessment, per flowsheets:  Edema: 1+ generalized, 2+ bilateral hand  Pressure Injury: None noted.    Estimated Needs:   [X] Recalculated, with consideration for extubation/ICU stepdown  Based on dosing weight 125 lbs / 56.7 kg  0503-6901 kcal daily @28-32 kcal/kg, 56.7-68.04 gm protein daily @1.0-1.2 gm/kg     Previous Nutrition Diagnosis: [X] Inadequate Energy Intake, improved  New Nutrition Diagnosis: [X] Swallowing difficulty related to motor/mechanical causes as evidenced by s/p bedside swallow assessment recommending modified texture/consistency diet.    Interventions:   1) Recommend continue current diet to assist with glycemic management. Diet texture/consistency per speech recommendations/medical discretion.  2) RDN remains available PRN to discuss alternative menu options PRN.     Monitor & Evaluate:  PO intake, nutrition related lab values, weight trends, BMs/GI distress, hydration status, skin integrity.  Shaylee Payne RDN, CDN #[id number] Pt seen for nutrition follow up.    Medical Course:  Per chart, pt is 85 year old male PMHx DM presenting with AMS found to be hypoxic and in DKA with additional oliguric renal failure, intubated (6/20) in ED due to worsening mental status, inability to protect airway, extubated (6/26) and transferred to floor for medical management.    Nutrition Course:  Pt visibly resting in bed at time of visit, spoke with pt's son at bedside.  Diet progression: NPO (6/20-6/22), EN via NGT (6/22-6/27), PO (6/28-present).  Pt s/p bedside swallow assessment (6/27) recommending pureed solids with nectar consistency liquids- in line with pt's current diet order, which he is tolerating well. Good PO intake per chart review. Pt requires total assistance with feeding at meal times.  Pt lives with son who is primary care taker. Discussed pureed diet and recommendation of nectar consistency liquids. Pt's son providing pt with thin liquids, visible at bedside, aware of risk. Confirms vegan dietary preference.  Pt with history of DM, on Novolog 70/30 30U TID PTA, HbA1c (6/24) 8.0%. Endocrinology following and adjusting insulin regimen as appropriate. Pt follows with PCP for DM management.   No noted GI distress per chart, no bowel regimen ordered at this time.    Diet Prescription:   Pureed, Consistent Carbohydrate {No Snacks}, DASH/TLC, Nectar Consistency, Vegan     Pertinent Medications:   LANTUS 10 Unit(s), ADMELOG corrective regimen sliding scale, ADMELOG 2 Unit(s) TID, potassium chloride Powder     Pertinent Labs:   (6/29) Na 150 mmol/L<H> Glu 255 mg/dL<H> K+ 3.5 mmol/L Cr 1.94 mg/dL<H> BUN 69 mg/dL<H> Phos 2.6 mg/dL Alb 2.9 g/dL<L>  (6/24) HbA1c 8.0%<H>  POCT: (6/30) 242-271, (6/29) 171-315    Weight: (6/26) 128.7 lbs / 58.4 kg, (6/20 dosing) 125 lbs / 56.7 kg  Height: 66 in / 167.6 cm  IBW: 142 lbs / 64.4 kg +/-10%  BMI: 20.8 kg/m^2 (at most recent weight)    Physical Assessment, per flowsheets:  Edema: 1+ generalized, 2+ bilateral hand  Pressure Injury: None noted.    Estimated Needs:   [X] Recalculated, with consideration for extubation/ICU stepdown  Based on dosing weight 125 lbs / 56.7 kg  8149-3833 kcal daily @28-32 kcal/kg, 56.7-68.04 gm protein daily @1.0-1.2 gm/kg     Previous Nutrition Diagnosis: [X] Inadequate Energy Intake, improved  New Nutrition Diagnosis: [X] Swallowing difficulty related to motor/mechanical causes as evidenced by s/p bedside swallow assessment recommending modified texture/consistency diet.    Interventions:   1) Recommend continue current diet to assist with glycemic management. Diet texture/consistency per speech recommendations/medical discretion.  2) RDN remains available PRN to discuss alternative menu options PRN.     Monitor & Evaluate:  PO intake, nutrition related lab values, weight trends, BMs/GI distress, hydration status, skin integrity.  Shaylee Payne RDN, CDN #[id number]

## 2021-06-30 NOTE — PROVIDER CONTACT NOTE (CRITICAL VALUE NOTIFICATION) - ACTION/TREATMENT ORDERED:
awaiting response    other provider (Marcus Singh) input potassium order
awaiting response    other provider (Marcus Singh) input potassium order
Follow the nomogram
repeat aPTT

## 2021-06-30 NOTE — DISCHARGE NOTE PROVIDER - HOSPITAL COURSE
70/30 - flex pen - son stays 12 bid and pharmacy ordered 30u TID   based on requirements adjust to 14 am, 7pm - no orther regimen    pcp     85 year old male with PMHx of diabetes who presented for AMS, found to be hypoxic and in DKA with additional oliguric renal failure. Patient was intubated in the ED 2/2 worsening mental status and inability to protect airway. Course c/b STEMI.     1. AMS: likely metabolic encephalopathy given hypercapnia and renal failure, now improved    2. STEMI  - ST elevations in V1/V2 and elevated troponins   - s/p ASA/Plavix load, to continue. Off statin 2/2 transaminitis.   - Echo LVEF 23%, severe global LV dysfxn, stage I diastolic dysfxn  - Patient placed on   - Planned for viability study     3. Acute systolic CHF   - Elevated BNP and pleural effusions, diuresed with bumex, now off 2/2 contraction alkalosis and LINDSEY   - Planned for cardiac viability study prior to cardiac catherization plans.     4. New Onset Afib   - On hep gtt, will need DOAC upon DC   - Continue rate control.     5. L femoral-deep peroneal artery bypass (on prelim [facility] Duplex arterial)  - w/stenosis of bypass tract w/o occlusion; likely chronic given discoloration and skin changes noted on LE; LLE perfused  -leg arterial doppler: No flow visualized in the right posterior tibial and peroneal arteries.  Acute, occlusive thrombosis of the left superficial femoral and popliteal arteries. Left runoff vessels not well visualized.   - Consulted vascular, advised to continue heparin infusion until stable to get an angiogram of LE. - Cardiology awaiting viability study prior to clearing for bypass     6. Transaminitis  - likely shock liver given acute rise in LFTs from WNL to 3000/1600 iso hypoperfusion from MI vs. less likely congestive hepatopathy  - LFTs trending down    7. LINDSEY (no previous labs to assess baseline) c/b oliguric renal failure, likely ATN 2/2 hypoperfusion iso MI vs. hypovolemia prior to MI  - Cr improving   - Bumex held    8. DKA  - resolved, likely iso renal failure  - Improved hypoglycemia, FS improving with po diet  - Endo recommending Novolin 70/30 mixed insulin pen 14U in AM and 7U in PM     ID:  SIRS w/hypothermia, tachycardia, leukocytosis, lactate w/o obvious source of infection; hypothermia resolved, likely was iso hypoperfusion from STEMI  - RLE w/crusted lesions without associated erythema, warmth, or exudates  - BCx pending NGTD, started empirically on vancomycin/zosyn/doxy (6/20/21- 6/21/21)  - off abx, monitor temp curve      HTN  BP elevated, will resume imdur at a lower dose., Trend BP for now       Additional Information:  Additional Information: I had an extensive discussion with Pt's son Juancarlos. He accused a Cardiologist of having "rude discussions" with him few days ago. He accused the nursing staff of leaving his father unattended in a room with a pt coughing. He also accused me of telling him that vascular will amputate his father's leg before doing angiogram during our discussion 6/28. I re-explained to him that, I said the decision to amputate or not depends on the outcome of angiogram. He states he is unhappy about the thickened fluid that is being given to his father. I told him it's based on speech and swallow's recs and we cannot deviate from it if his father is full code. He said he going to come in and give him water despite explaining risk of aspiration and/or death. He then proceeded to say he wants to take his father out of the hospital before clearance for discharge today. I explained that would be against medical advice as the risk of heart attack, kidney failure or possible death. He verbalized understanding. Pt to come into the hospital to speak with his father,  if pt agrees to leaving, He will sign his father out AMA and assume the risk. Pt's son has capacity and right to sign his father out AMA.   85 year old male with PMHx of diabetes who presented for AMS, found to be hypoxic and in DKA with additional oliguric renal failure. Patient was intubated in the ED 2/2 worsening mental status and inability to protect airway. Course c/b STEMI.     1. AMS: likely metabolic encephalopathy given hypercapnia and renal failure, now improved  2. STEMI  - ST elevations in V1/V2 and elevated troponins   - s/p ASA/Plavix load, to continue. Off statin 2/2 transaminitis.   - Echo LVEF 23%, severe global LV dysfxn, stage I diastolic dysfxn  - Patient placed on   - Planned for viability study   3. Acute systolic CHF   - Elevated BNP and pleural effusions, diuresed with bumex, now off 2/2 contraction alkalosis and LINDSEY   - Planned for cardiac viability study prior to cardiac catherization plans.   4. New Onset Afib   - On hep gtt, will need DOAC upon DC   - Continue rate control.   5. L femoral-deep peroneal artery bypass (on prelim [facility] Duplex arterial)  - w/stenosis of bypass tract w/o occlusion; likely chronic given discoloration and skin changes noted on LE; LLE perfused  -leg arterial doppler: No flow visualized in the right posterior tibial and peroneal arteries.  Acute, occlusive thrombosis of the left superficial femoral and popliteal arteries. Left runoff vessels not well visualized.   - Consulted vascular, advised to continue heparin infusion until stable to get an angiogram of LE. Cardiology awaiting viability study prior to clearing for bypass.   - Patient was treaetd empirically for leg wounds with Vanc, Zosyn and Doxy   6. Transaminitis  - likely shock liver given acute rise in LFTs from WNL to 3000/1600 iso hypoperfusion from MI vs. less likely congestive hepatopathy  - LFTs trending down  7. LINDSEY (no previous labs to assess baseline) c/b oliguric renal failure, likely ATN 2/2 hypoperfusion iso MI vs. hypovolemia prior to MI  - Cr improving   - Bumex held  8. DKA  - resolved, likely iso renal failure  - Improved hypoglycemia, FS improving with po diet  - Endo recommending Novolin 70/30 mixed insulin pen 14U in AM and 7U in PM     Called by nurse to evaluate patient whose son (Juancarlos) wishes to sign his father out of the hospital against medical advice. Patient is A&Ox2, son is A&O x3. Patient and son were informed of medical conditions, benefits, and alternatives to treatment as well as the risks of refusing treatment and the seriousness of leaving against medical advice including patient harm, injury or death. Patient is still acute in terms of medical treatment. I have offered to answer any questions and fully answer such questions, witnessed by the nurse.  At this time, both the patient and son wish to proceed with signing out against medical advice. See attending attestation note today for conversation with patient's son Juancarlos and Dr Pena.

## 2021-06-30 NOTE — PROGRESS NOTE ADULT - SUBJECTIVE AND OBJECTIVE BOX
[facility] Division of Hospital Medicine  Lucien Pena MD  Pager [phone]      Patient is a 85y old  Male who presents with a chief complaint of AMS, Hypoxia (30 Jun 2021 10:38)      SUBJECTIVE / OVERNIGHT EVENTS: Denies pain, Wants more water. No CP or SOB    MEDICATIONS  (STANDING):  aspirin  chewable 81 milliGRAM(s) Oral daily  chlorhexidine 2% Cloths 1 Application(s) Topical daily  clopidogrel Tablet 75 milliGRAM(s) Oral daily  dextrose 40% Gel 15 Gram(s) Oral once  dextrose 5%. 1000 milliLiter(s) (50 mL/Hr) IV Continuous <Continuous>  dextrose 5%. 1000 milliLiter(s) (100 mL/Hr) IV Continuous <Continuous>  dextrose 50% Injectable 25 Gram(s) IV Push once  dextrose 50% Injectable 12.5 Gram(s) IV Push once  dextrose 50% Injectable 25 Gram(s) IV Push once  glucagon  Injectable 1 milliGRAM(s) IntraMuscular once  heparin  Infusion. 900 Unit(s)/Hr (9 mL/Hr) IV Continuous <Continuous>  insulin glargine Injectable (LANTUS) 10 Unit(s) SubCutaneous <User Schedule>  insulin lispro (ADMELOG) corrective regimen sliding scale   SubCutaneous three times a day before meals  insulin lispro (ADMELOG) corrective regimen sliding scale   SubCutaneous at bedtime  insulin lispro Injectable (ADMELOG) 2 Unit(s) SubCutaneous three times a day before meals  isosorbide   mononitrate ER Tablet (IMDUR) 60 milliGRAM(s) Oral daily  metoprolol tartrate 25 milliGRAM(s) Oral three times a day  potassium chloride   Powder 40 milliEquivalent(s) Oral once    MEDICATIONS  (PRN):  heparin   Injectable 4500 Unit(s) IV Push every 6 hours PRN For aPTT less than 40  heparin   Injectable 2000 Unit(s) IV Push every 6 hours PRN For aPTT between 40 - 57      CAPILLARY BLOOD GLUCOSE      POCT Blood Glucose.: 271 mg/dL (30 Jun 2021 12:40)  POCT Blood Glucose.: 242 mg/dL (30 Jun 2021 08:32)  POCT Blood Glucose.: 191 mg/dL (29 Jun 2021 21:41)  POCT Blood Glucose.: 171 mg/dL (29 Jun 2021 17:56)    I&O's Summary    29 Jun 2021 07:01  -  30 Jun 2021 07:00  --------------------------------------------------------  IN: 250 mL / OUT: 0 mL / NET: 250 mL    30 Jun 2021 07:01  -  30 Jun 2021 12:54  --------------------------------------------------------  IN: 200 mL / OUT: 0 mL / NET: 200 mL        PHYSICAL EXAM:  Vital Signs Last 24 Hrs  T(C): 36.5 (30 Jun 2021 05:20), Max: 36.8 (29 Jun 2021 13:30)  T(F): 97.7 (30 Jun 2021 05:20), Max: 98.2 (29 Jun 2021 13:30)  HR: 84 (30 Jun 2021 05:20) (67 - 92)  BP: 146/70 (30 Jun 2021 05:20) (142/87 - 159/77)  BP(mean): --  RR: 18 (30 Jun 2021 05:20) (17 - 18)  SpO2: 100% (30 Jun 2021 05:20) (100% - 100%)  CONSTITUTIONAL: NAD  EYES: conjunctiva and sclera clear  ENMT: mmm  NECK: Supple,  RESPIRATORY: Normal respiratory effort; lungs are clear to auscultation bilaterally  CARDIOVASCULAR: Regular rate and rhythm, + S1 and S2  ABDOMEN: Nontender to palpation, normoactive bowel sounds, no rebound/guarding  MUSCULOSKELETAL:  no clubbing or cyanosis of digits;   PSYCH: A+O x 1-2 ( self and place)  NEUROLOGY: no gross deficits   SKIN: No rashes;     LABS:                        10.5   7.76  )-----------( 172      ( 30 Jun 2021 06:54 )             36.5     06-29    150<H>  |  106  |  69<H>  ----------------------------<  255<H>  3.5   |  28  |  1.94<H>    Ca    9.1      29 Jun 2021 06:31  Phos  2.6     06-29  Mg     2.40     06-29    TPro  6.6  /  Alb  2.9<L>  /  TBili  0.7  /  DBili  x   /  AST  58<H>  /  ALT  92<H>  /  AlkPhos  203<H>  06-29    PTT - ( 30 Jun 2021 06:54 )  PTT:54.1 sec

## 2021-06-30 NOTE — PROGRESS NOTE ADULT - REASON FOR ADMISSION
AMS, Hypoxia

## 2021-06-30 NOTE — DISCHARGE NOTE PROVIDER - CARE PROVIDERS DIRECT ADDRESSES
,[email or web address],DirectAddress_Unknown,DirectAddress_Unknown

## 2021-06-30 NOTE — PROGRESS NOTE ADULT - PROBLEM SELECTOR PLAN 2
Hypernatremia in the setting of hyperglycemia and impaired access to free water.  Encourage PO intake, glucose control.  can you 1/2 NS if patient unable to take in PO would avoid dextrose as glucose already high and osmotic diuresis might make sodium worse.
Hypernatremia in the setting of hyperglycemia and impaired access to free water.  Encourage PO intake, glucose control.  can you 1/2 NS if patient unable to take in PO would avoid dextrose as glucose already high and osmotic diuresis might make sodium worse.  Sodium level today pending.
In setting of overdiuresis, bicarb 32 on BMP, pH 7.52, pC02 47.   Would supplement with KCL 40meq to improve the alkalemia. Hold further diuretics. Be cautious with the Non invasive ventilation as you may blow down Co2 and worsen the alkalemia. Serial ABG's
In setting of overdiuresis, bicarb 38 on BMP, pH 7.52, pC02 51. K is 2.5   Would supplement with KCL 40meq and IV KCL 10meq x 3 to improve the hypokalemia/alkalemia. Hold further diuretics. Be cautious with the Non invasive ventilation as you may blow down Co2 and worsen the alkalemia. Serial ABG's and BMP to assess for hypokalemia  Would also start NS at 75cc/hr x 12 hrs and reassess volume status

## 2021-06-30 NOTE — PROVIDER CONTACT NOTE (CRITICAL VALUE NOTIFICATION) - ASSESSMENT
patient resting in bed, no events on tele
no s/s of bleeding
no s/s of bleeding
patient resting in bed, no events on tele

## 2021-06-30 NOTE — PROGRESS NOTE ADULT - ASSESSMENT
85 year old male with PMHx of DM2 and HTN who presented with AMS, found to be in DKA s/p insulin gtt. Patient was also noted to be hypoxic on presentation requiring intubation. Patient subsequently started on tube feeds. Patient is now currently extubated, but remains on tube feeds via NGT. Patient noted to have episode of hypoglycemia while on tube feeds. Endocrine Team consulted for further management.     1. DM2 c/b hypoglycemia  BG target 100-200 mg/dl given age and comorbidities  Patient is now off tube feeding, off dextrose IVF  Ordered for pureed consistent carbohydrate diet, tolerating  Added Admelog 2 units SQ TID before meals (Hold if NPO/not eating meal)  Increase Lantus to 12 units SQ daily at 12 PM   Continue Admelog LOW dose correctional scale before meals, low dose at bedtime  Check BG before meals and bedtime    Discharge Plan:   Recommended basal/bolus or basal/oral regimen - son refuses and states he does not want any changes not recommended by the primary care doctor  Per son patient takes Novolin 70/30 mixed insulin PEN. Amount is unclear - per son it is 12 units BID, per pharmacy the prescription is 30 units TID. Would recommend to STOP taking 3x per day, this is incorrect. 70/30 should be taken twice a day. Would recommend adjusting to weight based dosing/dose based on insulin requirements here (total daily dose is 22 units): 14 units SQ in AM, 7 units SQ in PM  Cpeptide not sent this morning as requested, if remaining inpatient would send with morning labs   Followup with PCP and would recommend referral to endocrinology as outpatient    2. Protein calorie malnutrition  Now on PO diet, encourage nutrition     3. HTN  BP target less than 130/80  Above target, management per primary team    Lore Obregon  Nurse Practitioner  Division of Endocrinology & Diabetes  In house pager #[phone]/long range pager #[phone]    If before 9AM or after 6PM, or on weekends/holidays, please call endocrine answering service for assistance ([phone]).  For nonurgent matters email [email or web address] for assistance.   Greater than 35 minutes spent in assessment, coordination of care and education

## 2021-06-30 NOTE — PROGRESS NOTE ADULT - PROBLEM SELECTOR PLAN 1
Pt with hemodynamically mediated LINDSEY/ ATN in the setting of SIRS, STEMI, DKA and medication use (Telmisartan). No previous labs for review. SCr Peaked to 3.04 on 6/25. Cr improved to 1.9 mg/dl but todays labs presently pending.  Initial UA with proteinuria, hematuria, pyuria. Spot urine TP/CR 4.6.  Continues to appear hypovolemic on exam.  C/w Holding Telmisartan.  No kidney objection to C discussed with cardiology.  Viability study to be performed today per cardiology.  If LHC indicated would use intravenous fluids pre and post.  Monitor labs and urine output. Avoid NSAIDs, ACEI/ARBS for now. Dose medications as per eGFR

## 2021-06-30 NOTE — DISCHARGE NOTE PROVIDER - CARE PROVIDER_API CALL
Luis Moss  Internal Medicine  [address] [place] [address]  Phone: [phone]  Fax: [phone]  Follow Up Time:     Clement Zaldivar)  EndocrinologyMetabDiabetes  [address] [place] [address]  Phone: [phone]  Fax: [phone]  Follow Up Time:     Woody Eller)  Cardiac Electrophysiology; Cardiovascular Disease; Internal Medicine  [address] [place] [address], [address]  [place] [address]  Phone: [phone]  Fax: [phone]  Follow Up Time:     Nelson Puente)  Internal Medicine; Nephrology  [address] [place] [address]  Phone: [phone]  Fax: [phone]  Follow Up Time:     Brenton Aj)  Vascular Surgery  [address] [place] [address]  Phone: [phone]  Fax: [phone]  Follow Up Time:

## 2021-06-30 NOTE — PROGRESS NOTE ADULT - ASSESSMENT
85 year old male with hx of diabetes who comes in for AMS, found to be hypoxic and in DKA with additional oliguric renal failure, intubated and sedated 6/20 in the ED due to worsening mental status and inability to protect airway and now with STEMI on medical management. Now extubated and transferred to the floor.      #AMS: likely metabolic encephalopathy iso hypercapnia and renal failure, now improved      #STEMI in leads V1/V2 w/rising troponin and CKMB  -s/p ASA/plavix load; c/w ASA, Plavix;   -hold off statin d/t transaminitis  -c/f acute systolic chf, given pleural effusions and BNP 50k   -echo LVEF 23%, severe global LV dysfxn, stage I diastolic dysfxn  -diuresed with bumex, now off d/t contraction alkalosis/lindsey, off bumex since 6/22  -no plans for cardiac cath until renal fxn improves ( renal cleared for CATH with pre-treatment)  -f/u cardiology    New Onset Afib: likely related to new MI, c/w rate control and Hep gtt for AC pending all studies. Eventual transition to DOAC    #L femoral-deep peroneal artery bypass (on prelim VA Duplex arterial)  - w/stenosis of bypass tract w/o occlusion; likely chronic given discoloration and skin changes noted on LE; LLE perfused  -leg arterial doppler: No flow visualized in the right posterior tibial and  peroneal arteries.  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 Left runoff vessels not well visualized.  - Consulted vascular, advised to continue heparin infusion until stable to get an angiogram of LE. Cards clearance pending viability study      Transaminitis , likely shock liver given acute rise in LFTs from WNL to 3000/1600 iso hypoperfusion from MI vs. less likely congestive hepatopathy  - LFTs trending down  - continue to monitor      Renal:     #LINDSEY (no previous labs to assess baseline) c/b oliguric renal failure, likely ATN 2/2 hypoperfusion iso MI vs. hypovolemia prior to MI  - Cr improving likely iso ATN   - contraction alkalosis with hypokalemia  -bumex held, replete K po/iv, repeat BMP this afternoon  -renal ordered NS 75 ml/h x12h, knows her EF is 23%  -monitor fluid status, avoid fluid overload  - trend Lytes, keep K>4 and Mg>2      #DKA, resolved  - AG present but w/o BHB or acidosis; AG likely iso renal failure  - Improved hypoglycemia, FS improving with po diet, will f/u endo regarding insulin dosing    ID:  SIRS w/hypothermia, tachycardia, leukocytosis, lactate w/o obvious source of infection; hypothermia resolved, likely was iso hypoperfusion from STEMI  - RLE w/crusted lesions without associated erythema, warmth, or exudates  - BCx pending NGTD, started empirically on vancomycin/zosyn/doxy (6/20/21- 6/21/21)  - off abx, monitor temp curve    LE lesions:  + LE vasculature stenosis: Vasc planning possible angio pending Cards clearance    HTN  BP elevated, will resume imdur at a lower dose., Trend BP for now

## 2021-06-30 NOTE — PROGRESS NOTE ADULT - SUBJECTIVE AND OBJECTIVE BOX
Chief Complaint: DM 2 with hyperglycemia     History: Patient seen at bedside. Patient remains on pureed diet, per primary RN, eating well - ate 100% of breakfast this AM. Most recent BG elevated to 271 mg/dl     Spoke with patient's son Juancarlos at [phone] for collateral information - no hx obtained regarding DM history on initial consult due to patient's mental status  Per son, patient takes Novolog 70/30 PEN at home, he believes it is 12 units 2x per day (morning and evening).   Son reports patient uses [facility] [address] [facility] in [place].   Contacted pharmacy at [phone]: Per pharmacy staff, patient is prescribed Novolog 70/30 FlexPen, 30 units TID (confirmed with pharmacist, ordered as 3x per day, not twice), last filled April 2021  Son is unsure of glucose levels at home. EMS found BG to be "HIGH" on glucometer (over 600) leading to this hospital admission  Discussed that often 70/30 is not optimal choice in the elderly, and suggested switching to basal/bolus plan especially considering his admission for DKA. However, son states patient follows with PCP Dr. Chapman, who manages the insulin. Son does not want any changes made to the insulin plan, other than by the PCP    MEDICATIONS  (STANDING):  aspirin  chewable 81 milliGRAM(s) Oral daily  chlorhexidine 2% Cloths 1 Application(s) Topical daily  clopidogrel Tablet 75 milliGRAM(s) Oral daily  dextrose 40% Gel 15 Gram(s) Oral once  dextrose 5%. 1000 milliLiter(s) (50 mL/Hr) IV Continuous <Continuous>  dextrose 5%. 1000 milliLiter(s) (100 mL/Hr) IV Continuous <Continuous>  dextrose 50% Injectable 25 Gram(s) IV Push once  dextrose 50% Injectable 12.5 Gram(s) IV Push once  dextrose 50% Injectable 25 Gram(s) IV Push once  glucagon  Injectable 1 milliGRAM(s) IntraMuscular once  heparin  Infusion. 900 Unit(s)/Hr (9 mL/Hr) IV Continuous <Continuous>  insulin glargine Injectable (LANTUS) 10 Unit(s) SubCutaneous <User Schedule>  insulin lispro (ADMELOG) corrective regimen sliding scale   SubCutaneous three times a day before meals  insulin lispro (ADMELOG) corrective regimen sliding scale   SubCutaneous at bedtime  insulin lispro Injectable (ADMELOG) 2 Unit(s) SubCutaneous three times a day before meals  isosorbide   mononitrate ER Tablet (IMDUR) 60 milliGRAM(s) Oral daily  metoprolol tartrate 25 milliGRAM(s) Oral three times a day  potassium chloride   Powder 40 milliEquivalent(s) Oral once    MEDICATIONS  (PRN):  heparin   Injectable 4500 Unit(s) IV Push every 6 hours PRN For aPTT less than 40  heparin   Injectable 2000 Unit(s) IV Push every 6 hours PRN For aPTT between 40 - 57      Allergies  dyes,iv dyes (Unknown)  iodinated radiocontrast agents (Hives)    Review of Systems:  UNABLE TO OBTAIN    PHYSICAL EXAM:  VITALS: T(C): 36.5 (06-30-21 @ 05:20)  T(F): 97.7 (06-30-21 @ 05:20), Max: 98.2 (06-29-21 @ 13:30)  HR: 84 (06-30-21 @ 05:20) (67 - 92)  BP: 146/70 (06-30-21 @ 05:20) (142/87 - 159/77)  RR:  (17 - 18)  SpO2:  (100% - 100%)  Wt(kg): --  GENERAL: NAD  EYES: No proptosis, no lid lag, anicteric  HEENT:  Atraumatic, Normocephalic, moist mucous membranes  RESPIRATORY: unlabored respirations     CAPILLARY BLOOD GLUCOSE    POCT Blood Glucose.: 271 mg/dL (30 Jun 2021 12:40)  POCT Blood Glucose.: 242 mg/dL (30 Jun 2021 08:32)  POCT Blood Glucose.: 191 mg/dL (29 Jun 2021 21:41)  POCT Blood Glucose.: 171 mg/dL (29 Jun 2021 17:56)      06-29    150<H>  |  106  |  69<H>  ----------------------------<  255<H>  3.5   |  28  |  1.94<H>    EGFR if : 36<L>  EGFR if non : 31<L>    Ca    9.1      06-29  Mg     2.40     06-29  Phos  2.6     06-29    TPro  6.6  /  Alb  2.9<L>  /  TBili  0.7  /  DBili  x   /  AST  58<H>  /  ALT  92<H>  /  AlkPhos  203<H>  06-29      A1C with Estimated Average Glucose Result: 8.0 % (06-24-21 @ 01:11)    Diet, Pureed:   Consistent Carbohydrate No Snacks (CSTCHO)  DASH/TLC Sodium & Cholesterol Restricted (DASH)  [place] Consistency (NECCON)  Vegan Accepts Vegetable Products Only (06-28-21 @ 19:35)

## 2021-06-30 NOTE — PROGRESS NOTE ADULT - NSICDXPILOT_GEN_ALL_CORE
[place]

## 2021-07-01 ENCOUNTER — TRANSCRIPTION ENCOUNTER (OUTPATIENT)
Age: 86
End: 2021-07-01

## 2022-03-11 NOTE — CONSULT NOTE ADULT - ATTENDING COMMENTS
Return phone call to pt   Pt advised that referral order placed   Advised pt they will contact her to schedule apt   No further questions or concerns at this time     
84 yo M with PMH of CAD s/p remote hx of PCI (unclear anatomy), uncontrolled T2DM, and HLD p/w hypoxia/AMS admitted to MICU for DKA on insulin gtt and likely late presentation MI. Intubated for AMS, and MICU course c/b ARF and shock liver- transferred to [facility] now in rapid a fib. Patient has an NGT and able to answer some questions. TTE: Severe global left ventricular systolic dysfunction.  Patient is currently on heparin gtt for late presentation MI.     Rapid a fib   - late presentation MI   - TTE:  Severe global left ventricular systolic dysfunction.  - start amiodarone 150 mg IV x1   - change beta blocker to lopressor 12.5 mg PO TID  - CTH: No hemorrhage.  - continue heparin gtt
significantly elevated troponin  prolonged QTc  mild ST changes, more suggestive of global ischemia with underlying CAD in the setting of severe metabolic disturbances    contrast allergy in chart    not a candidate for invasive management at this time  opt for medical management, including hep gtt, DAPT  cont to hold bblocker for now
Patient seen and evaluate this morning 6/21.  Critically ill patient with DKA LINDSEY elevated troponins.  The patient has no emergency indications for dialysis as the pH is now alkalotic and K not elevated and patient is now making urine on a bumex infusion.  Recommend maintain fluid balance can D/c bumex if net positive fluid balance is indicated.
84 y/o male with poorly controlled T2DM presented with DKA s/p insulin gtt, resolved, here with hypoxic respiratory failure s/p intubation and extubation with NGT on continuous TF. Endocrine consulted for hypoglycemia on TF while on NPH q6 hours. Patient seen and examined and he has severely protein calorie malnourishment and dry mouth, also with encephalopathy and unable to illicit a history due to his mental status. Based on chart review, agree with assessment and plan as above. Hypoglycemia likely in the setting of not getting TF since patient pulled out his NG tube, will reduce NPH minimally given hyperglycemia now (06/27) and monitor x 24 hours for further reduction in insulin. Discharge plan to be determined based on TF plans. If TF are stopped or held, please hold NPH insulin. If NG tube is pulled out, please page endocrinology at [phone].  Patient also with protein calorie malnourishment and HTN, management as above. F/u to be determined based on patient disposition: rehab & home vs. long term care.    Linnette Iverson DO  Pager: [phone]

## 2023-05-09 NOTE — H&P ADULT - NSRESEARCHGRNTASSESS_GEN_ALL_CORE FT
Speech-Language Pathology Visit  This visit is being performed virtually via Video visit using [employer].   Clinical Location: Home  Joo's location Home and is physically present in   the [facility] at the time of this visit.     Referred by: Sheree Jessica DO; Medical Diagnosis (from order):    Diagnosis Information      Diagnosis    315.39 (ICD-9-CM) - F80.9 (ICD-10-CM) - Speech delay              Visit: 54    Visit Type: Daily Treatment Note  Hearing: impaired and hearing aids    SUBJECTIVE                                                                                                             Present and reporting subjective information: mother and patient  Patient participated relatively well today, especially in conversation when showing her toys. Patient did become tearful after mom/clinician asking her to speak louder several times. Discussed why we were prompting her that way and validated her feelings.  Discussed that mom had an evaluation at [facility] for AAC (mom has not yet received a report); [facility] had seen an authorization when obtaining insurance information. Mom reported the PCP referred but mom did not plan to attend therapy because she did not want to continue with AAC. Clinician discussed positive implications for AAC but validated mom's opinion. Will possibly revisit this in the future. Mom reported she only intends to receive ST at this outpatient facility and school. Mom reported the IEP meeting for school occurred recently and she will be in a mainstream classroom with an  and will continue to receive ST.  Pain / Symptoms:  - patient caregiver reports pain is not an issue/concern      OBJECTIVE                                                                                                                                            Treatment  Expressive Language  Activities: targeted in spontaneous speech to discuss toys, Learning Resources boxes  Goal:  produce 5+ words to sequence events or describe a picture with initial model only in 70% of opportunities to be able to successfully participate in age appropriate conversations= good progress with intermittent models  Hearing Skills/Receptive Language:  Goal: Patient will identify an object from several related descriptors (closed set, familiar vocabulary) with visual and auditory cues in 70% of trials= listening activity, when unknown provided modifiers, continued progress. Audition first used today in repetitive trials; pt needed min-mod encouragement to continue    Hearing Skills- wearing hearing aid for the duration of the session. Modeled listening goals for mom and discussed listening and speech sounds  Articulation:   Goal: produce s/sh in all positions given model, fading as possible, in 8/10 trials= good progress but difficulty at end when focusing on speech intelligibility strategies  Goal: produce /f/ in all positions given model, fading as possible, in 8/10 trials= good progress at short phrase level with repetition    Suprasegmentals: Continued informal targeting and having parent implement as well- emerging with pitch today   - vocal intensity    - rate of speech    - overarticulation    - prosody  Parent Coaching:   Goal: Parent will identify at least one item to work on before next visit and will participate in conversation about the previous goal +  Skilled input: verbal instruction/cues, facilitation and as detailed above  Home Exercise Program: *above indicates provided as part of home exercise program  Review old speech intelligibility strategy handout- sent again. Patient showed some hesitation with this today  Session Summary  - Session focused on: parent support and education, expressive language and receptive language (and hearing skills and articulation)    Interfering components: +increased nasal emission, difficulty with oral sounds but improving.  - SLP offered: strategies, instruction,  opportunity of questions and concerns, increased time, immediate model, repetition and visual cues  - Child demonstrated success with: strategies  - Continue to focus on: receptive language, patient education, expressive language, articulation and communication strategies (+ hearing skills)  Education  - Present: mother  - Education on: modeling speech techniques for guardians/caregivers, SLP recommendations and goals and SLP impressions from session    ASSESSMENT                                                                                                        Components that interfered with complete assessment of above abilities: hearing and medical co morbidities  Patient participated well in today's treatment session with improvements in articulation with max cues but did become visibly frustrated when mom was providing feedback about her rate of speech and volume. Patient demonstrates deficits in articulation, overall speech intelligibility, expressive language, receptive language and hearing skills which negatively impact participation in the home, community and academic environments. Please see above for treatment objectives, strategies trialed, and opportunities to carry-over. Parent verbalized understanding of today's objectives, recommendations, and plan.  Patient continues to require skilled therapy to address above deficits.       Recommendations:  continue current treatment    Education:   - Results of above outlined education: Verbalizes understanding and Demonstrates understanding    PLAN                                                                                                                         Suggestions for next session as indicated: Progress per plan of care,              Therapy procedure time and total treatment time can be found documented on the Time Entry flowsheet.   IMPROVE-DD Assessment completed: Jun 20 2021  9:21PM